# Patient Record
Sex: FEMALE | Race: BLACK OR AFRICAN AMERICAN | NOT HISPANIC OR LATINO | Employment: FULL TIME | URBAN - METROPOLITAN AREA
[De-identification: names, ages, dates, MRNs, and addresses within clinical notes are randomized per-mention and may not be internally consistent; named-entity substitution may affect disease eponyms.]

---

## 2023-09-07 ENCOUNTER — HOSPITAL ENCOUNTER (OUTPATIENT)
Dept: DATA CONVERSION | Facility: HOSPITAL | Age: 33
Discharge: HOME | End: 2023-09-07
Payer: COMMERCIAL

## 2023-09-07 DIAGNOSIS — E11.9 TYPE 2 DIABETES MELLITUS WITHOUT COMPLICATIONS (MULTI): ICD-10-CM

## 2023-09-07 DIAGNOSIS — E78.5 HYPERLIPIDEMIA, UNSPECIFIED: ICD-10-CM

## 2023-09-07 DIAGNOSIS — Z00.00 ENCOUNTER FOR GENERAL ADULT MEDICAL EXAMINATION WITHOUT ABNORMAL FINDINGS: ICD-10-CM

## 2023-09-07 LAB
ALBUMIN SERPL-MCNC: 4.5 GM/DL (ref 3.5–5)
ALBUMIN/GLOB SERPL: 1.6 RATIO (ref 1.5–3)
ALP BLD-CCNC: 44 U/L (ref 35–125)
ALT SERPL-CCNC: 11 U/L (ref 5–40)
ANION GAP SERPL CALCULATED.3IONS-SCNC: 12 MMOL/L (ref 0–19)
APPEARANCE PLAS: CLEAR
AST SERPL-CCNC: 11 U/L (ref 5–40)
BASOPHILS # BLD AUTO: 0.01 K/UL (ref 0–0.22)
BASOPHILS NFR BLD AUTO: 0.2 % (ref 0–1)
BILIRUB SERPL-MCNC: 0.2 MG/DL (ref 0.1–1.2)
BUN SERPL-MCNC: 15 MG/DL (ref 8–25)
BUN/CREAT SERPL: 25 RATIO (ref 8–21)
CALCIUM SERPL-MCNC: 9.5 MG/DL (ref 8.5–10.4)
CHLORIDE SERPL-SCNC: 101 MMOL/L (ref 97–107)
CHOLEST SERPL-MCNC: 204 MG/DL (ref 133–200)
CHOLEST/HDLC SERPL: 4 RATIO
CO2 SERPL-SCNC: 25 MMOL/L (ref 24–31)
COLOR SPUN FLD: YELLOW
CREAT SERPL-MCNC: 0.6 MG/DL (ref 0.4–1.6)
CREAT UR-MCNC: 85.3 MG/DL
DEPRECATED RDW RBC AUTO: 42 FL (ref 37–54)
DIFFERENTIAL METHOD BLD: ABNORMAL
EOSINOPHIL # BLD AUTO: 0.03 K/UL (ref 0–0.45)
EOSINOPHIL NFR BLD: 0.6 % (ref 0–3)
ERYTHROCYTE [DISTWIDTH] IN BLOOD BY AUTOMATED COUNT: 14.3 % (ref 11.7–15)
FASTING STATUS PATIENT QL REPORTED: ABNORMAL
GFR SERPL CREATININE-BSD FRML MDRD: 123 ML/MIN/1.73 M2
GLOBULIN SER-MCNC: 2.9 G/DL (ref 1.9–3.7)
GLUCOSE SERPL-MCNC: 193 MG/DL (ref 65–99)
HBA1C MFR BLD: 6.1 % (ref 4–6)
HCT VFR BLD AUTO: 40.3 % (ref 36–44)
HDLC SERPL-MCNC: 51 MG/DL
HGB BLD-MCNC: 13.4 GM/DL (ref 12–15)
IMM GRANULOCYTES # BLD AUTO: 0.01 K/UL (ref 0–0.1)
LDLC SERPL CALC-MCNC: 138 MG/DL (ref 65–130)
LYMPHOCYTES # BLD AUTO: 1.9 K/UL (ref 1.2–3.2)
LYMPHOCYTES NFR BLD MANUAL: 39.3 % (ref 20–40)
MCH RBC QN AUTO: 26.6 PG (ref 26–34)
MCHC RBC AUTO-ENTMCNC: 33.3 % (ref 31–37)
MCV RBC AUTO: 80.1 FL (ref 80–100)
MICROALBUMIN UR-MCNC: 12 MG/L (ref 0–23)
MICROALBUMIN/CREAT UR: 14.1 MG/G (ref 0–30)
MONOCYTES # BLD AUTO: 0.39 K/UL (ref 0–0.8)
MONOCYTES NFR BLD MANUAL: 8.1 % (ref 0–8)
NEUTROPHILS # BLD AUTO: 2.5 K/UL
NEUTROPHILS # BLD AUTO: 2.5 K/UL (ref 1.8–7.7)
NEUTROPHILS.IMMATURE NFR BLD: 0.2 % (ref 0–1)
NEUTS SEG NFR BLD: 51.6 % (ref 50–70)
NRBC BLD-RTO: 0 /100 WBC
PLATELET # BLD AUTO: 255 K/UL (ref 150–450)
PMV BLD AUTO: 11.4 CU (ref 7–12.6)
POTASSIUM SERPL-SCNC: 4.4 MMOL/L (ref 3.4–5.1)
PROT SERPL-MCNC: 7.4 G/DL (ref 5.9–7.9)
RBC # BLD AUTO: 5.03 M/UL (ref 4–4.9)
SODIUM SERPL-SCNC: 138 MMOL/L (ref 133–145)
TRIGL SERPL-MCNC: 76 MG/DL (ref 40–150)
WBC # BLD AUTO: 4.8 K/UL (ref 4.5–11)

## 2023-09-20 PROBLEM — S39.012A BACK STRAIN: Status: ACTIVE | Noted: 2023-09-20

## 2023-09-20 PROBLEM — M22.2X1 PATELLOFEMORAL PAIN SYNDROME OF BOTH KNEES: Status: ACTIVE | Noted: 2023-09-20

## 2023-09-20 PROBLEM — S69.90XA INJURY OF WRIST: Status: ACTIVE | Noted: 2023-09-20

## 2023-09-20 PROBLEM — R07.9 CHEST PAIN: Status: ACTIVE | Noted: 2023-09-20

## 2023-09-20 PROBLEM — S80.00XA KNEE CONTUSION: Status: ACTIVE | Noted: 2023-09-20

## 2023-09-20 PROBLEM — J45.909 ASTHMA DURING PREGNANCY (HHS-HCC): Status: ACTIVE | Noted: 2023-09-20

## 2023-09-20 PROBLEM — R10.9 ABDOMINAL PAIN IN PREGNANCY (HHS-HCC): Status: ACTIVE | Noted: 2023-09-20

## 2023-09-20 PROBLEM — E55.9 VITAMIN D DEFICIENCY: Status: ACTIVE | Noted: 2023-09-20

## 2023-09-20 PROBLEM — Q74.1 BIPARTITE PATELLA: Status: ACTIVE | Noted: 2023-09-20

## 2023-09-20 PROBLEM — E78.00 ELEVATED CHOLESTEROL: Status: ACTIVE | Noted: 2023-09-20

## 2023-09-20 PROBLEM — R60.0 EDEMA OF EXTREMITIES: Status: ACTIVE | Noted: 2023-09-20

## 2023-09-20 PROBLEM — R23.9 SKIN CHANGE: Status: ACTIVE | Noted: 2023-09-20

## 2023-09-20 PROBLEM — Z83.3 FAMILY HISTORY OF DIABETES MELLITUS IN MOTHER: Status: ACTIVE | Noted: 2023-09-20

## 2023-09-20 PROBLEM — M79.2 NEUROPATHIC PAIN: Status: ACTIVE | Noted: 2023-09-20

## 2023-09-20 PROBLEM — O26.899 ABDOMINAL PAIN IN PREGNANCY (HHS-HCC): Status: ACTIVE | Noted: 2023-09-20

## 2023-09-20 PROBLEM — E11.9 DIABETES MELLITUS (MULTI): Status: ACTIVE | Noted: 2023-09-20

## 2023-09-20 PROBLEM — R21 RASH: Status: ACTIVE | Noted: 2023-09-20

## 2023-09-20 PROBLEM — G57.62 MORTON'S NEUROMA OF LEFT FOOT: Status: ACTIVE | Noted: 2023-09-20

## 2023-09-20 PROBLEM — L50.9 URTICARIA: Status: ACTIVE | Noted: 2023-09-20

## 2023-09-20 PROBLEM — E78.5 DYSLIPIDEMIA: Status: ACTIVE | Noted: 2023-09-20

## 2023-09-20 PROBLEM — R06.00 DYSPNEA: Status: ACTIVE | Noted: 2023-09-20

## 2023-09-20 PROBLEM — M22.2X2 PATELLOFEMORAL PAIN SYNDROME OF BOTH KNEES: Status: ACTIVE | Noted: 2023-09-20

## 2023-09-20 PROBLEM — E11.40 TYPE 2 DIABETES MELLITUS WITH DIABETIC NEUROPATHY, UNSPECIFIED (MULTI): Status: ACTIVE | Noted: 2023-09-20

## 2023-09-20 PROBLEM — O99.519 ASTHMA DURING PREGNANCY (HHS-HCC): Status: ACTIVE | Noted: 2023-09-20
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
## 2023-09-20 PROBLEM — O24.112 PRE-EXISTING TYPE 2 DIABETES MELLITUS DURING PREGNANCY IN SECOND TRIMESTER (HHS-HCC): Status: ACTIVE | Noted: 2023-09-20

## 2023-09-20 PROBLEM — M54.9 BACKACHE: Status: ACTIVE | Noted: 2023-09-20

## 2023-09-20 PROBLEM — E78.2 MIXED HYPERLIPIDEMIA: Status: ACTIVE | Noted: 2023-09-20

## 2023-09-20 PROBLEM — R10.13 EPIGASTRIC PAIN: Status: ACTIVE | Noted: 2023-09-20

## 2023-09-20 PROBLEM — G57.50 TARSAL TUNNEL SYNDROME: Status: ACTIVE | Noted: 2023-09-20

## 2023-09-20 PROBLEM — G51.0 BELL'S PALSY: Status: ACTIVE | Noted: 2023-09-20

## 2023-09-20 PROBLEM — O35.BXX0 ABNORMAL FETAL ECHOCARDIOGRAM AFFECTING ANTEPARTUM CARE OF MOTHER (HHS-HCC): Status: ACTIVE | Noted: 2023-09-20

## 2023-09-20 PROBLEM — J45.909 ASTHMA (HHS-HCC): Status: ACTIVE | Noted: 2023-09-20

## 2023-09-20 PROBLEM — O24.113 TYPE 2 DIABETES MELLITUS AFFECTING PREGNANCY IN THIRD TRIMESTER, ANTEPARTUM (HHS-HCC): Status: ACTIVE | Noted: 2023-09-20

## 2023-09-20 PROBLEM — E11.9 TYPE 2 DIABETES MELLITUS WITHOUT COMPLICATIONS (MULTI): Status: ACTIVE | Noted: 2023-09-20

## 2023-09-20 PROBLEM — E11.65 TYPE 2 DIABETES MELLITUS WITH HYPERGLYCEMIA, WITHOUT LONG-TERM CURRENT USE OF INSULIN (MULTI): Status: ACTIVE | Noted: 2023-09-20

## 2023-09-20 PROBLEM — O99.281: Status: ACTIVE | Noted: 2023-09-20

## 2023-09-20 PROBLEM — H40.9 GLAUCOMA: Status: ACTIVE | Noted: 2023-09-20

## 2023-09-20 PROBLEM — R31.9 HEMATURIA SYNDROME: Status: ACTIVE | Noted: 2023-09-20

## 2023-09-20 PROBLEM — F41.0 ANXIETY ATTACK: Status: ACTIVE | Noted: 2023-09-20

## 2023-09-20 PROBLEM — S96.919A STRAIN OF TENDON OF FOOT AND ANKLE: Status: ACTIVE | Noted: 2023-09-20

## 2023-09-20 RX ORDER — DOCUSATE SODIUM 100 MG/1
100 CAPSULE, LIQUID FILLED ORAL 2 TIMES DAILY
COMMUNITY
End: 2023-12-22 | Stop reason: ALTCHOICE

## 2023-09-20 RX ORDER — FLASH GLUCOSE SENSOR
KIT MISCELLANEOUS
COMMUNITY
Start: 2023-07-08 | End: 2023-12-22 | Stop reason: SDUPTHER

## 2023-09-20 RX ORDER — TRIAMCINOLONE ACETONIDE 1 MG/G
CREAM TOPICAL
COMMUNITY
Start: 2023-02-03 | End: 2024-01-23 | Stop reason: ALTCHOICE

## 2023-09-20 RX ORDER — TIMOLOL MALEATE 5 MG/1
TABLET ORAL
COMMUNITY
End: 2024-01-23 | Stop reason: ALTCHOICE

## 2023-09-20 RX ORDER — TIRZEPATIDE 2.5 MG/.5ML
INJECTION, SOLUTION SUBCUTANEOUS
COMMUNITY
Start: 2023-02-01 | End: 2023-12-22 | Stop reason: ALTCHOICE

## 2023-09-20 RX ORDER — SEMAGLUTIDE 1.34 MG/ML
INJECTION, SOLUTION SUBCUTANEOUS
COMMUNITY
Start: 2022-12-30 | End: 2023-12-22 | Stop reason: ALTCHOICE

## 2023-09-20 RX ORDER — ACETAMINOPHEN 325 MG/1
TABLET ORAL
COMMUNITY
Start: 2023-01-17 | End: 2024-03-28 | Stop reason: ALTCHOICE

## 2023-09-20 RX ORDER — BLOOD-GLUCOSE METER
EACH MISCELLANEOUS
COMMUNITY
Start: 2018-05-27

## 2023-09-20 RX ORDER — ASPIRIN 81 MG/1
1 TABLET ORAL DAILY
COMMUNITY
Start: 2020-07-20 | End: 2024-01-23 | Stop reason: ALTCHOICE

## 2023-09-20 RX ORDER — INSULIN DETEMIR 100 [IU]/ML
INJECTION, SOLUTION SUBCUTANEOUS
COMMUNITY
Start: 2020-07-20 | End: 2023-12-22 | Stop reason: ALTCHOICE

## 2023-09-20 RX ORDER — IBUPROFEN 800 MG/1
800 TABLET ORAL EVERY 8 HOURS PRN
COMMUNITY
End: 2024-03-28 | Stop reason: ALTCHOICE

## 2023-09-20 RX ORDER — LATANOPROST 50 UG/ML
SOLUTION/ DROPS OPHTHALMIC
COMMUNITY
End: 2023-12-22 | Stop reason: ALTCHOICE

## 2023-09-20 RX ORDER — BRIMONIDINE TARTRATE 2 MG/ML
SOLUTION/ DROPS OPHTHALMIC
COMMUNITY
Start: 2023-08-18 | End: 2023-12-22 | Stop reason: ALTCHOICE

## 2023-09-20 RX ORDER — ATORVASTATIN CALCIUM 10 MG/1
1 TABLET, FILM COATED ORAL DAILY
COMMUNITY
End: 2024-01-23 | Stop reason: ALTCHOICE

## 2023-09-20 RX ORDER — INSULIN LISPRO 100 [IU]/ML
INJECTION, SOLUTION INTRAVENOUS; SUBCUTANEOUS
COMMUNITY
Start: 2020-07-20 | End: 2023-12-22 | Stop reason: ALTCHOICE

## 2023-09-20 RX ORDER — BUDESONIDE AND FORMOTEROL FUMARATE DIHYDRATE 160; 4.5 UG/1; UG/1
AEROSOL RESPIRATORY (INHALATION)
COMMUNITY
Start: 2018-01-23 | End: 2023-12-22 | Stop reason: ALTCHOICE

## 2023-09-20 RX ORDER — ERGOCALCIFEROL 1.25 MG/1
1.25 CAPSULE ORAL
COMMUNITY
Start: 2022-04-20

## 2023-09-20 RX ORDER — TIMOLOL MALEATE 5 MG/ML
1 SOLUTION/ DROPS OPHTHALMIC 2 TIMES DAILY
COMMUNITY
Start: 2023-08-21 | End: 2023-12-22 | Stop reason: ALTCHOICE

## 2023-10-11 ENCOUNTER — PHARMACY VISIT (OUTPATIENT)
Dept: PHARMACY | Facility: CLINIC | Age: 33
End: 2023-10-11
Payer: MEDICAID

## 2023-10-11 DIAGNOSIS — E11.9 TYPE 2 DIABETES MELLITUS WITHOUT COMPLICATION, WITHOUT LONG-TERM CURRENT USE OF INSULIN (MULTI): Primary | ICD-10-CM

## 2023-10-11 PROCEDURE — RXMED WILLOW AMBULATORY MEDICATION CHARGE

## 2023-10-11 RX ORDER — TIRZEPATIDE 2.5 MG/.5ML
INJECTION, SOLUTION SUBCUTANEOUS
Qty: 2 ML | Refills: 5 | Status: SHIPPED | OUTPATIENT
Start: 2023-10-11 | End: 2023-12-22 | Stop reason: WASHOUT

## 2023-11-10 ENCOUNTER — HOSPITAL ENCOUNTER (OUTPATIENT)
Dept: RADIOLOGY | Facility: CLINIC | Age: 33
Discharge: HOME | End: 2023-11-10
Payer: COMMERCIAL

## 2023-11-10 ENCOUNTER — OFFICE VISIT (OUTPATIENT)
Dept: ORTHOPEDIC SURGERY | Facility: CLINIC | Age: 33
End: 2023-11-10
Payer: COMMERCIAL

## 2023-11-10 DIAGNOSIS — S63.612A SPRAIN OF RIGHT MIDDLE FINGER, INITIAL ENCOUNTER: ICD-10-CM

## 2023-11-10 DIAGNOSIS — G57.62 MORTON'S NEUROMA OF LEFT FOOT: ICD-10-CM

## 2023-11-10 DIAGNOSIS — M79.644 PAIN OF RIGHT MIDDLE FINGER: ICD-10-CM

## 2023-11-10 DIAGNOSIS — M79.644 PAIN OF RIGHT MIDDLE FINGER: Primary | ICD-10-CM

## 2023-11-10 DIAGNOSIS — M79.672 LEFT FOOT PAIN: ICD-10-CM

## 2023-11-10 PROCEDURE — 73140 X-RAY EXAM OF FINGER(S): CPT | Mod: RT,FY

## 2023-11-10 PROCEDURE — 99213 OFFICE O/P EST LOW 20 MIN: CPT | Performed by: ORTHOPAEDIC SURGERY

## 2023-11-10 PROCEDURE — 1036F TOBACCO NON-USER: CPT | Performed by: ORTHOPAEDIC SURGERY

## 2023-11-10 PROCEDURE — 3078F DIAST BP <80 MM HG: CPT | Performed by: ORTHOPAEDIC SURGERY

## 2023-11-10 PROCEDURE — 3044F HG A1C LEVEL LT 7.0%: CPT | Performed by: ORTHOPAEDIC SURGERY

## 2023-11-10 PROCEDURE — 3074F SYST BP LT 130 MM HG: CPT | Performed by: ORTHOPAEDIC SURGERY

## 2023-11-10 ASSESSMENT — PAIN - FUNCTIONAL ASSESSMENT: PAIN_FUNCTIONAL_ASSESSMENT: 0-10

## 2023-11-10 ASSESSMENT — PATIENT HEALTH QUESTIONNAIRE - PHQ9
SUM OF ALL RESPONSES TO PHQ9 QUESTIONS 1 AND 2: 0
2. FEELING DOWN, DEPRESSED OR HOPELESS: NOT AT ALL
1. LITTLE INTEREST OR PLEASURE IN DOING THINGS: NOT AT ALL

## 2023-11-10 ASSESSMENT — PAIN SCALES - GENERAL
PAINLEVEL: 9
PAINLEVEL_OUTOF10: 7

## 2023-11-10 NOTE — PROGRESS NOTES
Subjective      No chief complaint on file.       No surgery found     [unfilled]   This 33-year-old patient presents today with right  third finger finger pain.  The patient states that they feel persistent pain at the third finger.  The patient denies any previous injury. The patient states that the finger pain is debilitating and impairs their ability to do thier normal activities of daily living.  They have tried ibuprofen and Tylenol with no relief of their finger pain.  In addition, I previously evaluated this patient for Ruelas's neuroma of the left foot and treated her with instruction regarding modification of shoewear and also with a cortisone injection.  While the cortisone injection did give good relief of her left foot pain did cause some discoloration of her skin.  The left foot pain between the second and third meta soles has recurred.  She presents today and request discussion of further treatment options.    CARDIOLOGY:   Negative for chest pain, shortness of breath.   RESPIRATORY:   Negative for chest pain, shortness of breath.   MUSCULOSKELETAL:   See HPI for details.   NEUROLOGY:   Negative for tingling, numbness, weakness.    Objective    There were no vitals taken for this visit.     Physical Exam  itutional: Appears stated age. No apparent distress  Labored Breathing: No  Psychiatric: Normal mood and effect.   Neurological: alert and oriented x3  Skin: intact  MUSCULOSKELETAL: Neck: No tenderness. No pain or limitation with range of motion. Back: No tenderness. Straight leg test negative bilaterally. right hand: There is no pain catching and locking at the third finger.  There is no pain catching or locking any of the other fingers of the right hand.  There is tenderness over the PIP joint. Phalen's is negative.  Finkelstein's is negative.  Neurovascular is intact to light touch.  Left foot: There is tenderness between the left second and third metatarsals.  There is some discoloration of  the skin of the left foot but this is improved from the previous office visit.    AP and lateral x-rays of the right hand done and read in office today show no evidence of acute fracture or bone destruction.     Diagnoses and all orders for this visit:  Pain of right middle finger (Primary)  -     XR fingers right 2+ views; Future  Sprain of right middle finger, initial encounter  Options are discussed with the patient in detail.  The patient is instructed regarding activity modification and risk for further injury with falling or trauma, ice, physician directed at home gentle strengthening and ROM exercises, and the appropriate use of Tylenol as needed for pain with its potential adverse reactions and side effects. The patient understands. Please note that this report has been produced using speech recognition software.  It may contain errors related to grammar, punctuation or spelling.  Electronically signed, but not reviewed.    Porter Sweet MD

## 2023-11-14 ENCOUNTER — PHARMACY VISIT (OUTPATIENT)
Dept: PHARMACY | Facility: CLINIC | Age: 33
End: 2023-11-14
Payer: MEDICAID

## 2023-11-14 PROCEDURE — RXMED WILLOW AMBULATORY MEDICATION CHARGE

## 2023-11-28 ENCOUNTER — TELEPHONE (OUTPATIENT)
Dept: OBSTETRICS AND GYNECOLOGY | Facility: CLINIC | Age: 33
End: 2023-11-28
Payer: COMMERCIAL

## 2023-11-28 NOTE — TELEPHONE ENCOUNTER
Patient's daughter has been sick and she forgot to start letrozole on day 3 of her cycle. Today is day 5 and she wants to know if she should start it or how to proceed

## 2023-12-14 ENCOUNTER — TELEPHONE (OUTPATIENT)
Dept: PRIMARY CARE | Facility: CLINIC | Age: 33
End: 2023-12-14
Payer: COMMERCIAL

## 2023-12-14 DIAGNOSIS — E11.9 TYPE 2 DIABETES MELLITUS WITHOUT COMPLICATION, WITHOUT LONG-TERM CURRENT USE OF INSULIN (MULTI): ICD-10-CM

## 2023-12-14 PROCEDURE — RXMED WILLOW AMBULATORY MEDICATION CHARGE

## 2023-12-14 RX ORDER — LANCETS 33 GAUGE
EACH MISCELLANEOUS
Qty: 200 EACH | Refills: 2 | Status: SHIPPED | OUTPATIENT
Start: 2023-12-14

## 2023-12-14 RX ORDER — DEXTROSE 4 G
TABLET,CHEWABLE ORAL
Qty: 1 EACH | Refills: 0 | Status: SHIPPED | OUTPATIENT
Start: 2023-12-14

## 2023-12-14 RX ORDER — BLOOD-GLUCOSE CONTROL, NORMAL
1 EACH MISCELLANEOUS 2 TIMES DAILY
Qty: 200 EACH | Refills: 2 | Status: SHIPPED | OUTPATIENT
Start: 2023-12-14 | End: 2024-07-14

## 2023-12-14 NOTE — TELEPHONE ENCOUNTER
Pt currently has Ricky, but would like a glucose monitoring kit with strips and lancets sent to her pharmacy. She doesn't feel the Ricky is reliable enough and would like to be able to check her levels. Send to  pharmacy.

## 2023-12-18 ENCOUNTER — PHARMACY VISIT (OUTPATIENT)
Dept: PHARMACY | Facility: CLINIC | Age: 33
End: 2023-12-18
Payer: MEDICAID

## 2023-12-18 PROCEDURE — RXMED WILLOW AMBULATORY MEDICATION CHARGE

## 2023-12-22 ENCOUNTER — OFFICE VISIT (OUTPATIENT)
Dept: PRIMARY CARE | Facility: CLINIC | Age: 33
End: 2023-12-22
Payer: COMMERCIAL

## 2023-12-22 VITALS
HEIGHT: 59 IN | SYSTOLIC BLOOD PRESSURE: 114 MMHG | TEMPERATURE: 98 F | WEIGHT: 128 LBS | HEART RATE: 105 BPM | DIASTOLIC BLOOD PRESSURE: 70 MMHG | OXYGEN SATURATION: 98 % | BODY MASS INDEX: 25.8 KG/M2

## 2023-12-22 DIAGNOSIS — E11.9 DIABETES MELLITUS WITHOUT COMPLICATION (MULTI): Primary | ICD-10-CM

## 2023-12-22 DIAGNOSIS — E11.40 TYPE 2 DIABETES MELLITUS WITH DIABETIC NEUROPATHY, WITHOUT LONG-TERM CURRENT USE OF INSULIN (MULTI): ICD-10-CM

## 2023-12-22 LAB — POC HEMOGLOBIN A1C: 6.8 % (ref 4.2–6.5)

## 2023-12-22 PROCEDURE — 1036F TOBACCO NON-USER: CPT | Performed by: INTERNAL MEDICINE

## 2023-12-22 PROCEDURE — 3074F SYST BP LT 130 MM HG: CPT | Performed by: INTERNAL MEDICINE

## 2023-12-22 PROCEDURE — 3044F HG A1C LEVEL LT 7.0%: CPT | Performed by: INTERNAL MEDICINE

## 2023-12-22 PROCEDURE — 99213 OFFICE O/P EST LOW 20 MIN: CPT | Performed by: INTERNAL MEDICINE

## 2023-12-22 PROCEDURE — 3078F DIAST BP <80 MM HG: CPT | Performed by: INTERNAL MEDICINE

## 2023-12-22 PROCEDURE — RXMED WILLOW AMBULATORY MEDICATION CHARGE

## 2023-12-22 RX ORDER — FLASH GLUCOSE SENSOR
KIT MISCELLANEOUS
Qty: 2 EACH | Refills: 5 | Status: SHIPPED | OUTPATIENT
Start: 2023-12-22 | End: 2024-03-28 | Stop reason: ALTCHOICE

## 2023-12-22 RX ORDER — INSULIN DETEMIR 100 [IU]/ML
15 INJECTION, SOLUTION SUBCUTANEOUS NIGHTLY
Qty: 15 ML | Refills: 5 | Status: SHIPPED | OUTPATIENT
Start: 2023-12-22 | End: 2024-03-04 | Stop reason: SDUPTHER

## 2023-12-22 ASSESSMENT — PATIENT HEALTH QUESTIONNAIRE - PHQ9
SUM OF ALL RESPONSES TO PHQ9 QUESTIONS 1 AND 2: 0
1. LITTLE INTEREST OR PLEASURE IN DOING THINGS: NOT AT ALL
2. FEELING DOWN, DEPRESSED OR HOPELESS: NOT AT ALL

## 2023-12-22 ASSESSMENT — ENCOUNTER SYMPTOMS
BLOOD IN STOOL: 0
BACK PAIN: 0
OCCASIONAL FEELINGS OF UNSTEADINESS: 0
SEIZURES: 0
WHEEZING: 0
POLYPHAGIA: 0
VOMITING: 0
NAUSEA: 0
CHILLS: 0
PALPITATIONS: 0
UNEXPECTED WEIGHT CHANGE: 0
ABDOMINAL PAIN: 0
TREMORS: 0
COUGH: 0
FREQUENCY: 0
TROUBLE SWALLOWING: 0
DYSURIA: 0
NUMBNESS: 0
DEPRESSION: 0
SHORTNESS OF BREATH: 0
POLYDIPSIA: 0
LOSS OF SENSATION IN FEET: 0
MYALGIAS: 0
SINUS PRESSURE: 0
FATIGUE: 0

## 2023-12-22 ASSESSMENT — PAIN SCALES - GENERAL: PAINLEVEL: 0-NO PAIN

## 2023-12-22 ASSESSMENT — COLUMBIA-SUICIDE SEVERITY RATING SCALE - C-SSRS
2. HAVE YOU ACTUALLY HAD ANY THOUGHTS OF KILLING YOURSELF?: NO
1. IN THE PAST MONTH, HAVE YOU WISHED YOU WERE DEAD OR WISHED YOU COULD GO TO SLEEP AND NOT WAKE UP?: NO
6. HAVE YOU EVER DONE ANYTHING, STARTED TO DO ANYTHING, OR PREPARED TO DO ANYTHING TO END YOUR LIFE?: NO

## 2023-12-22 NOTE — PROGRESS NOTES
"Subjective   Patient ID: Sarah Gallagher is a 33 y.o. female who presents for Follow-up (Pt states sugar readings have been high).    HPI     H/O diabetes mellitus- blood sugars usually range from .  Stated since last 2 weeks her blood sugars are in the 250s.  Denied any hypoglycemic episodes. Since Ozempic was on back order, started Mounjaro in 2022. Initially she felt mild nausea for 1st few days, however denied any symptoms now.   She is trying for pregnancy, had one of her fallopian tubes opened up few months ago.  Recommended to start Levemir.  As Mounjaro is contraindicated during pregnancy           Stated she was diagnosed with bilateral inguinal hernia-denied any swelling or pain.      Review of Systems   Constitutional:  Negative for chills, fatigue and unexpected weight change.   HENT:  Negative for postnasal drip, sinus pressure and trouble swallowing.    Respiratory:  Negative for cough, shortness of breath and wheezing.    Cardiovascular:  Negative for chest pain, palpitations and leg swelling.   Gastrointestinal:  Negative for abdominal pain, blood in stool, nausea and vomiting.   Endocrine: Negative for polydipsia, polyphagia and polyuria.   Genitourinary:  Negative for dysuria and frequency.   Musculoskeletal:  Negative for back pain and myalgias.   Skin:  Negative for rash.   Neurological:  Negative for tremors, seizures and numbness.   Psychiatric/Behavioral:  Negative for behavioral problems.        Objective   /70 (BP Location: Left arm, Patient Position: Sitting, BP Cuff Size: Small adult)   Pulse 105   Temp 36.7 °C (98 °F) (Temporal)   Ht 1.499 m (4' 11\")   Wt 58.1 kg (128 lb)   SpO2 98%   BMI 25.85 kg/m²     Physical Exam  Constitutional:       General: She is not in acute distress.     Appearance: Normal appearance.   HENT:      Head: Normocephalic and atraumatic.   Eyes:      Extraocular Movements: Extraocular movements intact.      Pupils: Pupils are equal, round, and " reactive to light.   Cardiovascular:      Rate and Rhythm: Normal rate and regular rhythm.      Heart sounds: Normal heart sounds. No murmur heard.     No friction rub.   Pulmonary:      Effort: Pulmonary effort is normal.      Breath sounds: Normal breath sounds. No wheezing or rales.   Abdominal:      General: Bowel sounds are normal.      Palpations: Abdomen is soft.      Tenderness: There is no abdominal tenderness. There is no guarding.   Musculoskeletal:      Right lower leg: No edema.      Left lower leg: No edema.   Skin:     General: Skin is warm and dry.      Findings: No rash.   Neurological:      General: No focal deficit present.      Mental Status: She is alert and oriented to person, place, and time.      Cranial Nerves: No cranial nerve deficit.   Psychiatric:         Mood and Affect: Mood normal.         Assessment/Plan       Sarah was seen today for follow-up.  Diagnoses and all orders for this visit:  Diabetes mellitus without complication (CMS/[facility]) (Primary)  -     FreeStyle Ricky 14 Day Sensor kit; As directed  -     POCT glycosylated hemoglobin (Hb A1C) manually resulted  -     insulin detemir (Levemir FlexPen) 100 unit/mL (3 mL) pen; Inject 15 Units under the skin once daily at bedtime.  Type 2 diabetes mellitus with diabetic neuropathy, without long-term current use of insulin (CMS/[facility])     Stopped Mounjaro, also advised to stop atorvastatin    Lab Results   Component Value Date    HGBA1C 6.8 (A) 12/22/2023        Current Outpatient Medications   Medication Instructions    acetaminophen (Tylenol) 325 mg tablet     albuterol 108 (90 Base) MCG/ACT inhaler 2 puffs, inhalation, Every 6 hours PRN    aspirin 81 mg EC tablet 1 tablet, oral, Daily    atorvastatin (Lipitor) 10 mg tablet 1 tablet, oral, Daily    blood sugar diagnostic (OneTouch Verio test strips) strip      blood sugar diagnostic strip USE EVERY 12 HOURS AS DIRECTED    blood-glucose meter (Accu-Chek Guide Glucose Meter) misc Use as  "directed to check blood sugar    brimonidine (AlphaGAN) 0.2 % ophthalmic solution USE 1 DROP IN BOTH EYES TWICE DAILY.    docusate sodium (Colace) 100 mg capsule TAKE 1 CAPSULE BY MOUTH TWO TIMES A DAY    ergocalciferol (Vitamin D-2) 1.25 MG (11085 UT) capsule 1 capsule, oral, Weekly    FreeStyle Ricky 14 Day Sensor kit As directed    ibuprofen 800 mg, oral, Every 8 hours PRN    lancets (OneTouch Delica Plus Lancet) 30 gauge misc 1 strip, miscellaneous, 2 times daily    lancets 33 gauge misc Use as directed    letrozole (Femara) 2.5 mg tablet TAKE 2 TABLETS BY MOUTH ONCE A DAY FROM DAY 3 OF THE PERIOD FOR 5 DAYS TO DAY 7 OF THE PERIOD. REPEAT FOR 3 MONTHS IF NOT PREGNANT    Levemir FlexPen 15 Units, subcutaneous, Nightly    pen needle, diabetic 32 gauge x 5/32\" needle Use every day with Levemir    timolol (Blocadren) 5 mg tablet      timolol (Timoptic) 0.5 % ophthalmic solution USE 1 DROP IN BOTH EYES TWICE DAILY.    triamcinolone (Kenalog) 0.1 % cream          "

## 2023-12-23 ENCOUNTER — PHARMACY VISIT (OUTPATIENT)
Dept: PHARMACY | Facility: CLINIC | Age: 33
End: 2023-12-23
Payer: MEDICAID

## 2024-01-15 ENCOUNTER — TELEMEDICINE (OUTPATIENT)
Dept: PRIMARY CARE | Facility: CLINIC | Age: 34
End: 2024-01-15
Payer: COMMERCIAL

## 2024-01-15 DIAGNOSIS — E11.9 DIABETES MELLITUS WITHOUT COMPLICATION (MULTI): ICD-10-CM

## 2024-01-15 PROCEDURE — 99442 PR PHYS/QHP TELEPHONE EVALUATION 11-20 MIN: CPT | Performed by: INTERNAL MEDICINE

## 2024-01-15 RX ORDER — TIRZEPATIDE 5 MG/.5ML
5 INJECTION, SOLUTION SUBCUTANEOUS
Qty: 2 ML | Refills: 2 | Status: SHIPPED | OUTPATIENT
Start: 2024-01-15 | End: 2024-02-05 | Stop reason: SDUPTHER

## 2024-01-15 ASSESSMENT — ENCOUNTER SYMPTOMS
OCCASIONAL FEELINGS OF UNSTEADINESS: 0
DEPRESSION: 0
LOSS OF SENSATION IN FEET: 0

## 2024-01-15 ASSESSMENT — PATIENT HEALTH QUESTIONNAIRE - PHQ9
1. LITTLE INTEREST OR PLEASURE IN DOING THINGS: NOT AT ALL
SUM OF ALL RESPONSES TO PHQ9 QUESTIONS 1 AND 2: 0
2. FEELING DOWN, DEPRESSED OR HOPELESS: NOT AT ALL

## 2024-01-15 NOTE — PROGRESS NOTES
Subjective   Patient ID: Sarah Gallagher is a 33 y.o. female who presents for Discuss elavated sugar levels.    HPI     H/O diabetes mellitus-complaining of increased blood sugar readings since last week.  Stated blood sugars are ranging from 200-3 50.   Started her on Levemir few weeks ago, as she is trying to get pregnant.    Due to increased blood sugars, she took Mounjaro today morning.    Stated she has been trying to get pregnant for past several years, wants to continue with Mounjaro at this time.             Stated she was diagnosed with bilateral inguinal hernia-denied any swelling or pain.    Review of Systems    See HPI    Objective   There were no vitals taken for this visit.    Physical Exam    Virtual visit    Assessment/Plan        Sarha was seen today for discuss elavated sugar levels.  Diagnoses and all orders for this visit:  Diabetes mellitus without complication (CMS/HCC)  -     tirzepatide (Mounjaro) 5 mg/0.5 mL pen injector; Inject 5 mg under the skin 1 (one) time per week.  -     Discontinue: insulin lispro (HumaLOG) 100 unit/mL injection; Inject 10 Units under the skin 3 times a day with meals. Take 2U for 150-199 blood sugars, 4U for 200-249, 6U for 250-299, 8U for 300-349, and 10 U for > 350 blood sugar level  -     insulin lispro (HumaLOG) 100 unit/mL injection; Inject 10 Units under the skin 3 times a day with meals. Take 2U for 150-199 blood sugars, 4U for 200-249, 6U for 250-299, 8U for 300-349, and 10 U for > 350 blood sugar level     Increased Mounjaro to 5 mg after 4 weeks of 2.5 mg  Stop Levemir, advised to start Levemir when she gets pregnant    Current Outpatient Medications   Medication Instructions    acetaminophen (Tylenol) 325 mg tablet     albuterol 108 (90 Base) MCG/ACT inhaler 2 puffs, inhalation, Every 6 hours PRN    aspirin 81 mg EC tablet 1 tablet, oral, Daily    atorvastatin (Lipitor) 10 mg tablet 1 tablet, oral, Daily    blood sugar diagnostic (OneTouch Verio test  "strips) strip      blood sugar diagnostic strip USE EVERY 12 HOURS AS DIRECTED    blood-glucose meter (Accu-Chek Guide Glucose Meter) misc Use as directed to check blood sugar    brimonidine (AlphaGAN) 0.2 % ophthalmic solution USE 1 DROP IN BOTH EYES TWICE DAILY.    docusate sodium (Colace) 100 mg capsule TAKE 1 CAPSULE BY MOUTH TWO TIMES A DAY    ergocalciferol (Vitamin D-2) 1.25 MG (99339 UT) capsule 1 capsule, oral, Weekly    FreeStyle Ricky 14 Day Sensor kit As directed    ibuprofen 800 mg, oral, Every 8 hours PRN    insulin lispro (HUMALOG) 10 Units, subcutaneous, 3 times daily with meals, Take 2U for 150-199 blood sugars, 4U for 200-249, 6U for 250-299, 8U for 300-349, and 10 U for > 350 blood sugar level    lancets (OneTouch Delica Plus Lancet) 30 gauge misc 1 strip, miscellaneous, 2 times daily    lancets 33 gauge misc Use as directed    letrozole (Femara) 2.5 mg tablet TAKE 2 TABLETS BY MOUTH ONCE A DAY FROM DAY 3 OF THE PERIOD FOR 5 DAYS TO DAY 7 OF THE PERIOD. REPEAT FOR 3 MONTHS IF NOT PREGNANT    Levemir FlexPen 15 Units, subcutaneous, Nightly    Mounjaro 5 mg, subcutaneous, Weekly    pen needle, diabetic 32 gauge x 5/32\" needle Use every day with Levemir    timolol (Blocadren) 5 mg tablet      timolol (Timoptic) 0.5 % ophthalmic solution USE 1 DROP IN BOTH EYES TWICE DAILY.    triamcinolone (Kenalog) 0.1 % cream       "

## 2024-01-16 ENCOUNTER — TELEPHONE (OUTPATIENT)
Dept: PRIMARY CARE | Facility: CLINIC | Age: 34
End: 2024-01-16
Payer: COMMERCIAL

## 2024-01-16 PROCEDURE — RXMED WILLOW AMBULATORY MEDICATION CHARGE

## 2024-01-16 RX ORDER — INSULIN LISPRO 100 [IU]/ML
10 INJECTION, SOLUTION INTRAVENOUS; SUBCUTANEOUS
Qty: 15 ML | Refills: 2 | Status: SHIPPED | OUTPATIENT
Start: 2024-01-16 | End: 2024-01-16 | Stop reason: SDUPTHER

## 2024-01-16 RX ORDER — INSULIN LISPRO 100 [IU]/ML
10 INJECTION, SOLUTION INTRAVENOUS; SUBCUTANEOUS
Qty: 15 ML | Refills: 2 | Status: SHIPPED | OUTPATIENT
Start: 2024-01-16 | End: 2024-03-28 | Stop reason: ALTCHOICE

## 2024-01-16 NOTE — TELEPHONE ENCOUNTER
Pt called states during virtual call yesterday, short acting insulin was supposed to be sent over to pharmacy due to her waking up with elevated sugar levels. Pt states her bloods sugar was high again this morning at 371, patient rechecked it 4hrs later and it 291. Please send it to [facility]

## 2024-01-22 ENCOUNTER — PHARMACY VISIT (OUTPATIENT)
Dept: PHARMACY | Facility: CLINIC | Age: 34
End: 2024-01-22
Payer: MEDICAID

## 2024-01-22 ENCOUNTER — TELEPHONE (OUTPATIENT)
Dept: PRIMARY CARE | Facility: CLINIC | Age: 34
End: 2024-01-22

## 2024-01-23 ENCOUNTER — OFFICE VISIT (OUTPATIENT)
Dept: PRIMARY CARE | Facility: CLINIC | Age: 34
End: 2024-01-23
Payer: COMMERCIAL

## 2024-01-23 VITALS
DIASTOLIC BLOOD PRESSURE: 72 MMHG | HEART RATE: 87 BPM | BODY MASS INDEX: 26.26 KG/M2 | OXYGEN SATURATION: 99 % | SYSTOLIC BLOOD PRESSURE: 104 MMHG | WEIGHT: 130 LBS | TEMPERATURE: 98.8 F

## 2024-01-23 DIAGNOSIS — E11.9 TYPE 2 DIABETES MELLITUS WITHOUT COMPLICATION, WITHOUT LONG-TERM CURRENT USE OF INSULIN (MULTI): Primary | ICD-10-CM

## 2024-01-23 DIAGNOSIS — E11.65 TYPE 2 DIABETES MELLITUS WITH HYPERGLYCEMIA, WITHOUT LONG-TERM CURRENT USE OF INSULIN (MULTI): ICD-10-CM

## 2024-01-23 PROCEDURE — 99213 OFFICE O/P EST LOW 20 MIN: CPT | Performed by: INTERNAL MEDICINE

## 2024-01-23 PROCEDURE — 3074F SYST BP LT 130 MM HG: CPT | Performed by: INTERNAL MEDICINE

## 2024-01-23 PROCEDURE — 1036F TOBACCO NON-USER: CPT | Performed by: INTERNAL MEDICINE

## 2024-01-23 PROCEDURE — 3078F DIAST BP <80 MM HG: CPT | Performed by: INTERNAL MEDICINE

## 2024-01-23 ASSESSMENT — PAIN SCALES - GENERAL: PAINLEVEL: 0-NO PAIN

## 2024-01-23 ASSESSMENT — ENCOUNTER SYMPTOMS
LOSS OF SENSATION IN FEET: 0
DEPRESSION: 0
OCCASIONAL FEELINGS OF UNSTEADINESS: 0

## 2024-01-23 ASSESSMENT — PATIENT HEALTH QUESTIONNAIRE - PHQ9
1. LITTLE INTEREST OR PLEASURE IN DOING THINGS: NOT AT ALL
2. FEELING DOWN, DEPRESSED OR HOPELESS: NOT AT ALL
SUM OF ALL RESPONSES TO PHQ9 QUESTIONS 1 AND 2: 0

## 2024-01-23 ASSESSMENT — LIFESTYLE VARIABLES: HOW MANY STANDARD DRINKS CONTAINING ALCOHOL DO YOU HAVE ON A TYPICAL DAY: PATIENT DOES NOT DRINK

## 2024-01-23 NOTE — PROGRESS NOTES
Subjective   Patient ID: Sarah Gallagher is a 33 y.o. female who presents for Diabetes (Dm follow up).    HPI   History of diabetes mellitus-she is here today as she is concerned about increased blood sugars in the last few days.  She started Mounjaro 2.5 mg, was unable to increase it to 5 mg as she is waiting for prior authorization from a pharmacy.  She prefers to take Ozempic, stated her blood sugars were better controlled on Ozempic.  Stopped Levemir, since last few days her blood sugars are running in 200's to 300's.  She does not want to take Humalog 3 times a day.  Denied any blurry vision, increased urinary frequency    Review of Systems   Constitutional:  Negative for chills, fatigue and unexpected weight change.   HENT:  Negative for postnasal drip, sinus pressure and trouble swallowing.    Respiratory:  Negative for cough, shortness of breath and wheezing.    Cardiovascular:  Negative for chest pain, palpitations and leg swelling.   Gastrointestinal:  Negative for abdominal pain, blood in stool, nausea and vomiting.   Endocrine: Negative for polydipsia, polyphagia and polyuria.   Genitourinary:  Negative for dysuria and frequency.   Musculoskeletal:  Negative for back pain and myalgias.   Skin:  Negative for rash.   Neurological:  Negative for tremors, seizures and numbness.   Psychiatric/Behavioral:  Negative for behavioral problems.        Objective   /72 (BP Location: Right arm, Patient Position: Sitting, BP Cuff Size: Adult)   Pulse 87   Temp 37.1 °C (98.8 °F) (Temporal)   Wt 59 kg (130 lb)   SpO2 99%   BMI 26.26 kg/m²     Physical Exam  Constitutional:       General: She is not in acute distress.  HENT:      Head: Normocephalic and atraumatic.   Cardiovascular:      Rate and Rhythm: Normal rate and regular rhythm.   Pulmonary:      Effort: Pulmonary effort is normal. No respiratory distress.      Breath sounds: Normal breath sounds.   Musculoskeletal:      Right lower leg: No edema.       Left lower leg: No edema.   Neurological:      Mental Status: She is alert.         Assessment/Plan       Sarah was seen today for diabetes.  Diagnoses and all orders for this visit:  Type 2 diabetes mellitus without complication, without long-term current use of insulin (CMS/[facility]) (Primary)  -     semaglutide 0.25 mg or 0.5 mg (2 mg/3 mL) pen injector; Inject 0.5 mg under the skin 1 (one) time per week.  Type 2 diabetes mellitus with hyperglycemia, without long-term current use of insulin (CMS/[facility])     Sent Ozempic to pharmacy, advised to stop Mounjaro if Ozempic is is covered  Start Levemir 12 units at night, take Humalog as needed  Advised to increase Levemir 2 units every 3 to 4 days to achieve fasting blood sugar range of

## 2024-01-25 ENCOUNTER — TELEPHONE (OUTPATIENT)
Dept: OBSTETRICS AND GYNECOLOGY | Facility: CLINIC | Age: 34
End: 2024-01-25
Payer: COMMERCIAL

## 2024-01-25 PROBLEM — O24.113 TYPE 2 DIABETES MELLITUS AFFECTING PREGNANCY IN THIRD TRIMESTER, ANTEPARTUM (HHS-HCC): Status: RESOLVED | Noted: 2023-09-20 | Resolved: 2024-01-25

## 2024-01-25 ASSESSMENT — ENCOUNTER SYMPTOMS
SINUS PRESSURE: 0
DYSURIA: 0
VOMITING: 0
PALPITATIONS: 0
NAUSEA: 0
SEIZURES: 0
TREMORS: 0
UNEXPECTED WEIGHT CHANGE: 0
FATIGUE: 0
BACK PAIN: 0
WHEEZING: 0
NUMBNESS: 0
TROUBLE SWALLOWING: 0
SHORTNESS OF BREATH: 0
COUGH: 0
POLYDIPSIA: 0
MYALGIAS: 0
BLOOD IN STOOL: 0
ABDOMINAL PAIN: 0
POLYPHAGIA: 0
CHILLS: 0
FREQUENCY: 0

## 2024-01-25 NOTE — TELEPHONE ENCOUNTER
Pt. Called stating she needs higher dose of Letrozole due to she thinks her period is about to start again.

## 2024-02-02 ENCOUNTER — OFFICE VISIT (OUTPATIENT)
Dept: OBSTETRICS AND GYNECOLOGY | Facility: CLINIC | Age: 34
End: 2024-02-02
Payer: COMMERCIAL

## 2024-02-02 VITALS — WEIGHT: 148.3 LBS | BODY MASS INDEX: 29.95 KG/M2

## 2024-02-02 DIAGNOSIS — N97.9 INFERTILITY, FEMALE: Primary | ICD-10-CM

## 2024-02-02 PROCEDURE — 99213 OFFICE O/P EST LOW 20 MIN: CPT | Performed by: OBSTETRICS & GYNECOLOGY

## 2024-02-02 PROCEDURE — 1036F TOBACCO NON-USER: CPT | Performed by: OBSTETRICS & GYNECOLOGY

## 2024-02-02 ASSESSMENT — ENCOUNTER SYMPTOMS
OCCASIONAL FEELINGS OF UNSTEADINESS: 0
LOSS OF SENSATION IN FEET: 0
DEPRESSION: 0

## 2024-02-02 ASSESSMENT — PAIN SCALES - GENERAL: PAINLEVEL: 0-NO PAIN

## 2024-02-02 ASSESSMENT — PATIENT HEALTH QUESTIONNAIRE - PHQ9
2. FEELING DOWN, DEPRESSED OR HOPELESS: NOT AT ALL
SUM OF ALL RESPONSES TO PHQ9 QUESTIONS 1 AND 2: 0
1. LITTLE INTEREST OR PLEASURE IN DOING THINGS: NOT AT ALL

## 2024-02-02 NOTE — PROGRESS NOTES
Sarah Gallagher is a 33 y.o. female, No obstetric history on file. who presented with  pelvic pain, sec infertility       History of endometriosis surgically diagnosed   Was on Letrozole for 3 cycles with no effect   Pain controlled   No  Dysparunia   Regular periods       Obstetrical History:  OB History    No obstetric history on file.           Vaginal discharge    No  Menopausal symptoms No        Last Pap:  History of abnormal pap exams? Yes      Past Medical History:   Diagnosis Date    Personal history of other diseases of the nervous system and sense organs 09/03/2020    History of glaucoma    Personal history of other endocrine, nutritional and metabolic disease     History of diabetes mellitus    Personal history of other mental and behavioral disorders     History of depression    Personal history of other specified conditions     History of shortness of breath    Personal history of other specified conditions     History of urinary frequency    Type 2 diabetes mellitus affecting pregnancy in third trimester, antepartum 09/20/2023     Past Surgical History:   Procedure Laterality Date    KNEE Left     knee scope     Family History   Problem Relation Name Age of Onset    Lung cancer Other grand mother      Social History     Tobacco Use    Smoking status: Never    Smokeless tobacco: Never   Vaping Use    Vaping Use: Never used   Substance Use Topics    Alcohol use: Never    Drug use: Not Currently     Social History     Tobacco Use   Smoking Status Never   Smokeless Tobacco Never       Current Outpatient Medications on File Prior to Visit   Medication Sig Dispense Refill    acetaminophen (Tylenol) 325 mg tablet       albuterol 108 (90 Base) MCG/ACT inhaler Inhale 2 puffs every 6 hours if needed.      blood sugar diagnostic (OneTouch Verio test strips) strip        blood sugar diagnostic strip USE EVERY 12 HOURS AS DIRECTED 200 each 2    blood-glucose meter (Accu-Chek Guide Glucose Meter) misc Use as  "directed to check blood sugar 1 each 0    brimonidine (AlphaGAN) 0.2 % ophthalmic solution USE 1 DROP IN BOTH EYES TWICE DAILY. 10 mL 5    docusate sodium (Colace) 100 mg capsule TAKE 1 CAPSULE BY MOUTH TWO TIMES A DAY 30 capsule 1    ergocalciferol (Vitamin D-2) 1.25 MG (07382 UT) capsule Take 1 capsule (1,250 mcg) by mouth 1 (one) time per week.      FreeStyle Ricky 14 Day Sensor kit As directed 2 each 5    ibuprofen 800 mg tablet Take 1 tablet (800 mg) by mouth every 8 hours if needed.      insulin detemir (Levemir FlexPen) 100 unit/mL (3 mL) pen Inject 15 Units under the skin once daily at bedtime. 15 mL 5    insulin lispro (HumaLOG) 100 unit/mL injection Inject 10 Units under the skin 3 times a day with meals. Take 2U for 150-199 blood sugars, 4U for 200-249, 6U for 250-299, 8U for 300-349, and 10 U for > 350 blood sugar level 15 mL 2    lancets (OneTouch Delica Plus Lancet) 30 gauge misc 1 strip 2 times a day. 200 each 2    lancets 33 gauge misc Use as directed 200 each 2    pen needle, diabetic 32 gauge x 5/32\" needle Use every day with Levemir 100 each 3    semaglutide 0.25 mg or 0.5 mg (2 mg/3 mL) pen injector Inject 0.5 mg under the skin 1 (one) time per week. 3 mL 1    timolol (Timoptic) 0.5 % ophthalmic solution USE 1 DROP IN BOTH EYES TWICE DAILY. 10 mL 5    tirzepatide (Mounjaro) 5 mg/0.5 mL pen injector Inject 5 mg under the skin 1 (one) time per week. (Patient taking differently: Inject 2.5 mg under the skin 1 (one) time per week.) 2 mL 2     No current facility-administered medications on file prior to visit.     Allergies   Allergen Reactions    Morphine Nausea And Vomiting, GI Upset, Nausea Only and Nausea/vomiting         REVIEW OF SYSTEMS:    General: No weight loss, malaise or fevers or other constitutional symptoms.  Neuro: No history of TIA's, stroke,.  No neurological symptoms or problems. No visual changes  Respiratory: No history of respiratory/pulmonary symptoms or " problems.  Cardiovascular: No history of cardiovascular symptoms or problems.  GI: No history of GI symptoms or problems.   : No history of UTI in past 6 weeks.  No history of  symptoms or problems.  BREASTS: No skin dimpling, nipple discharge or masses.  Endocrine: No history of diabetes. No Thyroid symptoms  Hematology: No history of bleeding or clotting disorder.  Skin: Negative for lesions, rash, and itching.      PHYSICAL EXAMINATION:    Wt 67.3 kg (148 lb 4.8 oz)   LMP  (LMP Unknown)   BMI 29.95 kg/m²   GENERAL: pleasant, female in no apparent distress  HEENT: Normocephalic, atraumatic, mucus membranes moist and no lesions  NEURO: alert and oriented x3,exam grossly non-focal  NECK: Supple, full range of motion, no adenopathy and thyroid normal  DERMATOLOGY: Normal, without lesions, non-icteric and non-hirsute  BREAST: soft, non-tender, symmetric, no dominant mass, normal nipple-areolar complex, no lymphadenopathy and no nipple discharge  CHEST: Normal inspiratory effort    ABDOMEN: soft, non-tender and no masses  PELVIC: uterus normal size, shape and consistency, no adnexal masses and non-tender        ASSESSMENT and Plan:    Sarah Gallagher is a 33 y.o. female, No obstetric history on file. who  presented with sec infertility and endometriosis       Plan is  - I  ordered HSG to check tubes  - Patner has other kids and wont do Semen analysis   - Will increase letrozole dose if HSG is normal      - FU after US             Gracia Vann MD

## 2024-02-05 DIAGNOSIS — E11.9 DIABETES MELLITUS WITHOUT COMPLICATION (MULTI): ICD-10-CM

## 2024-02-05 DIAGNOSIS — E11.9 TYPE 2 DIABETES MELLITUS WITHOUT COMPLICATION, WITHOUT LONG-TERM CURRENT USE OF INSULIN (MULTI): ICD-10-CM

## 2024-02-05 RX ORDER — TIRZEPATIDE 2.5 MG/.5ML
INJECTION, SOLUTION SUBCUTANEOUS
Qty: 2 ML | Refills: 5 | Status: CANCELLED | OUTPATIENT
Start: 2024-02-05 | End: 2025-02-04

## 2024-02-05 RX ORDER — TIRZEPATIDE 5 MG/.5ML
5 INJECTION, SOLUTION SUBCUTANEOUS
Qty: 2 ML | Refills: 2 | Status: SHIPPED | OUTPATIENT
Start: 2024-02-05 | End: 2024-02-06 | Stop reason: ALTCHOICE

## 2024-02-06 ENCOUNTER — TELEPHONE (OUTPATIENT)
Dept: PRIMARY CARE | Facility: CLINIC | Age: 34
End: 2024-02-06
Payer: COMMERCIAL

## 2024-02-06 DIAGNOSIS — E11.9 TYPE 2 DIABETES MELLITUS WITHOUT COMPLICATION, WITHOUT LONG-TERM CURRENT USE OF INSULIN (MULTI): Primary | ICD-10-CM

## 2024-02-06 RX ORDER — DULAGLUTIDE 0.75 MG/.5ML
0.75 INJECTION, SOLUTION SUBCUTANEOUS
Qty: 2 ML | Refills: 0 | Status: SHIPPED | OUTPATIENT
Start: 2024-02-06 | End: 2024-04-01

## 2024-02-06 NOTE — TELEPHONE ENCOUNTER
"Pt hasn't medication ( mounjaro ) in 2 days, was told by her insurance company that we did the prior auth wrong. I passed on the info to the personnel what was said in order to get it hopefully approved. Pt then called back saying that in order for her to get mounjaro we would need to do a peer to peer, if not pt is willing to take Trulicity in the highest strength even though she felt it didn't help her before but she needs to have some type of medication \"she is shaking\". Please advise  "

## 2024-02-11 ENCOUNTER — APPOINTMENT (OUTPATIENT)
Dept: RADIOLOGY | Facility: HOSPITAL | Age: 34
End: 2024-02-11
Payer: COMMERCIAL

## 2024-02-11 ENCOUNTER — HOSPITAL ENCOUNTER (EMERGENCY)
Facility: HOSPITAL | Age: 34
Discharge: HOME | End: 2024-02-11
Payer: COMMERCIAL

## 2024-02-11 VITALS
SYSTOLIC BLOOD PRESSURE: 109 MMHG | HEART RATE: 80 BPM | BODY MASS INDEX: 26.87 KG/M2 | DIASTOLIC BLOOD PRESSURE: 67 MMHG | OXYGEN SATURATION: 100 % | RESPIRATION RATE: 16 BRPM | TEMPERATURE: 97.9 F | WEIGHT: 128 LBS | HEIGHT: 58 IN

## 2024-02-11 DIAGNOSIS — S63.501A SPRAIN OF RIGHT WRIST, INITIAL ENCOUNTER: Primary | ICD-10-CM

## 2024-02-11 PROCEDURE — 73110 X-RAY EXAM OF WRIST: CPT | Mod: RT

## 2024-02-11 PROCEDURE — 99283 EMERGENCY DEPT VISIT LOW MDM: CPT

## 2024-02-11 ASSESSMENT — PAIN DESCRIPTION - FREQUENCY: FREQUENCY: CONSTANT/CONTINUOUS

## 2024-02-11 ASSESSMENT — PAIN DESCRIPTION - PAIN TYPE: TYPE: ACUTE PAIN

## 2024-02-11 ASSESSMENT — PAIN DESCRIPTION - ORIENTATION
ORIENTATION: RIGHT
ORIENTATION: RIGHT

## 2024-02-11 ASSESSMENT — PAIN - FUNCTIONAL ASSESSMENT
PAIN_FUNCTIONAL_ASSESSMENT: 0-10
PAIN_FUNCTIONAL_ASSESSMENT: 0-10

## 2024-02-11 ASSESSMENT — PAIN DESCRIPTION - LOCATION
LOCATION: WRIST
LOCATION: WRIST

## 2024-02-11 ASSESSMENT — LIFESTYLE VARIABLES
HAVE PEOPLE ANNOYED YOU BY CRITICIZING YOUR DRINKING: NO
EVER HAD A DRINK FIRST THING IN THE MORNING TO STEADY YOUR NERVES TO GET RID OF A HANGOVER: NO
EVER FELT BAD OR GUILTY ABOUT YOUR DRINKING: NO
HAVE YOU EVER FELT YOU SHOULD CUT DOWN ON YOUR DRINKING: NO

## 2024-02-11 ASSESSMENT — COLUMBIA-SUICIDE SEVERITY RATING SCALE - C-SSRS
6. HAVE YOU EVER DONE ANYTHING, STARTED TO DO ANYTHING, OR PREPARED TO DO ANYTHING TO END YOUR LIFE?: NO
1. IN THE PAST MONTH, HAVE YOU WISHED YOU WERE DEAD OR WISHED YOU COULD GO TO SLEEP AND NOT WAKE UP?: NO
2. HAVE YOU ACTUALLY HAD ANY THOUGHTS OF KILLING YOURSELF?: NO

## 2024-02-11 ASSESSMENT — PAIN DESCRIPTION - PROGRESSION: CLINICAL_PROGRESSION: NOT CHANGED

## 2024-02-11 ASSESSMENT — PAIN DESCRIPTION - ONSET: ONSET: ONGOING

## 2024-02-11 ASSESSMENT — PAIN SCALES - GENERAL
PAINLEVEL_OUTOF10: 9
PAINLEVEL_OUTOF10: 9

## 2024-02-11 ASSESSMENT — PAIN DESCRIPTION - DESCRIPTORS: DESCRIPTORS: ACHING

## 2024-02-11 NOTE — ED PROVIDER NOTES
HPI   Chief Complaint   Patient presents with    Wrist Injury     Patient states yesterday she was picking up a cast iron griddle and her right wrist bent backwards and hurting today.       HPI                    [place] Coma Scale Score: 15                     Patient History   Past Medical History:   Diagnosis Date    Personal history of other diseases of the nervous system and sense organs 09/03/2020    History of glaucoma    Personal history of other endocrine, nutritional and metabolic disease     History of diabetes mellitus    Personal history of other mental and behavioral disorders     History of depression    Personal history of other specified conditions     History of shortness of breath    Personal history of other specified conditions     History of urinary frequency    Type 2 diabetes mellitus affecting pregnancy in third trimester, antepartum 09/20/2023     Past Surgical History:   Procedure Laterality Date    KNEE Left     knee scope     Family History   Problem Relation Name Age of Onset    Lung cancer Other grand mother      Social History     Tobacco Use    Smoking status: Never    Smokeless tobacco: Never   Vaping Use    Vaping Use: Never used   Substance Use Topics    Alcohol use: Never    Drug use: Never       Physical Exam   ED Triage Vitals [02/11/24 1336]   Temperature Heart Rate Respirations BP   36.6 °C (97.9 °F) 82 16 133/84      Pulse Ox Temp Source Heart Rate Source Patient Position   100 % Oral Monitor Sitting      BP Location FiO2 (%)     Left arm --       Physical Exam  CONSTITUTIONAL: Vital signs reviewed as charted, well-developed and in no distress  Eyes: Extraocular muscles are intact. Pupils equal round and reactive to light. Conjunctiva are pink.    ENT: Mucous membranes are moist. Tongue in the midline. Pharynx was without erythema or exudates, uvula midline  LUNGS: Breath sounds equal and clear to auscultation. Good air exchange, no wheezes rales or retractions, pulse  oximetry is charted.  HEART: Regular rate and rhythm without murmur thrill or rub, strong tones, auscultation is normal.  ABDOMEN: Soft and nontender without guarding rebound rigidity or mass. Bowel sounds are present and normal in all quadrants. There is no palpable masses or aneurysms identified. No hepatosplenomegaly, normal abdominal exam.  Neuro: The patient is awake, alert and oriented ×3. Moving all 4 extremities and answering questions appropriately.   MUSCULOSKELETAL examination of the right upper extremity shows no tenderness at the proximal radius or ulna.  Does have some mild tenderness at the distal radius and ulna.  Worsening pain with extension.  No ecchymosis edema or obvious deformity motor sensation pulses are intact distally.  PSYCH: Awake alert oriented, normal mood and affect.  Skin:  Dry, normal color, warm to the touch, no rash present.      ED Course & MDM   Diagnoses as of 02/11/24 1423   Sprain of right wrist, initial encounter       Medical Decision Making  History obtained from: patient    Vital signs, nursing notes, current medications, past medical history, Surgical history, allergies, social history, family History were reviewed.         HPI:  Patient 33-year-old female presenting emergency room today complaining of a right wrist injury.  States yesterday she was lifting something up when she felt a pop.  She states today it is painful every time she tries to lift something or grab something.  She denies dizziness, chest pain, shortness of breath, abdominal pain extremity edema.  She is nontoxic and well-appearing vitals within normal limits.      10 point ROS was reviewed and negative except Noted above in HPI.  DDX: as listed above          MDM Summary/considerations:  EMERGENCY DEPARTMENT COURSE and DIFFERENTIAL DIAGNOSIS/MDM:        The patient presented with a chief complaint of right wrist injury. The differential diagnosis associated with this patient's presentation includes  "fracture, dislocation, subluxation, sprain.       Vitals:    Vitals:    02/11/24 1336   BP: 133/84   BP Location: Left arm   Patient Position: Sitting   Pulse: 82   Resp: 16   Temp: 36.6 °C (97.9 °F)   TempSrc: Oral   SpO2: 100%   Weight: 58.1 kg (128 lb)   Height: 1.473 m (4' 10\")       Diagnoses as of 02/11/24 1425   Sprain of right wrist, initial encounter       History Limited by:    None    Independent history obtained from:    None      External records reviewed:    None      Diagnostics interpreted by me:    Xray(s) of the right wrist      Discussions with other clinicians:    None      Chronic conditions impacting care:    None      Social determinants of health affecting care:    None    Diagnostic tests considered but not performed: none    ED Medications managed:    Medications - No data to display      Prescription drugs considered:    None    Labs Reviewed - No data to display  XR wrist right 3+ views   Final Result   Normal right wrist radiographs.        Signed by: Dillon Medina 2/11/2024 2:12 PM   Dictation workstation:   [id number]        Medications - No data to display  New Prescriptions    No medications on file       I estimate there is LOW risk for COMPARTMENT SYNDROME, DEEP VENOUS THROMBOSIS, SEPTIC ARTHRITIS, TENDON OR NEUROVASCULAR INJURY, thus I consider the discharge disposition reasonable. We have discussed the diagnosis and risks, and we agree with discharging home to follow-up with their primary doctor or the referral orthopedist. We also discussed returning to the Emergency Department immediately if new or worsening symptoms occur. We have discussed the symptoms which aremost concerning (e.g., changing or worsening pain, numbness, weakness) that necessitates immediate return.    X-rays were negative for acute fracture.  Clinically consistent with sprain.  Symptomatic treatment include acetaminophen and ibuprofen ice were discussed with the patient.  Was placed into a Velcro wrist " splint.  Will follow with her PCP 1 to 2 days for reevaluation.  Was discharged home in stable condition all of the patient's questions were answered to the best of my ability.  Patient states understanding that they have been screened for an emergency today and we have not found any etiology of symptoms that requires emergent treatment or admission to the hospital at this point. They understand that they have not had definitive care day and require follow-up for treatment of their condition. They also state understanding that they may have an emergent condition that may potentially have not of detected at this visit and they must return to the emergency department if they develop any worsening of symptoms or new complaints.          Critical Care: Not warranted at this time        This chart was completed using voice recognition transcription software. Please excuse any errors of transcription including grammatical, punctuation, syntax and spelling errors.  Please contact me with any questions regarding this chart.    Procedure  Procedures     YOUSIF Domingo-WILMA  02/11/24 3020

## 2024-02-12 DIAGNOSIS — E11.65 TYPE 2 DIABETES MELLITUS WITH HYPERGLYCEMIA, WITHOUT LONG-TERM CURRENT USE OF INSULIN (MULTI): Primary | ICD-10-CM

## 2024-02-12 PROCEDURE — RXMED WILLOW AMBULATORY MEDICATION CHARGE

## 2024-02-15 PROCEDURE — RXMED WILLOW AMBULATORY MEDICATION CHARGE

## 2024-02-16 PROCEDURE — RXMED WILLOW AMBULATORY MEDICATION CHARGE

## 2024-02-17 ENCOUNTER — PHARMACY VISIT (OUTPATIENT)
Dept: PHARMACY | Facility: CLINIC | Age: 34
End: 2024-02-17
Payer: MEDICAID

## 2024-03-04 ENCOUNTER — OFFICE VISIT (OUTPATIENT)
Dept: PRIMARY CARE | Facility: CLINIC | Age: 34
End: 2024-03-04
Payer: COMMERCIAL

## 2024-03-04 ENCOUNTER — HOSPITAL ENCOUNTER (EMERGENCY)
Facility: HOSPITAL | Age: 34
Discharge: HOME | End: 2024-03-04
Attending: STUDENT IN AN ORGANIZED HEALTH CARE EDUCATION/TRAINING PROGRAM
Payer: COMMERCIAL

## 2024-03-04 VITALS
HEART RATE: 74 BPM | TEMPERATURE: 97.9 F | RESPIRATION RATE: 16 BRPM | OXYGEN SATURATION: 98 % | DIASTOLIC BLOOD PRESSURE: 94 MMHG | HEIGHT: 58 IN | WEIGHT: 134 LBS | BODY MASS INDEX: 28.13 KG/M2 | SYSTOLIC BLOOD PRESSURE: 141 MMHG

## 2024-03-04 VITALS
TEMPERATURE: 98 F | DIASTOLIC BLOOD PRESSURE: 68 MMHG | OXYGEN SATURATION: 99 % | BODY MASS INDEX: 28.13 KG/M2 | WEIGHT: 134 LBS | HEART RATE: 100 BPM | HEIGHT: 58 IN | SYSTOLIC BLOOD PRESSURE: 122 MMHG

## 2024-03-04 DIAGNOSIS — R20.0 RIGHT FACIAL NUMBNESS: Primary | ICD-10-CM

## 2024-03-04 DIAGNOSIS — E11.9 DIABETES MELLITUS WITHOUT COMPLICATION (MULTI): ICD-10-CM

## 2024-03-04 DIAGNOSIS — R20.2 FACIAL PARESTHESIA: Primary | ICD-10-CM

## 2024-03-04 DIAGNOSIS — R20.0 NUMBNESS OF TONGUE: ICD-10-CM

## 2024-03-04 LAB
ALBUMIN SERPL-MCNC: 4.6 G/DL (ref 3.5–5)
ALP BLD-CCNC: 41 U/L (ref 35–125)
ALT SERPL-CCNC: 9 U/L (ref 5–40)
ANION GAP SERPL CALC-SCNC: 14 MMOL/L
AST SERPL-CCNC: 12 U/L (ref 5–40)
BILIRUB SERPL-MCNC: 0.2 MG/DL (ref 0.1–1.2)
BUN SERPL-MCNC: 14 MG/DL (ref 8–25)
CALCIUM SERPL-MCNC: 9.5 MG/DL (ref 8.5–10.4)
CHLORIDE SERPL-SCNC: 100 MMOL/L (ref 97–107)
CO2 SERPL-SCNC: 23 MMOL/L (ref 24–31)
CREAT SERPL-MCNC: 0.7 MG/DL (ref 0.4–1.6)
EGFRCR SERPLBLD CKD-EPI 2021: >90 ML/MIN/1.73M*2
ERYTHROCYTE [DISTWIDTH] IN BLOOD BY AUTOMATED COUNT: 14.7 % (ref 11.5–14.5)
GLUCOSE SERPL-MCNC: 123 MG/DL (ref 65–99)
HCG SERPL-ACNC: <1 MIU/ML
HCT VFR BLD AUTO: 38.8 % (ref 36–46)
HGB BLD-MCNC: 12.8 G/DL (ref 12–16)
MAGNESIUM SERPL-MCNC: 1.8 MG/DL (ref 1.6–3.1)
MCH RBC QN AUTO: 26.3 PG (ref 26–34)
MCHC RBC AUTO-ENTMCNC: 33 G/DL (ref 32–36)
MCV RBC AUTO: 80 FL (ref 80–100)
NRBC BLD-RTO: 0 /100 WBCS (ref 0–0)
PLATELET # BLD AUTO: 304 X10*3/UL (ref 150–450)
POTASSIUM SERPL-SCNC: 3.8 MMOL/L (ref 3.4–5.1)
PROT SERPL-MCNC: 7.5 G/DL (ref 5.9–7.9)
RBC # BLD AUTO: 4.86 X10*6/UL (ref 4–5.2)
SODIUM SERPL-SCNC: 137 MMOL/L (ref 133–145)
TSH SERPL DL<=0.05 MIU/L-ACNC: 1.5 MIU/L (ref 0.27–4.2)
WBC # BLD AUTO: 6.9 X10*3/UL (ref 4.4–11.3)

## 2024-03-04 PROCEDURE — 84702 CHORIONIC GONADOTROPIN TEST: CPT | Performed by: STUDENT IN AN ORGANIZED HEALTH CARE EDUCATION/TRAINING PROGRAM

## 2024-03-04 PROCEDURE — 80053 COMPREHEN METABOLIC PANEL: CPT | Performed by: STUDENT IN AN ORGANIZED HEALTH CARE EDUCATION/TRAINING PROGRAM

## 2024-03-04 PROCEDURE — 85027 COMPLETE CBC AUTOMATED: CPT | Performed by: STUDENT IN AN ORGANIZED HEALTH CARE EDUCATION/TRAINING PROGRAM

## 2024-03-04 PROCEDURE — 36415 COLL VENOUS BLD VENIPUNCTURE: CPT | Performed by: STUDENT IN AN ORGANIZED HEALTH CARE EDUCATION/TRAINING PROGRAM

## 2024-03-04 PROCEDURE — 83735 ASSAY OF MAGNESIUM: CPT | Performed by: STUDENT IN AN ORGANIZED HEALTH CARE EDUCATION/TRAINING PROGRAM

## 2024-03-04 PROCEDURE — 99214 OFFICE O/P EST MOD 30 MIN: CPT | Performed by: INTERNAL MEDICINE

## 2024-03-04 PROCEDURE — 99283 EMERGENCY DEPT VISIT LOW MDM: CPT

## 2024-03-04 PROCEDURE — 1036F TOBACCO NON-USER: CPT | Performed by: INTERNAL MEDICINE

## 2024-03-04 PROCEDURE — 84443 ASSAY THYROID STIM HORMONE: CPT | Performed by: STUDENT IN AN ORGANIZED HEALTH CARE EDUCATION/TRAINING PROGRAM

## 2024-03-04 PROCEDURE — 3078F DIAST BP <80 MM HG: CPT | Performed by: INTERNAL MEDICINE

## 2024-03-04 PROCEDURE — 3074F SYST BP LT 130 MM HG: CPT | Performed by: INTERNAL MEDICINE

## 2024-03-04 ASSESSMENT — ENCOUNTER SYMPTOMS
LOSS OF SENSATION IN FEET: 0
DEPRESSION: 0
OCCASIONAL FEELINGS OF UNSTEADINESS: 0

## 2024-03-04 ASSESSMENT — COLUMBIA-SUICIDE SEVERITY RATING SCALE - C-SSRS
2. HAVE YOU ACTUALLY HAD ANY THOUGHTS OF KILLING YOURSELF?: NO
2. HAVE YOU ACTUALLY HAD ANY THOUGHTS OF KILLING YOURSELF?: NO
1. IN THE PAST MONTH, HAVE YOU WISHED YOU WERE DEAD OR WISHED YOU COULD GO TO SLEEP AND NOT WAKE UP?: NO
1. IN THE PAST MONTH, HAVE YOU WISHED YOU WERE DEAD OR WISHED YOU COULD GO TO SLEEP AND NOT WAKE UP?: NO
6. HAVE YOU EVER DONE ANYTHING, STARTED TO DO ANYTHING, OR PREPARED TO DO ANYTHING TO END YOUR LIFE?: NO
6. HAVE YOU EVER DONE ANYTHING, STARTED TO DO ANYTHING, OR PREPARED TO DO ANYTHING TO END YOUR LIFE?: NO

## 2024-03-04 ASSESSMENT — PAIN DESCRIPTION - ORIENTATION: ORIENTATION: RIGHT

## 2024-03-04 ASSESSMENT — PAIN SCALES - GENERAL
PAINLEVEL_OUTOF10: 8
PAINLEVEL: 8

## 2024-03-04 ASSESSMENT — PAIN DESCRIPTION - LOCATION: LOCATION: FACE

## 2024-03-04 ASSESSMENT — PAIN DESCRIPTION - PAIN TYPE: TYPE: ACUTE PAIN

## 2024-03-04 ASSESSMENT — PAIN - FUNCTIONAL ASSESSMENT: PAIN_FUNCTIONAL_ASSESSMENT: 0-10

## 2024-03-04 NOTE — ED TRIAGE NOTES
Sent from doctor's office for right facial pain (pressure/tightness)- right jaw, chin, ear pain. Also having tongue tingling and shaking. This has been going on for about 4 days. Hx of glaucoma and T2DM on insulin

## 2024-03-04 NOTE — PROGRESS NOTES
I saw this patient very briefly as the Physician in Triage for rapid assessment, to establish acuity and develop basic plan of care. A more thorough history and physical exam will be documented by treating physician and/or JB in the emergency department.     History: 33-year-old female presenting for 4 days of right-sided facial tingling and tingling over the right tip of the tongue.  Denies any aphasia, dysarthria.  Denies any focal weakness, numbness or tingling.  She has no history of TMJ previously diagnosed by ENT but has not followed up with them.  She does endorse mild jaw pain.  She denies any headache, neck pain, neck stiffness or fevers chills or sweats.  Denies any sore throat, runny nose or ear pressure or change in hearing.  No falls or head injury.  Does have history of glaucoma reportedly and type 2 diabetes insulin-dependent.  Previously seen by her family practitioner earlier today.     Exam: ENT exam is benign.  Patient reports normal sensation over all 3 distributions of the trigeminal nerve.  Cranial nerves II through XII intact.  Strength 5 out of 5 in all 4 extremities.  Sensation intact to light touch in all 4 extremities.  No dysdiadochokinesia, no dysmetria.  Gait is narrow and stable.  NIH 0.  GCS 15.  Cardiopulmonary exam benign with clear lungs and heart regular rate and rhythm.  Patient mildly anxious appearing.     Plan: Basic laboratory studies to be obtained.  Given patient symptomatology been ongoing for the last 4 days I do not believe that brain attack activation is appropriate.  There is no sign of neurologic decompensation or deficit.  Does not appear to have any herpetic lesions or sign of Bell's palsy.  Will defer need for intracranial imaging to the main ED physician.           Roger Sutton DO  03/04/24 8977

## 2024-03-04 NOTE — ED PROVIDER NOTES
I saw this patient very briefly as the Physician in Triage for rapid assessment, to establish acuity and develop basic plan of care. A more thorough history and physical exam will be documented by treating physician and/or JB in the emergency department.    History: 33-year-old female presenting for 4 days of right-sided facial tingling and tingling over the right tip of the tongue.  Denies any aphasia, dysarthria.  Denies any focal weakness, numbness or tingling.  She has no history of TMJ previously diagnosed by ENT but has not followed up with them.  She does endorse mild jaw pain.  She denies any headache, neck pain, neck stiffness or fevers chills or sweats.  Denies any sore throat, runny nose or ear pressure or change in hearing.  No falls or head injury.  Does have history of glaucoma reportedly and type 2 diabetes insulin-dependent.  Previously seen by her family practitioner earlier today.    Exam: ENT exam is benign.  Patient reports normal sensation over all 3 distributions of the trigeminal nerve.  Cranial nerves II through XII intact.  Strength 5 out of 5 in all 4 extremities.  Sensation intact to light touch in all 4 extremities.  No dysdiadochokinesia, no dysmetria.  Gait is narrow and stable.  NIH 0.  GCS 15.  Cardiopulmonary exam benign with clear lungs and heart regular rate and rhythm.  Patient mildly anxious appearing.    Plan: Basic laboratory studies to be obtained.  Given patient symptomatology been ongoing for the last 4 days I do not believe that brain attack activation is appropriate.  There is no sign of neurologic decompensation or deficit.  Does not appear to have any herpetic lesions or sign of Bell's palsy.  Will defer need for intracranial imaging to the main ED physician.         Roger Sutton,   03/04/24 1631       Roger Sutton, DO  03/04/24 1737

## 2024-03-04 NOTE — DISCHARGE INSTRUCTIONS
Seek immediate medical attention if you develop: severe headache, nausea, vomiting, confusion, weakness, loss of motion in your arms or legs, loss of control of your urine or stool, difficulty waking from sleep, neck pain, fever, or any new or worsening symptoms.

## 2024-03-04 NOTE — PROGRESS NOTES
"Subjective   Patient ID: Sarah Gallagher is a 33 y.o. female who presents for Hypertension (Shakes and tongue has been tingling for 4 days /Pt has not taken trulicity).    HPI     Patient is here concerned about her blood pressure readings stated she checked her blood pressure few hours ago and it was 135/70.  Complaining of right-sided jaw pain, was diagnosed with TMJ few months ago.  Has an appointment with ENT next few days  Complaining of tingling of the tip of the tongue since last 4 days.  Hands are feeling shaky however blood sugars are within normal range.  She is worried that she is going to have a stroke, discussed with patient that her symptoms do not suggest stroke   Stated she is stressed since last 4 days  Denied any weakness    Review of Systems   Constitutional:  Negative for chills, fatigue and unexpected weight change.   HENT:  Negative for postnasal drip, sinus pressure and trouble swallowing.         Right jaw pain   Respiratory:  Negative for cough, shortness of breath and wheezing.    Cardiovascular:  Negative for chest pain, palpitations and leg swelling.   Gastrointestinal:  Negative for abdominal pain, blood in stool, nausea and vomiting.   Endocrine: Negative for polydipsia, polyphagia and polyuria.   Genitourinary:  Negative for dysuria and frequency.   Musculoskeletal:  Positive for arthralgias. Negative for back pain and myalgias.   Skin:  Negative for rash.   Neurological:  Negative for tremors, seizures and numbness.   Psychiatric/Behavioral:  Negative for behavioral problems. The patient is nervous/anxious.        Objective   /68 (BP Location: Left arm, Patient Position: Sitting, BP Cuff Size: Small adult)   Pulse 100   Temp 36.7 °C (98 °F) (Temporal)   Ht 1.473 m (4' 10\")   Wt 60.8 kg (134 lb)   SpO2 99%   BMI 28.01 kg/m²     Physical Exam  Constitutional:       General: She is not in acute distress.  HENT:      Head: Normocephalic and atraumatic.   Eyes:      " Extraocular Movements: Extraocular movements intact.      Conjunctiva/sclera: Conjunctivae normal.      Pupils: Pupils are equal, round, and reactive to light.   Cardiovascular:      Rate and Rhythm: Regular rhythm. Tachycardia present.      Pulses: Normal pulses.      Heart sounds: No murmur heard.  Pulmonary:      Effort: Pulmonary effort is normal.      Breath sounds: Normal breath sounds. No wheezing or rales.   Abdominal:      General: Bowel sounds are normal.      Palpations: Abdomen is soft. There is no mass.      Tenderness: There is no abdominal tenderness. There is no guarding.   Musculoskeletal:      Right lower leg: No edema.      Left lower leg: No edema.   Skin:     General: Skin is warm and dry.      Findings: No lesion.   Neurological:      General: No focal deficit present.      Mental Status: She is alert and oriented to person, place, and time.      Cranial Nerves: No cranial nerve deficit.      Gait: Gait normal.   Psychiatric:         Mood and Affect: Mood is anxious.         Assessment/Plan        Sarah was seen today for hypertension.  Diagnoses and all orders for this visit:  Right facial numbness (Primary)  -     MR brain wo IV contrast; Future  Diabetes mellitus without complication (CMS/HCC)  -     insulin detemir (Levemir FlexPen) 100 unit/mL (3 mL) pen; Inject 15 Units under the skin once daily at bedtime.  Numbness of tongue     Patient decided to go to the ER as she did not want to wait to get MRI in the next few days    Current Outpatient Medications   Medication Instructions    acetaminophen (Tylenol) 325 mg tablet     albuterol 108 (90 Base) MCG/ACT inhaler 2 puffs, inhalation, Every 6 hours PRN    blood sugar diagnostic (OneTouch Verio test strips) strip      blood sugar diagnostic strip USE EVERY 12 HOURS AS DIRECTED    blood-glucose meter (Accu-Chek Guide Glucose Meter) misc Use as directed to check blood sugar    brimonidine (AlphaGAN) 0.2 % ophthalmic solution USE 1 DROP IN  "BOTH EYES TWICE DAILY.    ergocalciferol (Vitamin D-2) 1.25 MG (97729 UT) capsule 1 capsule, oral, Weekly    FreeStyle Ricky 14 Day Sensor kit As directed    ibuprofen 800 mg, oral, Every 8 hours PRN    insulin lispro (HUMALOG) 10 Units, subcutaneous, 3 times daily with meals, Take 2U for 150-199 blood sugars, 4U for 200-249, 6U for 250-299, 8U for 300-349, and 10 U for > 350 blood sugar level    Januvia 100 mg, oral, Daily    lancets (OneTouch Delica Plus Lancet) 30 gauge misc 1 strip, miscellaneous, 2 times daily    lancets 33 gauge misc Use as directed    Levemir FlexPen 15 Units, subcutaneous, Nightly    pen needle, diabetic 32 gauge x 5/32\" needle Use every day with Levemir    timolol (Timoptic) 0.5 % ophthalmic solution USE 1 DROP IN BOTH EYES TWICE DAILY.    Trulicity 0.75 mg, subcutaneous, Weekly      "

## 2024-03-04 NOTE — ED PROVIDER NOTES
HPI   Chief Complaint   Patient presents with    Facial Pain       History: 33-year-old female presenting for 4 days of right-sided facial tingling and tingling over the right tip of the tongue.  Denies any aphasia, dysarthria.  Denies any focal weakness, numbness or tingling.  She has no history of TMJ previously diagnosed by ENT but has not followed up with them.  She does endorse mild jaw pain.  She denies any headache, neck pain, neck stiffness or fevers chills or sweats.  Denies any sore throat, runny nose or ear pressure or change in hearing.  No falls or head injury.  Does have history of glaucoma reportedly and type 2 diabetes insulin-dependent.  Previously seen by her family practitioner earlier today who recommended outpatient evaluation by the patient due to significant anxiety wanted to come to the emergency room for second opinion.                          [place] Coma Scale Score: 15                     Patient History   Past Medical History:   Diagnosis Date    Personal history of other diseases of the nervous system and sense organs 09/03/2020    History of glaucoma    Personal history of other endocrine, nutritional and metabolic disease     History of diabetes mellitus    Personal history of other mental and behavioral disorders     History of depression    Personal history of other specified conditions     History of shortness of breath    Personal history of other specified conditions     History of urinary frequency    Type 2 diabetes mellitus affecting pregnancy in third trimester, antepartum 09/20/2023     Past Surgical History:   Procedure Laterality Date    KNEE Left     knee scope     Family History   Problem Relation Name Age of Onset    Lung cancer Other grand mother      Social History     Tobacco Use    Smoking status: Never    Smokeless tobacco: Never   Vaping Use    Vaping Use: Never used   Substance Use Topics    Alcohol use: Never    Drug use: Never       Physical Exam   ED Triage  Vitals [03/04/24 1616]   Temperature Heart Rate Respirations BP   36.6 °C (97.9 °F) 78 16 (!) 144/101      Pulse Ox Temp Source Heart Rate Source Patient Position   100 % Temporal -- Sitting      BP Location FiO2 (%)     Left arm --       Physical Exam  Vitals and nursing note reviewed.   Constitutional:       General: She is not in acute distress.     Appearance: She is well-developed. She is not ill-appearing.   HENT:      Head: Normocephalic and atraumatic.      Nose: No congestion or rhinorrhea.      Mouth/Throat:      Mouth: Mucous membranes are moist.      Pharynx: No oropharyngeal exudate or posterior oropharyngeal erythema.   Eyes:      Conjunctiva/sclera: Conjunctivae normal.   Cardiovascular:      Rate and Rhythm: Normal rate and regular rhythm.      Pulses: Normal pulses.      Heart sounds: No murmur heard.     No gallop.   Pulmonary:      Effort: Pulmonary effort is normal. No respiratory distress.      Breath sounds: Normal breath sounds. No stridor. No wheezing, rhonchi or rales.   Abdominal:      General: Bowel sounds are normal. There is no distension.      Palpations: Abdomen is soft.      Tenderness: There is no abdominal tenderness. There is no guarding or rebound.   Musculoskeletal:         General: No swelling.      Cervical back: Neck supple.   Skin:     General: Skin is warm and dry.      Capillary Refill: Capillary refill takes less than 2 seconds.      Findings: No rash.   Neurological:      General: No focal deficit present.      Mental Status: She is alert and oriented to person, place, and time.      Cranial Nerves: No cranial nerve deficit.      Sensory: No sensory deficit.      Gait: Gait normal.      Comments: Cranial nerves II through XII intact.  Strength 5 out of 5 in all 4 extremities.  Sensation intact to light touch in all 4 extremities.  No dysdiadochokinesia, no dysmetria.  Gait is narrow and stable.  No sign of facial weakness.  Tongue is midline.  Uvula midline.  No change in  phonation.  Normal tolerance of oral secretions.  NIH 0.  GCS 15.   Psychiatric:         Mood and Affect: Mood normal.         Behavior: Behavior normal.         Thought Content: Thought content normal.      Comments: Mildly anxious appearing         ED Course & MDM   ED Course as of 03/04/24 1922   Mon Mar 04, 2024   1847 Patient reevaluated this time.  Remains neurologically intact.  Mentating normally with no sign of cranial nerve deficit.  Will discharge home with outpatient follow-up. [TL]      ED Course User Index  [TL] Roger Sutton DO         Diagnoses as of 03/04/24 1922   Facial paresthesia       Medical Decision Making  Patient presenting for second opinion for facial pain times last 4 days.  No sign of abscess or facial infection.  No sign of meningismus.  Neurologically intact manage not suspect acute stroke.  No Le syndrome.  No ptosis myosis or other sign of ocular abnormality.  Patient advised on the broad differential for this including potential trigeminal neuralgia, TMJ syndrome.  She does appear to be mildly anxious.  Will obtain basic laboratory studies to evaluate for any underlying metabolic derangement or electrolyte abnormality that could be causing this such as hypocalcemia.  I discussed patient's case with her primary physician Dr. Crawford who is in agreement this can be done as an outpatient will order the patient MRI.  Patient made aware of this.  I informed her that I did not feel that CT imaging of the head was indicated at this time as there is no obvious signs of neurologic decompensation or abnormality.  Objectively she has normal sensation on examination and only has mild paresthesia intermittently.  No sign of cranial nerve deficit on examination.  No cranial nerve neuropathy.  No other sign of physical examination or neurologic examination of upper or lower motor neuron lesions.    Patient laboratory studies are gross unremarkable no sign of metabolic derangement or electrolyte  abnormality.  TSH within normal limits.  Patient remains neurologically intact on repeat evaluation.  Case discussed with her primary care physician who is comfortable with patient being followed up as an outpatient which was her recommendation.  Advised patient of outpatient MRI as I do not believe that emergent indication for CT imaging was present at this time.  She has no significant headache and is neuro intact.  I advised her on the potential differential including TMJ pain, trigeminal neuralgia.  She has no signs or symptoms suggest increased ICP or mass effect of the cerebral tissue.  No red flags regarding the patient's presentation neurologically.  Discharged in stable condition    Procedure  Procedures     Roger Sutton DO  03/04/24 192

## 2024-03-05 ENCOUNTER — APPOINTMENT (OUTPATIENT)
Dept: PRIMARY CARE | Facility: CLINIC | Age: 34
End: 2024-03-05
Payer: COMMERCIAL

## 2024-03-05 PROCEDURE — RXMED WILLOW AMBULATORY MEDICATION CHARGE

## 2024-03-05 RX ORDER — INSULIN DETEMIR 100 [IU]/ML
15 INJECTION, SOLUTION SUBCUTANEOUS NIGHTLY
Qty: 15 ML | Refills: 2 | Status: SHIPPED | OUTPATIENT
Start: 2024-03-05 | End: 2024-03-28 | Stop reason: ALTCHOICE

## 2024-03-05 ASSESSMENT — ENCOUNTER SYMPTOMS
NAUSEA: 0
BACK PAIN: 0
FREQUENCY: 0
UNEXPECTED WEIGHT CHANGE: 0
WHEEZING: 0
NUMBNESS: 0
SEIZURES: 0
SINUS PRESSURE: 0
COUGH: 0
DYSURIA: 0
ARTHRALGIAS: 1
TREMORS: 0
POLYDIPSIA: 0
NERVOUS/ANXIOUS: 1
ABDOMINAL PAIN: 0
BLOOD IN STOOL: 0
FATIGUE: 0
VOMITING: 0
TROUBLE SWALLOWING: 0
SHORTNESS OF BREATH: 0
PALPITATIONS: 0
CHILLS: 0
POLYPHAGIA: 0
MYALGIAS: 0

## 2024-03-07 ENCOUNTER — TELEMEDICINE (OUTPATIENT)
Dept: PRIMARY CARE | Facility: CLINIC | Age: 34
End: 2024-03-07
Payer: COMMERCIAL

## 2024-03-07 VITALS — WEIGHT: 134 LBS | BODY MASS INDEX: 28.13 KG/M2 | HEIGHT: 58 IN

## 2024-03-07 ASSESSMENT — PATIENT HEALTH QUESTIONNAIRE - PHQ9
2. FEELING DOWN, DEPRESSED OR HOPELESS: NOT AT ALL
1. LITTLE INTEREST OR PLEASURE IN DOING THINGS: NOT AT ALL
SUM OF ALL RESPONSES TO PHQ9 QUESTIONS 1 AND 2: 0

## 2024-03-07 ASSESSMENT — ENCOUNTER SYMPTOMS
DEPRESSION: 0
OCCASIONAL FEELINGS OF UNSTEADINESS: 0
LOSS OF SENSATION IN FEET: 0

## 2024-03-07 ASSESSMENT — PAIN SCALES - GENERAL: PAINLEVEL: 0-NO PAIN

## 2024-03-08 ENCOUNTER — TELEMEDICINE (OUTPATIENT)
Dept: PRIMARY CARE | Facility: CLINIC | Age: 34
End: 2024-03-08
Payer: COMMERCIAL

## 2024-03-08 ENCOUNTER — PHARMACY VISIT (OUTPATIENT)
Dept: PHARMACY | Facility: CLINIC | Age: 34
End: 2024-03-08
Payer: MEDICAID

## 2024-03-08 VITALS — HEIGHT: 58 IN | BODY MASS INDEX: 28.13 KG/M2 | WEIGHT: 134 LBS

## 2024-03-08 DIAGNOSIS — E78.5 DYSLIPIDEMIA: ICD-10-CM

## 2024-03-08 DIAGNOSIS — F41.9 ANXIETY: Primary | ICD-10-CM

## 2024-03-08 DIAGNOSIS — E11.65 TYPE 2 DIABETES MELLITUS WITH HYPERGLYCEMIA, WITHOUT LONG-TERM CURRENT USE OF INSULIN (MULTI): ICD-10-CM

## 2024-03-08 PROCEDURE — RXMED WILLOW AMBULATORY MEDICATION CHARGE

## 2024-03-08 PROCEDURE — 99442 PR PHYS/QHP TELEPHONE EVALUATION 11-20 MIN: CPT | Performed by: INTERNAL MEDICINE

## 2024-03-08 PROCEDURE — 1036F TOBACCO NON-USER: CPT | Performed by: INTERNAL MEDICINE

## 2024-03-08 RX ORDER — SERTRALINE HYDROCHLORIDE 25 MG/1
25 TABLET, FILM COATED ORAL DAILY
Qty: 90 TABLET | Refills: 0 | Status: SHIPPED | OUTPATIENT
Start: 2024-03-08 | End: 2024-03-28 | Stop reason: ALTCHOICE

## 2024-03-08 ASSESSMENT — ENCOUNTER SYMPTOMS
DEPRESSION: 0
OCCASIONAL FEELINGS OF UNSTEADINESS: 0
LOSS OF SENSATION IN FEET: 0

## 2024-03-08 NOTE — PROGRESS NOTES
"Subjective   Patient ID: Sarah Gallagher is a 33 y.o. female who presents for discuss medications.    HPI     This visit is for follow-up after ER visit  Stated she is excessively stressed since last few weeks.  She has been checking her blood sugars multiple times a day, want to stop taking insulin.  Advised to increase Trulicity to 1.5 mg, however she wants to talk to her insurance and see if Ozempic will be covered.  As her blood sugars were better controlled on Ozempic  Advised patient if Ozempic is not covered recommend increasing Trulicity to 1.5.  Due to anxiety she also decided to start antianxiety medications    Review of Systems   Constitutional:  Negative for chills, fatigue and unexpected weight change.   HENT:  Negative for postnasal drip, sinus pressure and trouble swallowing.         Right jaw pain   Respiratory:  Negative for cough, shortness of breath and wheezing.    Cardiovascular:  Negative for chest pain, palpitations and leg swelling.   Gastrointestinal:  Negative for abdominal pain, blood in stool, nausea and vomiting.   Endocrine: Negative for polydipsia, polyphagia and polyuria.   Genitourinary:  Negative for dysuria and frequency.   Musculoskeletal:  Positive for arthralgias. Negative for back pain and myalgias.   Skin:  Negative for rash.   Neurological:  Negative for tremors, seizures and numbness.   Psychiatric/Behavioral:  Negative for behavioral problems. The patient is nervous/anxious.        Objective   Ht 1.473 m (4' 10\")   Wt 60.8 kg (134 lb)   BMI 28.01 kg/m²     Physical Exam   Not done due to virtual visit    Assessment/Plan         Sarah was seen today for discuss medications.  Diagnoses and all orders for this visit:  Anxiety (Primary)  -     sertraline (Zoloft) 25 mg tablet; Take 1 tablet (25 mg) by mouth once daily.  Type 2 diabetes mellitus with hyperglycemia, without long-term current use of insulin (CMS/[facility])  Dyslipidemia  -     atorvastatin (Lipitor) 10 mg tablet; " Take 1 tablet (10 mg) by mouth once daily.     Started on Zoloft     Virtual or Telephone Consent    A telephone visit (audio only) between the patient (at the originating site) and the provider (at the distant site) was utilized to provide this telehealth service.   Verbal consent was requested and obtained from Sarah Gallagher on this date, 03/11/24 for a telehealth visit.

## 2024-03-11 RX ORDER — ATORVASTATIN CALCIUM 10 MG/1
10 TABLET, FILM COATED ORAL DAILY
Qty: 100 TABLET | Refills: 3 | Status: SHIPPED | OUTPATIENT
Start: 2024-03-11 | End: 2024-03-28 | Stop reason: ALTCHOICE

## 2024-03-11 ASSESSMENT — ENCOUNTER SYMPTOMS
NUMBNESS: 0
BLOOD IN STOOL: 0
NAUSEA: 0
VOMITING: 0
SINUS PRESSURE: 0
TREMORS: 0
UNEXPECTED WEIGHT CHANGE: 0
SEIZURES: 0
FREQUENCY: 0
DYSURIA: 0
FATIGUE: 0
POLYDIPSIA: 0
ARTHRALGIAS: 1
TROUBLE SWALLOWING: 0
NERVOUS/ANXIOUS: 1
COUGH: 0
CHILLS: 0
BACK PAIN: 0
MYALGIAS: 0
SHORTNESS OF BREATH: 0
WHEEZING: 0
ABDOMINAL PAIN: 0
POLYPHAGIA: 0
PALPITATIONS: 0

## 2024-03-12 ENCOUNTER — APPOINTMENT (OUTPATIENT)
Dept: PRIMARY CARE | Facility: CLINIC | Age: 34
End: 2024-03-12
Payer: COMMERCIAL

## 2024-03-26 ENCOUNTER — APPOINTMENT (OUTPATIENT)
Dept: ENDOCRINOLOGY | Facility: CLINIC | Age: 34
End: 2024-03-26
Payer: COMMERCIAL

## 2024-03-28 ENCOUNTER — TELEMEDICINE (OUTPATIENT)
Dept: ENDOCRINOLOGY | Facility: CLINIC | Age: 34
End: 2024-03-28
Payer: COMMERCIAL

## 2024-03-28 VITALS — HEIGHT: 58 IN | BODY MASS INDEX: 27.29 KG/M2 | WEIGHT: 130 LBS

## 2024-03-28 DIAGNOSIS — Z11.3 SCREENING FOR STDS (SEXUALLY TRANSMITTED DISEASES): ICD-10-CM

## 2024-03-28 DIAGNOSIS — Z31.41 FERTILITY TESTING: Primary | ICD-10-CM

## 2024-03-28 PROCEDURE — 99202 OFFICE O/P NEW SF 15 MIN: CPT

## 2024-03-28 PROCEDURE — 1036F TOBACCO NON-USER: CPT

## 2024-03-28 RX ORDER — DOXYCYCLINE 100 MG/1
100 CAPSULE ORAL 2 TIMES DAILY
Qty: 10 CAPSULE | Refills: 0 | Status: SHIPPED | OUTPATIENT
Start: 2024-03-28 | End: 2024-04-02

## 2024-03-28 ASSESSMENT — PAIN SCALES - GENERAL: PAINLEVEL: 0-NO PAIN

## 2024-03-28 ASSESSMENT — COLUMBIA-SUICIDE SEVERITY RATING SCALE - C-SSRS
2. HAVE YOU ACTUALLY HAD ANY THOUGHTS OF KILLING YOURSELF?: NO
6. HAVE YOU EVER DONE ANYTHING, STARTED TO DO ANYTHING, OR PREPARED TO DO ANYTHING TO END YOUR LIFE?: NO
1. IN THE PAST MONTH, HAVE YOU WISHED YOU WERE DEAD OR WISHED YOU COULD GO TO SLEEP AND NOT WAKE UP?: NO

## 2024-03-28 NOTE — PROGRESS NOTES
"Virtual Visit     NEW FERTILITY PATIENT Problem Focused VISIT (Pre Hysterosalpingogram)      Referred by: Dr. Vann OBGYN. This is the physician that placed the order and results will go to this physician.     Accompanied today by:  self     Patient is a 33 y.o.  male who presents with h/o secondary infertility, endometriosis, + GC/CT/possible PID, and left tube blocked on laparoscopy , right tube cannulized.    Allergies to contrast media: no    Allergies to Betadine: no    Previous HSG: done with surgery - both tubes blocked, right tube cannulized, left tube not able to open       OB Hx:    10- 33 wks  female \"vernell\" 4.7: PPROM & PTL      GYN HISTORY:    History of STD or PID: h/o PID possibly  @ [facility], GC/CT also in - will need doxycycline for HSG  Last pap smear: 2020: negative    MENSTRUAL HISTORY:  LMP: 3-  Cycles: 28 days, last 6-7 days    PMH: Type 2 DM & Glaucoma    PSH: Colonoscopy, knee surgery, laparoscopy     Current Meds: Trulicity, vit d, eye drops    Clotting or Bleeding D/O: no    Allergies: Morphine     BMI: 27.17    VITALS:  none taken    ASSESSMENT   Patient is a  y.o. female  with h/o secondary infertility, endometriosis, & h/o GC/CT /possible PID, who presents to review history and risks of HSG procedure that OBGYN ordered. H/O Laparoscopy : right tube cannulized, left tube not able to open     COUNSELING  Hysterosalpingogram discussed with pt. Diagnostic procedure done in radiology to determine tubal patency and/or uterine anomalies. Schedule with our front office ([phone]) between cycle day 6-11. Start Doxycycline 100 mg BID the day before HSG x 5 days. Take 800 mg Ibuprofen 60 minutes prior to scheduled procedure to help with uterine cramping. Complications from HSG are rare but can include allergic reaction to contrast media, pelvic infection, and/or vaginal bleeding.    PLAN    GC/CT ordered- pt aware she " must have this resulted prior to HSG  Start Doxycycline 100 mg BID the day before HSG x 5 days.    Pt will call day 1 of next menses to schedule HSG, is at increased risks identified. Pt denies allergies to betadine, contrast media, or other drug allergies. Pt has hx of GC/CT 2014 & possible PID- Doxycycline prescribed to start day before HSG. Pt aware to arrive 15 minutes early and present to [facility] office first for urine pregnancy test. Advised pt can take tylenol or ibuprofen OTC 1 hours prior to procedure.   Results will be sent or ordering physician. Pt aware to follow up with ordering physician.   Office number [phone], [address] [address]      Abi Crockett CNP 03/28/24 11:04 AM

## 2024-04-01 ENCOUNTER — OFFICE VISIT (OUTPATIENT)
Dept: OBSTETRICS AND GYNECOLOGY | Facility: CLINIC | Age: 34
End: 2024-04-01
Payer: COMMERCIAL

## 2024-04-01 VITALS
DIASTOLIC BLOOD PRESSURE: 80 MMHG | BODY MASS INDEX: 27.46 KG/M2 | HEIGHT: 58 IN | WEIGHT: 130.8 LBS | SYSTOLIC BLOOD PRESSURE: 120 MMHG

## 2024-04-01 DIAGNOSIS — E11.9 TYPE 2 DIABETES MELLITUS WITHOUT COMPLICATION, WITHOUT LONG-TERM CURRENT USE OF INSULIN (MULTI): ICD-10-CM

## 2024-04-01 DIAGNOSIS — N76.0 ACUTE VAGINITIS: Primary | ICD-10-CM

## 2024-04-01 PROCEDURE — 3079F DIAST BP 80-89 MM HG: CPT | Performed by: OBSTETRICS & GYNECOLOGY

## 2024-04-01 PROCEDURE — 99213 OFFICE O/P EST LOW 20 MIN: CPT | Performed by: OBSTETRICS & GYNECOLOGY

## 2024-04-01 PROCEDURE — 3074F SYST BP LT 130 MM HG: CPT | Performed by: OBSTETRICS & GYNECOLOGY

## 2024-04-01 PROCEDURE — 87205 SMEAR GRAM STAIN: CPT | Performed by: OBSTETRICS & GYNECOLOGY

## 2024-04-01 RX ORDER — DULAGLUTIDE 0.75 MG/.5ML
0.75 INJECTION, SOLUTION SUBCUTANEOUS
Qty: 4 EACH | Refills: 0 | Status: SHIPPED | OUTPATIENT
Start: 2024-04-01 | End: 2024-04-12 | Stop reason: ALTCHOICE

## 2024-04-01 ASSESSMENT — PAIN SCALES - GENERAL: PAINLEVEL: 0-NO PAIN

## 2024-04-01 ASSESSMENT — ENCOUNTER SYMPTOMS
OCCASIONAL FEELINGS OF UNSTEADINESS: 0
LOSS OF SENSATION IN FEET: 0
DEPRESSION: 0

## 2024-04-01 NOTE — PROGRESS NOTES
GYN OFFICE VISIT    Patient Name:  Sarah Gallagher  :  1990  MR #:  [id number]  Acct #:  [id number]      ASSESSMENT/PLAN:     There are no diagnoses linked to this encounter.     Sarah was seen today for vaginal itching.  Diagnoses and all orders for this visit:  Acute vaginitis (Primary)  -     Vaginitis Gram Stain For Bacterial Vaginosis + Yeast      Acute vaginitis  Await cultures.  Vaginal hygiene reviewed    Recommend pt continue to track cycles.    .All questions answered.  Diagnosis explained in detail, including differential.  Discussed healthy lifestyle modifications.    Follow up for well woman exam.      Subjective    Chief Complaint   Patient presents with    Vaginal Itching       Sarah Gallagher is a 33 y.o.  Patient's last menstrual period was 2024 (approximate).   female who presents for inflammantion in vagina, with burning, and notes last cycle with abnormal prolonged spotting.  Attempting pregnancy, type 2 DM, hx of endometriosis.   Aic 6.8.  cycles regular prior to this.      Past Medical History:   Diagnosis Date    Personal history of other diseases of the nervous system and sense organs 2020    History of glaucoma    Personal history of other endocrine, nutritional and metabolic disease     History of diabetes mellitus    Personal history of other mental and behavioral disorders     History of depression    Personal history of other specified conditions     History of shortness of breath    Personal history of other specified conditions     History of urinary frequency    Type 2 diabetes mellitus affecting pregnancy in third trimester, antepartum 2023       Past Surgical History:   Procedure Laterality Date    KNEE Left     knee scope       Social History     Socioeconomic History    Marital status: Single     Spouse name: Not on file    Number of children: Not on file    Years of education: Not on file    Highest education level: Not on file   Occupational  "History    Not on file   Tobacco Use    Smoking status: Never     Passive exposure: Never    Smokeless tobacco: Never   Vaping Use    Vaping Use: Never used   Substance and Sexual Activity    Alcohol use: Never    Drug use: Never    Sexual activity: Yes   Other Topics Concern    Not on file   Social History Narrative    Not on file     Social Determinants of Health     Financial Resource Strain: Not on file   Food Insecurity: Not on file   Transportation Needs: Not on file   Physical Activity: Not on file   Stress: Not on file   Social Connections: Not on file   Intimate Partner Violence: Not on file   Housing Stability: Not on file       Family History   Problem Relation Name Age of Onset    No Known Problems Mother      No Known Problems Father      Lung cancer Other grand mother        Prior to Admission medications    Medication Sig Start Date End Date Taking? Authorizing Provider   blood sugar diagnostic (OneTouch Verio test strips) strip   5/27/18   Historical Provider, MD   blood sugar diagnostic strip USE EVERY 12 HOURS AS DIRECTED 9/6/23 9/5/24  Ivett Crawford MD   blood-glucose meter (Accu-Chek Guide Glucose Meter) misc Use as directed to check blood sugar 12/14/23   Ivett Crawford MD   doxycycline (Vibramycin) 100 mg capsule Take 1 capsule (100 mg) by mouth 2 times a day for 5 days. Take with at least 8 ounces (large glass) of water, do not lie down for 30 minutes after. Start day before HSG. 3/28/24 4/2/24  YOUSIF Miguel-CNP   dulaglutide (Trulicity) 0.75 mg/0.5 mL pen injector Inject 0.75 mg under the skin 1 (one) time per week. 2/6/24   Ivett Crawford MD   ergocalciferol (Vitamin D-2) 1.25 MG (84713 UT) capsule Take 1 capsule (1,250 mcg) by mouth 1 (one) time per week. 4/20/22   Historical MD Ambreen   lancets 33 gauge misc Use as directed 12/14/23   Ivett Crawford MD   pen needle, diabetic 32 gauge x 5/32\" needle Use every day with Levemir 12/22/23   Ivett Crawford MD   timolol (Timoptic) 0.5 % " "ophthalmic solution USE 1 DROP IN BOTH EYES TWICE DAILY. 8/21/23 8/20/24     acetaminophen (Tylenol) 325 mg tablet  1/17/23 3/28/24  Historical Provider, MD   albuterol 108 (90 Base) MCG/ACT inhaler Inhale 2 puffs every 6 hours if needed. 10/10/18 3/28/24  Historical Provider, MD   atorvastatin (Lipitor) 10 mg tablet Take 1 tablet (10 mg) by mouth once daily.  Patient not taking: Reported on 3/28/2024 3/11/24 3/28/24  Ivett Crawford MD   brimonidine (AlphaGAN) 0.2 % ophthalmic solution USE 1 DROP IN BOTH EYES TWICE DAILY.  Patient not taking: Reported on 3/28/2024 6/27/23 3/28/24  Hilda Leroy MD   FreeStyle Ricky 14 Day Sensor kit As directed  Patient not taking: Reported on 3/28/2024 12/22/23 3/28/24  Ivett Crawford MD   ibuprofen 800 mg tablet Take 1 tablet (800 mg) by mouth every 8 hours if needed.  3/28/24  Historical Provider, MD   insulin detemir (Levemir FlexPen) 100 unit/mL (3 mL) pen Inject 15 Units under the skin once daily at bedtime.  Patient not taking: Reported on 3/28/2024 3/5/24 3/28/24  Ivett Crawford MD   insulin lispro (HumaLOG) 100 unit/mL injection Inject 10 Units under the skin 3 times a day with meals. Take 2U for 150-199 blood sugars, 4U for 200-249, 6U for 250-299, 8U for 300-349, and 10 U for > 350 blood sugar level  Patient not taking: Reported on 3/28/2024 1/16/24 3/28/24  Ivett Crawford MD   sertraline (Zoloft) 25 mg tablet Take 1 tablet (25 mg) by mouth once daily.  Patient not taking: Reported on 3/28/2024 3/8/24 3/28/24  Ivett Crawford MD   SITagliptin phosphate (Januvia) 100 mg tablet Take 1 tablet (100 mg) by mouth once daily.  Patient not taking: Reported on 3/28/2024 2/12/24 3/28/24  Ivett Crawford MD       Allergies   Allergen Reactions    Morphine GI Upset, Nausea And Vomiting, Nausea Only and Nausea/vomiting              OBJECTIVE:   /80   Ht 1.473 m (4' 10\")   Wt 59.3 kg (130 lb 12.8 oz)   LMP 03/23/2024 (Approximate)   BMI 27.34 kg/m²   Body mass index is 27.34 kg/m². "     Physical Exam  Vitals and nursing note reviewed.   Constitutional:       General: She is not in acute distress.  Cardiovascular:      Rate and Rhythm: Normal rate and regular rhythm.      Heart sounds: No murmur heard.     No friction rub. No gallop.   Pulmonary:      Effort: Pulmonary effort is normal.      Breath sounds: Normal breath sounds. No wheezing or rales.   Abdominal:      General: Bowel sounds are normal. There is no distension.      Palpations: Abdomen is soft. There is no mass.      Tenderness: There is no abdominal tenderness. There is no guarding or rebound.      Hernia: No hernia is present.   Genitourinary:     General: Normal vulva.      Labia:         Right: No rash or lesion.         Left: No rash or lesion.       Urethra: No urethral pain or urethral swelling.      Vagina: No erythema, tenderness, bleeding or lesions.      Cervix: No cervical motion tenderness, discharge, friability, lesion, erythema, cervical bleeding or eversion.      Uterus: Normal. Not enlarged and not tender.       Adnexa:         Right: No mass, tenderness or fullness.          Left: No mass, tenderness or fullness.     Neurological:      Mental Status: She is alert.   Psychiatric:         Mood and Affect: Mood normal.         Behavior: Behavior normal.         Thought Content: Thought content normal.         Judgment: Judgment normal.             Note: This dictation was generated using Dragon voice recognition software. Please excuse any grammatical or spelling errors that may have occurred using the system.

## 2024-04-02 LAB
CLUE CELLS VAG LPF-#/AREA: NORMAL /[LPF]
NUGENT SCORE: 1
YEAST VAG WET PREP-#/AREA: NORMAL

## 2024-04-04 ENCOUNTER — TELEPHONE (OUTPATIENT)
Dept: OBSTETRICS AND GYNECOLOGY | Facility: CLINIC | Age: 34
End: 2024-04-04
Payer: COMMERCIAL

## 2024-04-04 NOTE — TELEPHONE ENCOUNTER
Pt called stating you wanted her to complete an HSG test, the patient states her insurance will not cover this and she would like to know what she can do as far as next steps.

## 2024-04-08 ENCOUNTER — TELEPHONE (OUTPATIENT)
Dept: OBSTETRICS AND GYNECOLOGY | Facility: HOSPITAL | Age: 34
End: 2024-04-08

## 2024-04-08 ENCOUNTER — PHARMACY VISIT (OUTPATIENT)
Dept: PHARMACY | Facility: CLINIC | Age: 34
End: 2024-04-08
Payer: MEDICAID

## 2024-04-08 ENCOUNTER — OFFICE VISIT (OUTPATIENT)
Dept: OBSTETRICS AND GYNECOLOGY | Facility: CLINIC | Age: 34
End: 2024-04-08
Payer: COMMERCIAL

## 2024-04-08 VITALS
DIASTOLIC BLOOD PRESSURE: 84 MMHG | BODY MASS INDEX: 27.37 KG/M2 | HEIGHT: 58 IN | WEIGHT: 130.4 LBS | SYSTOLIC BLOOD PRESSURE: 120 MMHG

## 2024-04-08 DIAGNOSIS — N80.9 ENDOMETRIOSIS DETERMINED BY LAPAROSCOPY: ICD-10-CM

## 2024-04-08 DIAGNOSIS — Z01.411 ENCOUNTER FOR GYNECOLOGICAL EXAMINATION WITH ABNORMAL FINDING: ICD-10-CM

## 2024-04-08 DIAGNOSIS — N76.0 ACUTE VAGINITIS: Primary | ICD-10-CM

## 2024-04-08 DIAGNOSIS — R10.2 PELVIC PAIN: ICD-10-CM

## 2024-04-08 PROCEDURE — 87800 DETECT AGNT MULT DNA DIREC: CPT | Performed by: OBSTETRICS & GYNECOLOGY

## 2024-04-08 PROCEDURE — 1036F TOBACCO NON-USER: CPT | Performed by: OBSTETRICS & GYNECOLOGY

## 2024-04-08 PROCEDURE — RXMED WILLOW AMBULATORY MEDICATION CHARGE

## 2024-04-08 PROCEDURE — 88175 CYTOPATH C/V AUTO FLUID REDO: CPT | Mod: TC,GCY | Performed by: OBSTETRICS & GYNECOLOGY

## 2024-04-08 PROCEDURE — 99395 PREV VISIT EST AGE 18-39: CPT | Performed by: OBSTETRICS & GYNECOLOGY

## 2024-04-08 PROCEDURE — 3074F SYST BP LT 130 MM HG: CPT | Performed by: OBSTETRICS & GYNECOLOGY

## 2024-04-08 PROCEDURE — 87661 TRICHOMONAS VAGINALIS AMPLIF: CPT | Performed by: OBSTETRICS & GYNECOLOGY

## 2024-04-08 PROCEDURE — 3079F DIAST BP 80-89 MM HG: CPT | Performed by: OBSTETRICS & GYNECOLOGY

## 2024-04-08 PROCEDURE — 87624 HPV HI-RISK TYP POOLED RSLT: CPT | Performed by: OBSTETRICS & GYNECOLOGY

## 2024-04-08 PROCEDURE — 87205 SMEAR GRAM STAIN: CPT | Performed by: OBSTETRICS & GYNECOLOGY

## 2024-04-08 RX ORDER — CLOTRIMAZOLE AND BETAMETHASONE DIPROPIONATE 10; .64 MG/G; MG/G
1 CREAM TOPICAL 2 TIMES DAILY
Qty: 60 G | Refills: 0 | Status: SHIPPED | OUTPATIENT
Start: 2024-04-08 | End: 2024-05-06

## 2024-04-08 ASSESSMENT — PAIN SCALES - GENERAL: PAINLEVEL: 0-NO PAIN

## 2024-04-08 ASSESSMENT — ENCOUNTER SYMPTOMS
OCCASIONAL FEELINGS OF UNSTEADINESS: 0
LOSS OF SENSATION IN FEET: 0
DEPRESSION: 0

## 2024-04-08 NOTE — PROGRESS NOTES
Sarah Gallagher is a 33 y.o.  who presents for her annual gynecologic exam     Complains:   Endometriosis   Secondery infertility  Blocked tube on laparoscopy , one tube was canaluized   Dysparunia  Pelvic pain   Discharge       Menses:   Regular        History of abnormal pap: yes        OB History          1    Para   1    Term   0       1    AB   0    Living   1         SAB   0    IAB   0    Ectopic   0    Multiple   0    Live Births   1               Past Medical History:   Diagnosis Date    Personal history of other diseases of the nervous system and sense organs 2020    History of glaucoma    Personal history of other endocrine, nutritional and metabolic disease     History of diabetes mellitus    Personal history of other mental and behavioral disorders     History of depression    Personal history of other specified conditions     History of shortness of breath    Personal history of other specified conditions     History of urinary frequency    Type 2 diabetes mellitus affecting pregnancy in third trimester, antepartum 2023     Past Surgical History:   Procedure Laterality Date    KNEE Left     knee scope     Family History   Problem Relation Name Age of Onset    No Known Problems Mother      No Known Problems Father      Lung cancer Other grand mother      Social History     Tobacco Use    Smoking status: Never     Passive exposure: Never    Smokeless tobacco: Never   Vaping Use    Vaping Use: Never used   Substance Use Topics    Alcohol use: Never    Drug use: Never           REVIEW OF SYSTEMS  Abdomen: No abdominal pain, nausea, vomiting, diarrhea, or constipation.   No bloating, early satiety, indigestion, or increased flatulence.  Bladder: No dysuria, gross hematuria, urinary frequency, urinary urgency, or incontinence.  Breast: No breast lumps, nipple d/c, overlying skin changes, redness or skin retraction.  Allergies and current medication updated:Yes       "  EXAM:  /84   Ht 1.473 m (4' 10\")   Wt 59.1 kg (130 lb 6.4 oz)   LMP 03/23/2024 (Approximate)   BMI 27.25 kg/m²   GENERAL: pleasant, female in no apparent distress  HEENT: Normocephalic, atraumatic, mucus membranes moist and no lesions  NECK: Supple, full range of motion, no adenopathy and thyroid normal  DERMATOLOGY: Normal, without lesions, non-icteric and non-hirsute  BREAST: soft, non-tender, symmetric, no dominant mass, normal nipple-areolar complex, no lymphadenopathy and no nipple discharge  CHEST: Normal inspiratory effort  ABDOMEN: soft, non-tender and no masses  PELVIC: external genitalia red , normal Bartholin's glands, urethra, [place]'s glands, no vulvar lesions, no cervical lesions, good vaginal support, physiologic discharge present, normal appearing perineal body and perianal region  BIMANUAL: uterus normal size, shape and consistency, no adnexal masses and non-tender  RECTOVAGINAL: rectovaginal exam negative for any masses or nodularity.  NEURO: alert and oriented x3,exam grossly non-focal  EXTREMITIES: normal        ASSESSMENT:  Healthy female  Acute vaginitis: swabs done and clotrimazole sent   Infertility and pelvic pain : was awaiting for HSG but it was denied by her insurance , will try again before starting OI   Pelvic pain and dysparunis: mostly endometriosis , offered OCP, [place] ,  laparoscopy but declined at this point        PLAN:  Education reviewed and Recommended: Safe Sex/STD Prevention, Smoking Cessation, Self Breast Exams, Calcium Supplements, Weight Bearing Exercise, Low Fat, and Low  Cholesterol Diet, Skin Cancer Screening, Depression Evaluation,  Contraception: OCP (estrogen/progesterone).     Repeat Pap every 3-5 years if negative, yearly if history of abnormal Pap or cervical cancer.  HPV typing discussed with patient. No history of TERESE exposure. New Pap smear recommendations discussed.  STD counseling and prevention reviewed. Condom use encouraged.  HPV vaccine " completed.  Counseled the patient on the appropriate screening modalities for life threatening condition such as breast disease-mammogram starting at 40 years of age, lipid profile, Pap smear.     Routine Health Maintenance through patient PCP  Follow up one year and as needed.  .     Gracia Vann MD           This note was produced with voice recognition software and may contain errors in grammar, spelling and content.  If any questions, please feel free to contact me.     I was accompanied by Female Chaperone, LPN  during the exam

## 2024-04-09 ENCOUNTER — APPOINTMENT (OUTPATIENT)
Dept: PRIMARY CARE | Facility: CLINIC | Age: 34
End: 2024-04-09
Payer: COMMERCIAL

## 2024-04-09 LAB
CLUE CELLS VAG LPF-#/AREA: NORMAL /[LPF]
NUGENT SCORE: 1
YEAST VAG WET PREP-#/AREA: NORMAL

## 2024-04-10 LAB
C TRACH RRNA SPEC QL NAA+PROBE: NEGATIVE
N GONORRHOEA DNA SPEC QL PROBE+SIG AMP: NEGATIVE
T VAGINALIS RRNA SPEC QL NAA+PROBE: NEGATIVE

## 2024-04-12 ENCOUNTER — OFFICE VISIT (OUTPATIENT)
Dept: PRIMARY CARE | Facility: CLINIC | Age: 34
End: 2024-04-12
Payer: COMMERCIAL

## 2024-04-12 ENCOUNTER — APPOINTMENT (OUTPATIENT)
Dept: PRIMARY CARE | Facility: CLINIC | Age: 34
End: 2024-04-12
Payer: COMMERCIAL

## 2024-04-12 VITALS
WEIGHT: 131 LBS | HEART RATE: 90 BPM | SYSTOLIC BLOOD PRESSURE: 112 MMHG | OXYGEN SATURATION: 99 % | DIASTOLIC BLOOD PRESSURE: 78 MMHG | BODY MASS INDEX: 27.38 KG/M2 | TEMPERATURE: 99 F

## 2024-04-12 DIAGNOSIS — E11.9 TYPE 2 DIABETES MELLITUS WITHOUT COMPLICATION, WITHOUT LONG-TERM CURRENT USE OF INSULIN (MULTI): ICD-10-CM

## 2024-04-12 DIAGNOSIS — E11.65 TYPE 2 DIABETES MELLITUS WITH HYPERGLYCEMIA, WITHOUT LONG-TERM CURRENT USE OF INSULIN (MULTI): Primary | ICD-10-CM

## 2024-04-12 DIAGNOSIS — E78.5 DYSLIPIDEMIA: ICD-10-CM

## 2024-04-12 DIAGNOSIS — F41.9 ANXIETY: ICD-10-CM

## 2024-04-12 LAB — POC HEMOGLOBIN A1C: 7.7 % (ref 4.2–6.5)

## 2024-04-12 PROCEDURE — 3074F SYST BP LT 130 MM HG: CPT | Performed by: INTERNAL MEDICINE

## 2024-04-12 PROCEDURE — 1036F TOBACCO NON-USER: CPT | Performed by: INTERNAL MEDICINE

## 2024-04-12 PROCEDURE — 83036 HEMOGLOBIN GLYCOSYLATED A1C: CPT | Mod: QW,91 | Performed by: INTERNAL MEDICINE

## 2024-04-12 PROCEDURE — 99213 OFFICE O/P EST LOW 20 MIN: CPT | Performed by: INTERNAL MEDICINE

## 2024-04-12 PROCEDURE — 3078F DIAST BP <80 MM HG: CPT | Performed by: INTERNAL MEDICINE

## 2024-04-12 RX ORDER — SERTRALINE HYDROCHLORIDE 25 MG/1
25 TABLET, FILM COATED ORAL DAILY
COMMUNITY

## 2024-04-12 RX ORDER — ATORVASTATIN CALCIUM 10 MG/1
10 TABLET, FILM COATED ORAL DAILY
Qty: 100 TABLET | Refills: 3 | Status: SHIPPED | OUTPATIENT
Start: 2024-04-12 | End: 2025-05-17

## 2024-04-12 RX ORDER — DULAGLUTIDE 1.5 MG/.5ML
1.5 INJECTION, SOLUTION SUBCUTANEOUS
Qty: 2 ML | Refills: 5 | Status: SHIPPED | OUTPATIENT
Start: 2024-04-14

## 2024-04-12 ASSESSMENT — ENCOUNTER SYMPTOMS
CHILLS: 0
FREQUENCY: 0
DYSURIA: 0
POLYDIPSIA: 0
SEIZURES: 0
SHORTNESS OF BREATH: 0
POLYPHAGIA: 0
BACK PAIN: 0
TREMORS: 0
WHEEZING: 0
OCCASIONAL FEELINGS OF UNSTEADINESS: 0
NUMBNESS: 0
FATIGUE: 0
LOSS OF SENSATION IN FEET: 0
NAUSEA: 0
TROUBLE SWALLOWING: 0
MYALGIAS: 0
UNEXPECTED WEIGHT CHANGE: 0
ABDOMINAL PAIN: 0
DEPRESSION: 0
BLOOD IN STOOL: 0
VOMITING: 0
COUGH: 0
SINUS PRESSURE: 0
PALPITATIONS: 0

## 2024-04-12 ASSESSMENT — PAIN SCALES - GENERAL: PAINLEVEL: 0-NO PAIN

## 2024-04-12 NOTE — PROGRESS NOTES
Subjective   Patient ID: Sarah Gallagher is a 33 y.o. female who presents for Follow-up (3mon follow up).    HPI     Patient is here for follow-up of diabetes mellitus-she stopped checking her blood sugars as she was feeling too stressed.  Denied any hypoglycemic episodes  Stated anxiety is better since last visit  Taking Zoloft daily  Denied any other complaints today    Review of Systems   Constitutional:  Negative for chills, fatigue and unexpected weight change.   HENT:  Negative for postnasal drip, sinus pressure and trouble swallowing.    Respiratory:  Negative for cough, shortness of breath and wheezing.    Cardiovascular:  Negative for chest pain, palpitations and leg swelling.   Gastrointestinal:  Negative for abdominal pain, blood in stool, nausea and vomiting.   Endocrine: Negative for polydipsia, polyphagia and polyuria.   Genitourinary:  Negative for dysuria and frequency.   Musculoskeletal:  Negative for back pain and myalgias.   Skin:  Negative for rash.   Neurological:  Negative for tremors, seizures and numbness.   Psychiatric/Behavioral:  Positive for behavioral problems.        Objective   /78 (BP Location: Left arm, Patient Position: Sitting, BP Cuff Size: Adult)   Pulse 90   Temp 37.2 °C (99 °F) (Temporal)   Wt 59.4 kg (131 lb)   LMP 03/23/2024 (Approximate)   SpO2 99%   BMI 27.38 kg/m²     Physical Exam  Constitutional:       General: She is not in acute distress.     Appearance: Normal appearance.   HENT:      Head: Normocephalic and atraumatic.   Eyes:      Extraocular Movements: Extraocular movements intact.      Conjunctiva/sclera: Conjunctivae normal.   Cardiovascular:      Rate and Rhythm: Normal rate and regular rhythm.      Heart sounds: Normal heart sounds. No murmur heard.     No friction rub.   Pulmonary:      Effort: Pulmonary effort is normal.      Breath sounds: Normal breath sounds. No wheezing or rales.   Abdominal:      General: Bowel sounds are normal.       "Palpations: Abdomen is soft.      Tenderness: There is no abdominal tenderness. There is no guarding.   Musculoskeletal:      Right lower leg: No edema.      Left lower leg: No edema.   Neurological:      General: No focal deficit present.      Mental Status: She is alert and oriented to person, place, and time.      Cranial Nerves: No cranial nerve deficit.   Psychiatric:         Mood and Affect: Mood normal.         Assessment/Plan         Sarah was seen today for follow-up.  Diagnoses and all orders for this visit:  Type 2 diabetes mellitus with hyperglycemia, without long-term current use of insulin (Multi) (Primary)  -     POCT glycosylated hemoglobin (Hb A1C) manually resulted  -     dulaglutide (Trulicity) 1.5 mg/0.5 mL pen injector injection; Inject 1.5 mg under the skin 1 (one) time per week.  Type 2 diabetes mellitus without complication, without long-term current use of insulin (Multi)  Dyslipidemia  -     atorvastatin (Lipitor) 10 mg tablet; Take 1 tablet (10 mg) by mouth once daily.  Anxiety     Increased Trulicity to 1.5 mg from 0.75 mg  Advised low-carb diet     Lab Results   Component Value Date    HGBA1C 7.7 (A) 04/12/2024        Current Outpatient Medications   Medication Instructions    atorvastatin (LIPITOR) 10 mg, oral, Daily    blood sugar diagnostic (OneTouch Verio test strips) strip      blood sugar diagnostic strip USE EVERY 12 HOURS AS DIRECTED    blood-glucose meter (Accu-Chek Guide Glucose Meter) misc Use as directed to check blood sugar    clotrimazole-betamethasone (Lotrisone) cream 1 Application, Topical, 2 times daily    ergocalciferol (VITAMIN D-2) 1.25 mg, oral, Once Weekly    lancets 33 gauge misc Use as directed    pen needle, diabetic 32 gauge x 5/32\" needle Use every day with Levemir    sertraline (ZOLOFT) 25 mg, oral, Daily    timolol (Timoptic) 0.5 % ophthalmic solution USE 1 DROP IN BOTH EYES TWICE DAILY.    [START ON 4/14/2024] Trulicity 1.5 mg, subcutaneous, Once Weekly "

## 2024-04-15 ENCOUNTER — PHARMACY VISIT (OUTPATIENT)
Dept: PHARMACY | Facility: CLINIC | Age: 34
End: 2024-04-15
Payer: MEDICAID

## 2024-04-15 PROCEDURE — RXMED WILLOW AMBULATORY MEDICATION CHARGE

## 2024-04-16 ENCOUNTER — APPOINTMENT (OUTPATIENT)
Dept: OBSTETRICS AND GYNECOLOGY | Facility: CLINIC | Age: 34
End: 2024-04-16
Payer: COMMERCIAL

## 2024-04-16 LAB
CYTOLOGY CMNT CVX/VAG CYTO-IMP: NORMAL
HPV HR 12 DNA GENITAL QL NAA+PROBE: NEGATIVE
HPV HR GENOTYPES PNL CVX NAA+PROBE: NEGATIVE
HPV16 DNA SPEC QL NAA+PROBE: NEGATIVE
HPV18 DNA SPEC QL NAA+PROBE: NEGATIVE
LAB AP HPV GENOTYPE QUESTION: YES
LAB AP HPV HR: NORMAL
LAB AP PAP ADDITIONAL TESTS: NORMAL
LABORATORY COMMENT REPORT: NORMAL
PATH REPORT.TOTAL CANCER: NORMAL

## 2024-05-06 ENCOUNTER — PHARMACY VISIT (OUTPATIENT)
Dept: PHARMACY | Facility: CLINIC | Age: 34
End: 2024-05-06
Payer: MEDICAID

## 2024-05-06 PROCEDURE — RXMED WILLOW AMBULATORY MEDICATION CHARGE

## 2024-05-09 DIAGNOSIS — N93.9 ABNORMAL UTERINE BLEEDING (AUB): Primary | ICD-10-CM

## 2024-05-10 PROCEDURE — RXMED WILLOW AMBULATORY MEDICATION CHARGE

## 2024-05-13 ENCOUNTER — PHARMACY VISIT (OUTPATIENT)
Dept: PHARMACY | Facility: CLINIC | Age: 34
End: 2024-05-13
Payer: MEDICAID

## 2024-05-22 ENCOUNTER — TELEPHONE (OUTPATIENT)
Dept: ENDOCRINOLOGY | Facility: CLINIC | Age: 34
End: 2024-05-22
Payer: COMMERCIAL

## 2024-05-22 DIAGNOSIS — Z86.19 HISTORY OF CHLAMYDIA: Primary | ICD-10-CM

## 2024-05-22 RX ORDER — DOXYCYCLINE 100 MG/1
100 CAPSULE ORAL 2 TIMES DAILY
Qty: 10 CAPSULE | Refills: 0 | Status: SHIPPED | OUTPATIENT
Start: 2024-05-22 | End: 2024-05-27

## 2024-05-22 NOTE — TELEPHONE ENCOUNTER
Called patient back. Doxycycline order timed out, re ordered it to patient's pharmacy to be signed off by NP.   NINA ELIZABETH on 5/22/24 at 3:37 PM.

## 2024-05-30 DIAGNOSIS — Z01.812 ENCOUNTER FOR PREPROCEDURAL LABORATORY EXAMINATION: Primary | ICD-10-CM

## 2024-05-31 ENCOUNTER — APPOINTMENT (OUTPATIENT)
Dept: RADIOLOGY | Facility: HOSPITAL | Age: 34
End: 2024-05-31
Payer: COMMERCIAL

## 2024-06-03 DIAGNOSIS — H04.123 DRY EYES: Primary | ICD-10-CM

## 2024-06-03 RX ORDER — BRIMONIDINE TARTRATE 2 MG/ML
SOLUTION/ DROPS OPHTHALMIC
Qty: 10 ML | Refills: 5 | Status: SHIPPED | OUTPATIENT
Start: 2024-06-03 | End: 2025-06-03

## 2024-06-03 RX ORDER — LATANOPROST 50 UG/ML
SOLUTION/ DROPS OPHTHALMIC
Qty: 7.5 ML | Refills: 5 | Status: SHIPPED | OUTPATIENT
Start: 2024-06-03 | End: 2025-06-03

## 2024-06-07 ENCOUNTER — PHARMACY VISIT (OUTPATIENT)
Dept: PHARMACY | Facility: CLINIC | Age: 34
End: 2024-06-07
Payer: MEDICAID

## 2024-06-07 PROCEDURE — RXMED WILLOW AMBULATORY MEDICATION CHARGE

## 2024-06-07 PROCEDURE — RXOTC WILLOW AMBULATORY OTC CHARGE

## 2024-06-10 PROCEDURE — RXMED WILLOW AMBULATORY MEDICATION CHARGE

## 2024-06-14 ENCOUNTER — PHARMACY VISIT (OUTPATIENT)
Dept: PHARMACY | Facility: CLINIC | Age: 34
End: 2024-06-14
Payer: MEDICAID

## 2024-06-27 ENCOUNTER — TELEPHONE (OUTPATIENT)
Dept: ENDOCRINOLOGY | Facility: CLINIC | Age: 34
End: 2024-06-27
Payer: COMMERCIAL

## 2024-06-27 DIAGNOSIS — Z01.812 ENCOUNTER FOR PREPROCEDURAL LABORATORY EXAMINATION: ICD-10-CM

## 2024-06-27 RX ORDER — DOXYCYCLINE 100 MG/1
100 CAPSULE ORAL 2 TIMES DAILY
Qty: 10 CAPSULE | Refills: 0 | Status: SHIPPED | OUTPATIENT
Start: 2024-06-27

## 2024-06-27 NOTE — TELEPHONE ENCOUNTER
Placed call to patient. Patient needs to get her doxycycline for her HSG on Monday and previous order was . Re-pended orders for doxycycline to [facility] on [address] per patient's request. Pended and sent orders for provider sign off. Patient verbalized understanding, no further questions at this time.   KIRAN ANN RN 2024 14:23

## 2024-06-27 NOTE — TELEPHONE ENCOUNTER
Reason for call: Patient is scheduled for an HSG on Monday calling to get an Rx doxycycline to be sent to her pharmacy.  Notes:

## 2024-07-01 ENCOUNTER — ANCILLARY PROCEDURE (OUTPATIENT)
Dept: ENDOCRINOLOGY | Facility: CLINIC | Age: 34
End: 2024-07-01
Payer: COMMERCIAL

## 2024-07-01 ENCOUNTER — HOSPITAL ENCOUNTER (OUTPATIENT)
Dept: RADIOLOGY | Facility: HOSPITAL | Age: 34
Discharge: HOME | End: 2024-07-01
Payer: COMMERCIAL

## 2024-07-01 DIAGNOSIS — N93.9 ABNORMAL UTERINE BLEEDING (AUB): ICD-10-CM

## 2024-07-01 DIAGNOSIS — Z01.812 ENCOUNTER FOR PREPROCEDURAL LABORATORY EXAMINATION: ICD-10-CM

## 2024-07-01 DIAGNOSIS — N70.11 HYDROSALPINX: Primary | ICD-10-CM

## 2024-07-01 LAB — PREGNANCY TEST URINE, POC: NEGATIVE

## 2024-07-01 PROCEDURE — 2550000001 HC RX 255 CONTRASTS: Performed by: OBSTETRICS & GYNECOLOGY

## 2024-07-01 PROCEDURE — 81025 URINE PREGNANCY TEST: CPT | Performed by: NURSE PRACTITIONER

## 2024-07-01 PROCEDURE — 58340 CATHETER FOR HYSTEROGRAPHY: CPT | Performed by: NURSE PRACTITIONER

## 2024-07-01 PROCEDURE — 74740 X-RAY FEMALE GENITAL TRACT: CPT

## 2024-07-01 RX ORDER — DOXYCYCLINE 100 MG/1
100 CAPSULE ORAL 2 TIMES DAILY
Qty: 4 CAPSULE | Refills: 0 | Status: SHIPPED | OUTPATIENT
Start: 2024-07-01 | End: 2024-07-03

## 2024-07-01 RX ORDER — DOXYCYCLINE 100 MG/1
100 CAPSULE ORAL 2 TIMES DAILY
Qty: 4 CAPSULE | Refills: 0 | Status: SHIPPED | OUTPATIENT
Start: 2024-07-01 | End: 2024-07-01 | Stop reason: SDUPTHER

## 2024-07-01 NOTE — PROGRESS NOTES
Patient ID: Sarah Gallagher is a 33 y.o. female.    HSG    Date/Time: 7/1/2024 4:33 PM    Performed by: MIKAYLA Sigala  Authorized by: MIKAYLA Sigala    Consent:     Consent obtained:  Verbal and written    Consent given by:  Patient    Risks, benefits, and alternatives were discussed: yes      Risks discussed:  Bleeding, infection and pain  Universal protocol:     Procedure explained and questions answered to patient or proxy's satisfaction: yes      Relevant documents present and verified: yes      Test results available: yes      Imaging studies available: yes      Required blood products, implants, devices, and special equipment available: yes      Immediately prior to procedure, a time out was called: yes      Patient identity confirmed:  Verbally with patient, arm band and hospital-assigned identification number  Indications:     Indications:  Fertility testing  Pre-procedure details:     Skin preparation:  Povidone-iodine  Sedation:     Sedation type:  None  Anesthesia:     Anesthesia method:  None  Procedure specific details:      Assistant: done by this JB      Hysterosalpingogram (HSG) risks, benefits, alternatives, and personnel discussed with patient who agreed to proceed.    Procedural time out        Done in room where procedure done: Yes        Done just before starting procedure: Yes                                                 All members of procedural team involved in time-out: Yes                  Active communication used: Yes                                                         All team members agreed on procedure: Yes                                        Patient correctly identified by two identifiers: Yes                                  Correct side and site identified: Yes                                                     All needed special equipment/instruments available: Yes               Prior to the start of the procedure a time out was taken and the  following were verified: The identity of the patient using two patient identifiers.   Urine pregnancy test was performed and was negative.   Risks, benefits, and alternatives of the procedure were explained to the patient.  Informed consent was obtained.     The patient was placed in the dorsal lithotomy position and a sterile speculum was placed in the vagina. The cervix was sterilized with Betadine x 3. The anterior lip of the cervix was grasped with a single-tooth tenaculum. The (balloon/acorn) cannula was then placed in the cervix and secured to the tenaculum. The patient was positioned and images were taken with fluoroscopy as dye was inserted through the cannula. All instruments were then removed. The patient tolerated the procedure well and was discharged home the same day without complications.    PRELIMINARY NURSE PRACTITIONER ASSESSMENT - FOR A FINAL REPORT, PLEASE REFER TO THE FINALIZED DOCUMENTATION IN THE IMAGING TAB   Uterus: abnormal contour and filling defects noted.    Tubes:  right tube with loculation and spill, left tube with hydropsalpinx noted and no spill  Based on these findings, my recommendation is: Follow up required, chart forwarded to primary MD.    The patient was counseled regarding the above preliminary findings and understands these will be reviewed by the reading physician.     Sent in doxycycline 100mg BID for a total of 7 days.   Mackenzie CHANEL Ruiz 07/01/24 4:33 PM        Post-procedure details:     Procedure completion:  Tolerated well, no immediate complications

## 2024-07-03 ENCOUNTER — TELEPHONE (OUTPATIENT)
Dept: OBSTETRICS AND GYNECOLOGY | Facility: CLINIC | Age: 34
End: 2024-07-03
Payer: COMMERCIAL

## 2024-07-03 ENCOUNTER — TELEPHONE (OUTPATIENT)
Dept: ENDOCRINOLOGY | Facility: CLINIC | Age: 34
End: 2024-07-03
Payer: COMMERCIAL

## 2024-07-03 NOTE — TELEPHONE ENCOUNTER
Returned patient call regarding next steps following HSG on 7/1/24. Patient instructed she will need to follow up with ordering provider for next steps as she is not an established patient with us. Patient agreeable and denies further questions/concerns.   ALEXANDER CARSON on 7/3/24 at 8:52 AM.

## 2024-07-09 ENCOUNTER — OFFICE VISIT (OUTPATIENT)
Dept: OBSTETRICS AND GYNECOLOGY | Facility: CLINIC | Age: 34
End: 2024-07-09
Payer: COMMERCIAL

## 2024-07-09 VITALS
BODY MASS INDEX: 27.56 KG/M2 | HEIGHT: 58 IN | WEIGHT: 131.3 LBS | DIASTOLIC BLOOD PRESSURE: 64 MMHG | SYSTOLIC BLOOD PRESSURE: 110 MMHG

## 2024-07-09 DIAGNOSIS — N97.9 INFERTILITY, FEMALE: Primary | ICD-10-CM

## 2024-07-09 PROCEDURE — 1036F TOBACCO NON-USER: CPT | Performed by: OBSTETRICS & GYNECOLOGY

## 2024-07-09 PROCEDURE — 99213 OFFICE O/P EST LOW 20 MIN: CPT | Performed by: OBSTETRICS & GYNECOLOGY

## 2024-07-09 PROCEDURE — RXMED WILLOW AMBULATORY MEDICATION CHARGE

## 2024-07-09 PROCEDURE — 3078F DIAST BP <80 MM HG: CPT | Performed by: OBSTETRICS & GYNECOLOGY

## 2024-07-09 PROCEDURE — 3074F SYST BP LT 130 MM HG: CPT | Performed by: OBSTETRICS & GYNECOLOGY

## 2024-07-09 RX ORDER — BETA CAROTENE, CHOLECALCIFEROL, DL-ALPHA TOCOPHERYL ACETATE, ASCORBIC ACID, FOLIC ACID, THIAMIN MONONITRATE, RIBOFLAVIN, NIACINAMIDE, PYRIDOXINE HYDROCHLORIDE, CYANOCOBALAMIN, CALCIUM CARBONATE, POTAS 120; 4000; 200; 400; 8; 29; 1; 20; 150; 3; 3; 3; 15 MG/1; [IU]/1; MG/1; [IU]/1; UG/1; MG/1; MG/1; MG/1; UG/1; MG/1; MG/1; MG/1; MG/1
1 TABLET, FILM COATED ORAL DAILY
Qty: 30 EACH | Refills: 0 | Status: SHIPPED | OUTPATIENT
Start: 2024-07-09 | End: 2025-07-09

## 2024-07-09 ASSESSMENT — ENCOUNTER SYMPTOMS
OCCASIONAL FEELINGS OF UNSTEADINESS: 0
DEPRESSION: 0
LOSS OF SENSATION IN FEET: 0

## 2024-07-09 ASSESSMENT — PAIN SCALES - GENERAL: PAINLEVEL: 0-NO PAIN

## 2024-07-09 NOTE — PROGRESS NOTES
Sarah Gallagher is a 33 y.o. female,  who presented with  sec infertility     Labs discussed     HSG discussed   IMPRESSION:  1. Correlate with real time fluoroscopic findings at the time of  procedure.  2. Bilateral tubal disease with obstructed hydrosalpinx on left and  dilated tube with small amount of spill on the right, loculations  present. Filling defects suspected within the uterus.           Obstetrical History:  OB History          1    Para   1    Term   0       1    AB   0    Living   1         SAB   0    IAB   0    Ectopic   0    Multiple   0    Live Births   1                    Gynecological history:  Menarche: 15   years old   Periods Regular menses                 Past Medical History:   Diagnosis Date    Personal history of other diseases of the nervous system and sense organs 2020    History of glaucoma    Personal history of other endocrine, nutritional and metabolic disease     History of diabetes mellitus    Personal history of other mental and behavioral disorders     History of depression    Personal history of other specified conditions     History of shortness of breath    Personal history of other specified conditions     History of urinary frequency    Type 2 diabetes mellitus affecting pregnancy in third trimester, antepartum ([facility]) 2023     Past Surgical History:   Procedure Laterality Date    KNEE Left     knee scope     Family History   Problem Relation Name Age of Onset    No Known Problems Mother      No Known Problems Father      Lung cancer Other grand mother      Social History     Tobacco Use    Smoking status: Never     Passive exposure: Never    Smokeless tobacco: Never   Vaping Use    Vaping status: Never Used   Substance Use Topics    Alcohol use: Never    Drug use: Never     Social History     Tobacco Use   Smoking Status Never    Passive exposure: Never   Smokeless Tobacco Never       Current Outpatient Medications on File Prior to  "Visit   Medication Sig Dispense Refill    atorvastatin (Lipitor) 10 mg tablet Take 1 tablet (10 mg) by mouth once daily. 100 tablet 3    blood sugar diagnostic (OneTouch Verio test strips) strip        blood sugar diagnostic strip USE EVERY 12 HOURS AS DIRECTED 200 each 2    blood-glucose meter (Accu-Chek Guide Glucose Meter) misc Use as directed to check blood sugar 1 each 0    brimonidine (AlphaGAN) 0.2 % ophthalmic solution INSTILL 1 DROP IN BOTH EYES TWICE DAILY. 10 mL 5    doxycycline (Monodox) 100 mg capsule Take 1 capsule (100 mg) by mouth 2 times a day. Take with at least 8 ounces (large glass) of water, do not lie down for 30 minutes after. Start the day before HSG and then continue twice a day for 5 days total. 10 capsule 0    [] doxycycline (Vibramycin) 100 mg capsule Take 1 capsule (100 mg) by mouth 2 times a day for 2 days. Take with at least 8 ounces (large glass) of water, do not lie down for 30 minutes after 4 capsule 0    dulaglutide (Trulicity) 1.5 mg/0.5 mL pen injector injection Inject 1.5 mg under the skin 1 (one) time per week. 2 mL 5    ergocalciferol (Vitamin D-2) 1.25 MG (85653 UT) capsule Take 1 capsule (1,250 mcg) by mouth 1 (one) time per week.      lancets (OneTouch Delica Plus Lancet) 30 gauge misc Use 1 strip 2 times a day. 200 each 2    lancets 33 gauge misc Use as directed 200 each 2    latanoprost (Xalatan) 0.005 % ophthalmic solution INSTILL ONE DROP INTO BOTH EYES ONE TIME DAILY AT BEDTIME 7.5 mL 5    pen needle, diabetic 32 gauge x \" needle Use every day with Levemir 100 each 3    sertraline (Zoloft) 25 mg tablet Take 1 tablet (25 mg) by mouth once daily.      timolol (Timoptic) 0.5 % ophthalmic solution USE 1 DROP IN BOTH EYES TWICE DAILY. 10 mL 5     No current facility-administered medications on file prior to visit.     Allergies   Allergen Reactions    Morphine GI Upset, Nausea And Vomiting, Nausea Only and Nausea/vomiting         REVIEW OF SYSTEMS:    General: No " "weight loss, malaise or fevers or other constitutional symptoms.  Neuro: No history of TIA's, stroke,.  No neurological symptoms or problems. No visual changes  Respiratory: No history of respiratory/pulmonary symptoms or problems.  Cardiovascular: No history of cardiovascular symptoms or problems.  GI: No history of GI symptoms or problems.   : No history of UTI in past 6 weeks.  No history of  symptoms or problems.  BREASTS: No skin dimpling, nipple discharge or masses.  Endocrine: No history of diabetes. No Thyroid symptoms  Hematology: No history of bleeding or clotting disorder.  Skin: Negative for lesions, rash, and itching.      PHYSICAL EXAMINATION:    /64   Ht 1.473 m (4' 10\")   Wt 59.6 kg (131 lb 4.8 oz)   BMI 27.44 kg/m²    r        ASSESSMENT and Plan:    Sarah Gallagher is a 33 y.o. female,  who  presented with sec infertility   Tubal disease     Discussed results  Referral to RENE Vann MD    "

## 2024-07-10 PROCEDURE — RXMED WILLOW AMBULATORY MEDICATION CHARGE

## 2024-07-15 ENCOUNTER — PHARMACY VISIT (OUTPATIENT)
Dept: PHARMACY | Facility: CLINIC | Age: 34
End: 2024-07-15
Payer: MEDICAID

## 2024-07-23 ENCOUNTER — APPOINTMENT (OUTPATIENT)
Dept: PRIMARY CARE | Facility: CLINIC | Age: 34
End: 2024-07-23
Payer: COMMERCIAL

## 2024-08-01 ENCOUNTER — APPOINTMENT (OUTPATIENT)
Dept: PRIMARY CARE | Facility: CLINIC | Age: 34
End: 2024-08-01
Payer: COMMERCIAL

## 2024-08-16 DIAGNOSIS — E11.65 TYPE 2 DIABETES MELLITUS WITH HYPERGLYCEMIA, WITHOUT LONG-TERM CURRENT USE OF INSULIN (MULTI): Primary | ICD-10-CM

## 2024-08-16 PROCEDURE — RXMED WILLOW AMBULATORY MEDICATION CHARGE

## 2024-08-21 ENCOUNTER — PHARMACY VISIT (OUTPATIENT)
Dept: PHARMACY | Facility: CLINIC | Age: 34
End: 2024-08-21
Payer: MEDICAID

## 2024-09-03 DIAGNOSIS — E11.9 TYPE 2 DIABETES MELLITUS WITHOUT COMPLICATION, WITHOUT LONG-TERM CURRENT USE OF INSULIN (MULTI): Primary | ICD-10-CM

## 2024-09-03 PROCEDURE — RXMED WILLOW AMBULATORY MEDICATION CHARGE

## 2024-09-03 RX ORDER — METFORMIN HYDROCHLORIDE 1000 MG/1
1000 TABLET ORAL
Qty: 180 TABLET | Refills: 1 | Status: SHIPPED | OUTPATIENT
Start: 2024-09-03 | End: 2025-03-02

## 2024-09-04 ENCOUNTER — PHARMACY VISIT (OUTPATIENT)
Dept: PHARMACY | Facility: CLINIC | Age: 34
End: 2024-09-04
Payer: MEDICAID

## 2024-09-11 ENCOUNTER — APPOINTMENT (OUTPATIENT)
Dept: PRIMARY CARE | Facility: CLINIC | Age: 34
End: 2024-09-11
Payer: COMMERCIAL

## 2024-09-19 PROCEDURE — RXMED WILLOW AMBULATORY MEDICATION CHARGE

## 2024-09-20 ENCOUNTER — OFFICE VISIT (OUTPATIENT)
Dept: PRIMARY CARE | Facility: CLINIC | Age: 34
End: 2024-09-20
Payer: COMMERCIAL

## 2024-09-20 VITALS
HEART RATE: 93 BPM | TEMPERATURE: 98.4 F | WEIGHT: 128 LBS | SYSTOLIC BLOOD PRESSURE: 108 MMHG | BODY MASS INDEX: 26.75 KG/M2 | OXYGEN SATURATION: 98 % | DIASTOLIC BLOOD PRESSURE: 70 MMHG

## 2024-09-20 DIAGNOSIS — G56.02 LEFT CARPAL TUNNEL SYNDROME: Primary | ICD-10-CM

## 2024-09-20 DIAGNOSIS — E11.65 TYPE 2 DIABETES MELLITUS WITH HYPERGLYCEMIA, WITHOUT LONG-TERM CURRENT USE OF INSULIN: ICD-10-CM

## 2024-09-20 PROBLEM — J45.909 ASTHMA DURING PREGNANCY (HHS-HCC): Status: RESOLVED | Noted: 2023-09-20 | Resolved: 2024-09-20

## 2024-09-20 PROBLEM — E11.40 TYPE 2 DIABETES MELLITUS WITH DIABETIC NEUROPATHY, UNSPECIFIED (MULTI): Status: RESOLVED | Noted: 2023-09-20 | Resolved: 2024-09-20

## 2024-09-20 PROBLEM — O99.519 ASTHMA DURING PREGNANCY (HHS-HCC): Status: RESOLVED | Noted: 2023-09-20 | Resolved: 2024-09-20

## 2024-09-20 PROBLEM — J45.909 ASTHMA: Status: RESOLVED | Noted: 2023-09-20 | Resolved: 2024-09-20

## 2024-09-20 LAB — POC HEMOGLOBIN A1C: 7.5 % (ref 4.2–6.5)

## 2024-09-20 PROCEDURE — 83036 HEMOGLOBIN GLYCOSYLATED A1C: CPT | Mod: QW | Performed by: INTERNAL MEDICINE

## 2024-09-20 PROCEDURE — 3074F SYST BP LT 130 MM HG: CPT | Performed by: INTERNAL MEDICINE

## 2024-09-20 PROCEDURE — 1036F TOBACCO NON-USER: CPT | Performed by: INTERNAL MEDICINE

## 2024-09-20 PROCEDURE — 3078F DIAST BP <80 MM HG: CPT | Performed by: INTERNAL MEDICINE

## 2024-09-20 PROCEDURE — 99213 OFFICE O/P EST LOW 20 MIN: CPT | Performed by: INTERNAL MEDICINE

## 2024-09-20 ASSESSMENT — ENCOUNTER SYMPTOMS
POLYPHAGIA: 0
UNEXPECTED WEIGHT CHANGE: 0
POLYDIPSIA: 0
VOMITING: 0
DEPRESSION: 0
CHILLS: 0
WHEEZING: 0
BLOOD IN STOOL: 0
LOSS OF SENSATION IN FEET: 0
TREMORS: 0
FREQUENCY: 0
ABDOMINAL PAIN: 0
FATIGUE: 0
TROUBLE SWALLOWING: 0
SHORTNESS OF BREATH: 0
OCCASIONAL FEELINGS OF UNSTEADINESS: 0
PALPITATIONS: 0
SEIZURES: 0
MYALGIAS: 0
DYSURIA: 0
NAUSEA: 0
BACK PAIN: 0
SINUS PRESSURE: 0
NUMBNESS: 1
COUGH: 0

## 2024-09-20 ASSESSMENT — PAIN SCALES - GENERAL: PAINLEVEL: 0-NO PAIN

## 2024-09-20 NOTE — PROGRESS NOTES
Subjective   Patient ID: Sarah Gallagher is a 34 y.o. female who presents for Hand Pain (Fingers had a tingly feeling that lasted 5 days).    HPI     Tingling in left hand fingers for 3-4 days, which has resolved now. Has intermittent neck pain and some wrist pain  Her daughter sleeps with her, sometimes on her arm  Stated symptoms resolved now  Has no weakness in her left hand    Review of Systems   Constitutional:  Negative for chills, fatigue and unexpected weight change.   HENT:  Negative for postnasal drip, sinus pressure and trouble swallowing.    Respiratory:  Negative for cough, shortness of breath and wheezing.    Cardiovascular:  Negative for chest pain, palpitations and leg swelling.   Gastrointestinal:  Negative for abdominal pain, blood in stool, nausea and vomiting.   Endocrine: Negative for polydipsia, polyphagia and polyuria.   Genitourinary:  Negative for dysuria and frequency.   Musculoskeletal:  Negative for back pain and myalgias.   Skin:  Negative for rash.   Neurological:  Positive for numbness. Negative for tremors and seizures.   Psychiatric/Behavioral:  Negative for behavioral problems.        Objective   /70 (BP Location: Left arm, Patient Position: Sitting, BP Cuff Size: Adult)   Pulse 93   Temp 36.9 °C (98.4 °F) (Temporal)   Wt 58.1 kg (128 lb)   SpO2 98%   BMI 26.75 kg/m²     Physical Exam  Constitutional:       General: She is not in acute distress.     Appearance: Normal appearance.   HENT:      Head: Normocephalic and atraumatic.   Eyes:      Extraocular Movements: Extraocular movements intact.      Conjunctiva/sclera: Conjunctivae normal.      Pupils: Pupils are equal, round, and reactive to light.   Cardiovascular:      Rate and Rhythm: Normal rate and regular rhythm.      Heart sounds: Normal heart sounds. No murmur heard.     No friction rub.   Pulmonary:      Effort: Pulmonary effort is normal.      Breath sounds: Normal breath sounds. No wheezing or rales.    Abdominal:      General: Bowel sounds are normal.      Palpations: Abdomen is soft.      Tenderness: There is no abdominal tenderness. There is no guarding.   Musculoskeletal:      Right lower leg: No edema.      Left lower leg: No edema.   Neurological:      General: No focal deficit present.      Mental Status: She is alert and oriented to person, place, and time.      Cranial Nerves: No cranial nerve deficit.      Sensory: No sensory deficit.      Gait: Gait normal.   Psychiatric:         Mood and Affect: Mood normal.         Assessment/Plan        Sarah was seen today for hand pain.  Diagnoses and all orders for this visit:  Left carpal tunnel syndrome (Primary)  Type 2 diabetes mellitus with hyperglycemia, without long-term current use of insulin (Multi)  -     POCT glycosylated hemoglobin (Hb A1C) manually resulted    Occasional tingling likely due to carpal tunnel syndrome advised to use a wrist brace  Has follow-up with endocrinology in December  Stated blood sugars are better controlled in last few weeks    Lab Results   Component Value Date    HGBA1C 7.5 (A) 09/20/2024       Current Outpatient Medications   Medication Instructions    atorvastatin (LIPITOR) 10 mg, oral, Daily    blood sugar diagnostic (OneTouch Verio test strips) strip      blood-glucose meter (Accu-Chek Guide Glucose Meter) misc Use as directed to check blood sugar    brimonidine (AlphaGAN) 0.2 % ophthalmic solution INSTILL 1 DROP IN BOTH EYES TWICE DAILY.    doxycycline (MONODOX) 100 mg, oral, 2 times daily, Take with at least 8 ounces (large glass) of water, do not lie down for 30 minutes after. Start the day before HSG and then continue twice a day for 5 days total.    ergocalciferol (VITAMIN D-2) 1.25 mg, oral, Once Weekly    latanoprost (Xalatan) 0.005 % ophthalmic solution INSTILL ONE DROP INTO BOTH EYES ONE TIME DAILY AT BEDTIME    metFORMIN (GLUCOPHAGE) 1,000 mg, oral, 2 times daily (morning and late afternoon)    pen needle,  "diabetic 32 gauge x 5/32\" needle Use every day with Levemir    prenatal vit,calc78-iron-folic (Prenatabs FA) 29-1 mg tablet 1 tablet, oral, Daily    sertraline (ZOLOFT) 25 mg, oral, Daily    timolol (Timoptic) 0.5 % ophthalmic solution USE 1 DROP IN BOTH EYES TWICE DAILY.    Trulicity 3 mg, subcutaneous, Once Weekly       "

## 2024-09-25 ENCOUNTER — PHARMACY VISIT (OUTPATIENT)
Dept: PHARMACY | Facility: CLINIC | Age: 34
End: 2024-09-25
Payer: MEDICAID

## 2024-10-16 ENCOUNTER — PREP FOR PROCEDURE (OUTPATIENT)
Dept: ENDOCRINOLOGY | Facility: CLINIC | Age: 34
End: 2024-10-16

## 2024-10-16 ENCOUNTER — LAB (OUTPATIENT)
Dept: LAB | Facility: LAB | Age: 34
End: 2024-10-16
Payer: COMMERCIAL

## 2024-10-16 ENCOUNTER — CONSULT (OUTPATIENT)
Dept: ENDOCRINOLOGY | Facility: CLINIC | Age: 34
End: 2024-10-16
Payer: COMMERCIAL

## 2024-10-16 ENCOUNTER — PATIENT MESSAGE (OUTPATIENT)
Dept: ENDOCRINOLOGY | Facility: CLINIC | Age: 34
End: 2024-10-16
Payer: COMMERCIAL

## 2024-10-16 VITALS
DIASTOLIC BLOOD PRESSURE: 81 MMHG | HEART RATE: 93 BPM | HEIGHT: 58 IN | SYSTOLIC BLOOD PRESSURE: 118 MMHG | WEIGHT: 131.5 LBS | BODY MASS INDEX: 27.6 KG/M2

## 2024-10-16 DIAGNOSIS — R10.2 CHRONIC PELVIC PAIN IN FEMALE: ICD-10-CM

## 2024-10-16 DIAGNOSIS — N80.9 ENDOMETRIOSIS: ICD-10-CM

## 2024-10-16 DIAGNOSIS — N80.9 ENDOMETRIOSIS: Primary | ICD-10-CM

## 2024-10-16 DIAGNOSIS — Z01.818 PRE-PROCEDURAL EXAMINATION: ICD-10-CM

## 2024-10-16 DIAGNOSIS — E11.65 TYPE 2 DIABETES MELLITUS WITH HYPERGLYCEMIA, WITHOUT LONG-TERM CURRENT USE OF INSULIN: ICD-10-CM

## 2024-10-16 DIAGNOSIS — G89.29 CHRONIC PELVIC PAIN IN FEMALE: ICD-10-CM

## 2024-10-16 DIAGNOSIS — N70.11 HYDROSALPINX: ICD-10-CM

## 2024-10-16 DIAGNOSIS — Z31.41 FERTILITY TESTING: Primary | ICD-10-CM

## 2024-10-16 DIAGNOSIS — N97.9 INFERTILITY, FEMALE: ICD-10-CM

## 2024-10-16 DIAGNOSIS — N93.9 ABNORMAL UTERINE BLEEDING (AUB): ICD-10-CM

## 2024-10-16 DIAGNOSIS — N80.129 ENDOMETRIOMA: ICD-10-CM

## 2024-10-16 PROCEDURE — 83516 IMMUNOASSAY NONANTIBODY: CPT

## 2024-10-16 PROCEDURE — 36415 COLL VENOUS BLD VENIPUNCTURE: CPT

## 2024-10-16 PROCEDURE — 99215 OFFICE O/P EST HI 40 MIN: CPT | Performed by: OBSTETRICS & GYNECOLOGY

## 2024-10-16 PROCEDURE — RXMED WILLOW AMBULATORY MEDICATION CHARGE

## 2024-10-16 RX ORDER — DULAGLUTIDE 3 MG/.5ML
3 INJECTION, SOLUTION SUBCUTANEOUS
Qty: 2 ML | Refills: 5 | Status: SHIPPED | OUTPATIENT
Start: 2024-10-20

## 2024-10-16 RX ORDER — ACETAMINOPHEN 325 MG/1
975 TABLET ORAL ONCE
OUTPATIENT
Start: 2024-10-16 | End: 2024-10-16

## 2024-10-16 RX ORDER — CELECOXIB 400 MG/1
400 CAPSULE ORAL ONCE
OUTPATIENT
Start: 2024-10-16 | End: 2024-10-16

## 2024-10-16 RX ORDER — GABAPENTIN 600 MG/1
600 TABLET ORAL ONCE
OUTPATIENT
Start: 2024-10-16 | End: 2024-10-16

## 2024-10-16 ASSESSMENT — LIFESTYLE VARIABLES
HISTORY_ALCOHOL_USE: NO
TOBACCO_USE: NO
TOBACCO_USE: NO
HISTORY_ALCOHOL_USE: NO

## 2024-10-16 NOTE — PROGRESS NOTES
Visit Type: In Person    GYN NOTE    10/16/2024    Consult from:  Dr. Natarajan    History of present illness: Sarah Gallagher is a 34 y.o. female   who presents with concerns regarding hydrosalpinges and a hx of endometriosis based on dysmenorrhea- surgery performed by Dr. Natarajan in 2023 and showed left hydrosalpinx and spill of dye through right tube, also had a right ovarian cystectomy of endometrioma and CO2 laser treatment. This resulted in short term relief and now she has pain recurrence.   She has a 3 year old daughter who was delivered prematurely at 33 weeks. , NICU x 23 days.  Conceived after ozempic use (A1c was 5).   She has DM2 diagnosed in . She takes trulicity (dulaglutide) weekly. A1c is at 7.7. She has recently increased her dose. She has glaucoma since age 19. This is managed by Dr. Ivett Crawford. She is no longer taking metformin due to high dose.     2023  RIGHT OVARIAN CYST WALL, EXCISION:        FRAGMENTS OF OVARIAN TISSUE WITH FEATURES CONSISTENT WITH             ENDOMETRIOTIC CYST.        CORPUS LUTEUM.        NO EVIDENCE OF MALIGNANCY.   ENID DAMON M.D.   2023 16:36   [facility] - 2023     Interpreted By:  Elsie Hathaway,   STUDY:  [place] HYSTEROSALPINGOGRAM;  2024 7:34 am      INDICATION:  Signs/Symptoms:AUB.      COMPARISON:  None.      ACCESSION NUMBER(S):  [id number]      ORDERING CLINICIAN:  JEZ NATARAJAN      TECHNIQUE:  An HSG was performed by the radiology technician in conjunction with  the provider examination. 30 ML of water soluble contrast was infused  under fluoroscopic guidance and spot views of the uterus and  bilateral fallopian tubes were obtained. The patient tolerated the  procedure well. Fluoroscopic time was recorded by the technician  separately.      FINDINGS:  Irregularly shaped cavity with several filling defects, some of which  appear to be air bubbles but cannot rule out an intracavitary  lesion.. The fallopian tubes fill with  contrast and although some  free peritoneal spill is noted on the right, the left appears  obstructed and dilated. On both sides, there appear to be loculations  present.      IMPRESSION:  1. Correlate with real time fluoroscopic findings at the time of  procedure.  2. Bilateral tubal disease with obstructed hydrosalpinx on left and  dilated tube with small amount of spill on the right, loculations  present. Filling defects suspected within the uterus.    OBSTETRIC HISTORY     OB History          1    Para   1    Term   0       1    AB   0    Living   1         SAB   0    IAB   0    Ectopic   0    Multiple   0    Live Births   1                 MENSTRUAL HISTORY  LMP: Patient's last menstrual period was 09/10/2024.  Cycle length- q 28 days, Flow- 7 days; Heavy  Dysmenorrhea: Yes  Medications during menses: None    GYN HISTORY:  Dyspareunia/Dyschezia/Dysuria: Yes    Pelvic pain: Yes  STDs:  possible PID history?  HX of abnormal Pap: No  HX of abnormal Mammo:  No    MEDICAL HISTORY  Past Medical History:   Diagnosis Date    Personal history of other diseases of the nervous system and sense organs 2020    History of glaucoma    Personal history of other endocrine, nutritional and metabolic disease     History of diabetes mellitus    Personal history of other mental and behavioral disorders     History of depression    Personal history of other specified conditions     History of shortness of breath    Personal history of other specified conditions     History of urinary frequency    Type 2 diabetes mellitus affecting pregnancy in third trimester, antepartum ([facility] 2023       SURGICAL HISTORY   Past Surgical History:   Procedure Laterality Date    KNEE Left     knee scope   Laparoscpoy with Dr Vann in     SOCIAL HISTORY  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 100 tablet 3    blood sugar diagnostic (OneTouch Verio test strips) strip        blood-glucose meter (Accu-Chek Guide Glucose Meter) misc Use as directed to check blood sugar 1 each 0    brimonidine (AlphaGAN) 0.2 % ophthalmic solution INSTILL 1 DROP IN BOTH EYES TWICE DAILY. 10 mL 5    doxycycline (Monodox) 100 mg capsule Take 1 capsule (100 mg) by mouth 2 times a day. Take with at least 8 ounces (large glass) of water, do not lie down for 30 minutes after. Start the day before HSG and then continue twice a day for 5 days total. 10 capsule 0    ergocalciferol (Vitamin D-2) 1.25 MG (02654 UT) capsule Take 1 capsule (1,250 mcg) by mouth 1 (one) time per week.      latanoprost (Xalatan) 0.005 % ophthalmic solution INSTILL ONE DROP INTO BOTH EYES ONE TIME DAILY AT BEDTIME 7.5 mL 5    metFORMIN (Glucophage) 1,000 mg tablet Take 1 tablet (1,000 mg) by mouth 2 times daily (morning and late afternoon). 180 tablet 1    pen needle, diabetic 32 gauge x 5/32\" needle Use every day with Levemir 100 each 3    prenatal vit,calc78-iron-folic (Prenatabs FA) 29-1 mg tablet Take 1 tablet by mouth once daily. 30 each 0    sertraline (Zoloft) 25 mg tablet Take 1 tablet (25 mg) by mouth once daily.      timolol (Timoptic) 0.5 % ophthalmic solution USE 1 DROP IN BOTH EYES TWICE DAILY. 10 mL 5    [DISCONTINUED] dulaglutide 3 mg/0.5 mL pen injector Inject 3 mg under the skin 1 (one) time per week. 2 mL 1     No current facility-administered medications on file prior to visit.       ALLERGIES  Morphine    Vitals: /81   Pulse 93   Ht 1.473 m (4' 10\")   Wt 59.6 kg (131 lb 8 oz)   LMP 09/10/2024   BMI 27.48 kg/m²   Physical Exam  Deferred     Prior labs: "   CBC  Date: 3/4/2024  Plt: 304 (Ref range: 150 - 450 x10*3/uL)  Hct: 38.8 (Ref range: 36.0 - 46.0 %)    CMP  Date: 3/4/2024   BUN: 14 (Ref range: 8 - 25 mg/dL)  Cre: 0.70 (Ref range: 0.40 - 1.60 mg/dL)  AST: 12 (Ref range: 5 - 40 U/L)  ALT: 9 (Ref range: 5 - 40 U/L)    Coagulation:  Date: No results found for requested labs within last 365 days.  PROTIME: No results found for requested labs within last 365 days.  INR: No results found for requested labs within last 365 days.  APTT: No results found for requested labs within last 365 days.    Last Pregnancy Test:  Date: 2024  Negative    Prior imaging:     Assessment  Sarah Gallagher is a 34 y.o. female,   with endometriosis relapse and left sided hydrosalpinx , AUB, inguinal hernia    Plan  - Planned procedure: Diagnostic hysteroscopy, laparoscopic lysis of adhesion/removal of fallopian tube(s)- REVIEWED HER RIGHT TUBE WILL BE MAINTAINED, may remove left tube  Excision of endometriosis, chromopertubation   - Reviewed planned procedure and RBA  - Will need to coordinate with hernia surgeon for inguinal hernia - will also try to reconstruct the umbilicus which is scarred. Dr Bui will do laparoscopically. His office with reach out.   - Needs surgery order- reviewed timing    Elsie Hathaway  10/16/2024  1:55 PM

## 2024-10-17 ENCOUNTER — TELEPHONE (OUTPATIENT)
Dept: ENDOCRINOLOGY | Facility: CLINIC | Age: 34
End: 2024-10-17

## 2024-10-17 PROBLEM — N80.9 ENDOMETRIOSIS: Status: ACTIVE | Noted: 2024-10-16

## 2024-10-17 PROBLEM — N80.129 ENDOMETRIOMA: Status: ACTIVE | Noted: 2024-10-16

## 2024-10-17 PROBLEM — R10.2 CHRONIC PELVIC PAIN IN FEMALE: Status: ACTIVE | Noted: 2024-10-16

## 2024-10-17 PROBLEM — G89.29 CHRONIC PELVIC PAIN IN FEMALE: Status: ACTIVE | Noted: 2024-10-16

## 2024-10-17 PROBLEM — N93.9 ABNORMAL UTERINE BLEEDING (AUB): Status: ACTIVE | Noted: 2024-10-16

## 2024-10-17 PROBLEM — N70.11 HYDROSALPINX: Status: ACTIVE | Noted: 2024-10-16

## 2024-10-17 NOTE — TELEPHONE ENCOUNTER
Reason for call: Patient is calling to talk to Charis or Kate she is scheduled for a surgery with Dr Hathaway and also has an Hernia she is calling to check if surgery for both can be done on same day 11/22/24  Notes:

## 2024-10-17 NOTE — TELEPHONE ENCOUNTER
Called the patient she verified her name and date of birth Is general surgery available to operate with Dr esparza Patient has appointment  Nov 12 with Yazan ghosh I advised I will message Dr esparza and when I hear back I will let her know     RADHA SHAIKH 10/17/24 2:10 PM

## 2024-10-19 LAB — MIS SERPL-MCNC: 3.37 NG/ML (ref 0.18–11.71)

## 2024-10-21 NOTE — TELEPHONE ENCOUNTER
Called the patient she verified her name and date of birth I advised amh is normal Patient verbalized an understanding    RADHA SHAIKH 10/21/24 11:51 AM

## 2024-10-21 NOTE — TELEPHONE ENCOUNTER
Reason for call: wants to go over test results  Notes: advised that pt needs to schedule a FUV, but pt stated that Dr. Hathaway stated that she will call pt once results were back. Needs next steps

## 2024-10-23 ENCOUNTER — PHARMACY VISIT (OUTPATIENT)
Dept: PHARMACY | Facility: CLINIC | Age: 34
End: 2024-10-23
Payer: COMMERCIAL

## 2024-10-23 PROCEDURE — RXOTC WILLOW AMBULATORY OTC CHARGE

## 2024-10-24 ENCOUNTER — APPOINTMENT (OUTPATIENT)
Dept: ENDOCRINOLOGY | Facility: CLINIC | Age: 34
End: 2024-10-24
Payer: COMMERCIAL

## 2024-10-29 PROCEDURE — RXMED WILLOW AMBULATORY MEDICATION CHARGE

## 2024-10-30 ENCOUNTER — PHARMACY VISIT (OUTPATIENT)
Dept: PHARMACY | Facility: CLINIC | Age: 34
End: 2024-10-30
Payer: MEDICAID

## 2024-10-30 DIAGNOSIS — E11.9 TYPE 2 DIABETES MELLITUS WITHOUT COMPLICATION, WITHOUT LONG-TERM CURRENT USE OF INSULIN (MULTI): ICD-10-CM

## 2024-10-30 PROCEDURE — RXMED WILLOW AMBULATORY MEDICATION CHARGE

## 2024-10-30 RX ORDER — FLASH GLUCOSE SENSOR
KIT MISCELLANEOUS
Qty: 2 EACH | Refills: 11 | Status: CANCELLED | OUTPATIENT
Start: 2024-10-30 | End: 2025-10-30

## 2024-10-30 RX ORDER — DEXTROSE 4 G
TABLET,CHEWABLE ORAL
Qty: 1 EACH | Refills: 0 | OUTPATIENT
Start: 2024-10-30

## 2024-10-30 RX ORDER — BLOOD-GLUCOSE METER
EACH MISCELLANEOUS
Qty: 200 EACH | Refills: 2 | Status: CANCELLED | OUTPATIENT
Start: 2024-10-30 | End: 2025-10-30

## 2024-11-01 ENCOUNTER — OFFICE VISIT (OUTPATIENT)
Dept: SURGERY | Facility: CLINIC | Age: 34
End: 2024-11-01
Payer: COMMERCIAL

## 2024-11-01 VITALS
HEART RATE: 79 BPM | TEMPERATURE: 97.9 F | DIASTOLIC BLOOD PRESSURE: 82 MMHG | HEIGHT: 58 IN | BODY MASS INDEX: 27.5 KG/M2 | SYSTOLIC BLOOD PRESSURE: 124 MMHG | WEIGHT: 131 LBS

## 2024-11-01 DIAGNOSIS — K41.90 FEMORAL HERNIA OF RIGHT SIDE: Primary | ICD-10-CM

## 2024-11-01 PROCEDURE — 3008F BODY MASS INDEX DOCD: CPT | Performed by: SURGERY

## 2024-11-01 PROCEDURE — 3079F DIAST BP 80-89 MM HG: CPT | Performed by: SURGERY

## 2024-11-01 PROCEDURE — 99204 OFFICE O/P NEW MOD 45 MIN: CPT | Performed by: SURGERY

## 2024-11-01 PROCEDURE — 3074F SYST BP LT 130 MM HG: CPT | Performed by: SURGERY

## 2024-11-01 PROCEDURE — 99214 OFFICE O/P EST MOD 30 MIN: CPT | Performed by: SURGERY

## 2024-11-01 PROCEDURE — 1036F TOBACCO NON-USER: CPT | Performed by: SURGERY

## 2024-11-01 RX ORDER — CEFAZOLIN SODIUM 2 G/100ML
2 INJECTION, SOLUTION INTRAVENOUS ONCE
OUTPATIENT
Start: 2024-11-01 | End: 2024-11-01

## 2024-11-01 ASSESSMENT — ENCOUNTER SYMPTOMS: DEPRESSION: 0

## 2024-11-01 ASSESSMENT — PAIN SCALES - GENERAL: PAINLEVEL_OUTOF10: 8

## 2024-11-07 ENCOUNTER — TELEPHONE (OUTPATIENT)
Dept: ENDOCRINOLOGY | Facility: CLINIC | Age: 34
End: 2024-11-07

## 2024-11-07 NOTE — TELEPHONE ENCOUNTER
Called the patient she verified her name and date of birth I advised to abstain from sex starting with her period I advised to do her labs Nov 20 Nov 21 I advised any  lab is ok I advised I would speak with anesthesia about trulicity 4.5 mg takes once a week on Saturday I advised yes she can take jardiance and metformin Patient is type 2 diabetic I advised I would my chart her tomorrow    RISA HAWA 11/07/24 10:45 AM

## 2024-11-07 NOTE — TELEPHONE ENCOUNTER
Called the patient there was no answer Left a message to call the office    RADHA SHAIKH 11/07/24 9:18 AM

## 2024-11-08 DIAGNOSIS — Z31.83 ENCOUNTER FOR ASSISTED REPRODUCTIVE FERTILITY CYCLE: ICD-10-CM

## 2024-11-12 ENCOUNTER — APPOINTMENT (OUTPATIENT)
Dept: SURGERY | Facility: HOSPITAL | Age: 34
End: 2024-11-12
Payer: COMMERCIAL

## 2024-11-12 ENCOUNTER — CLINICAL SUPPORT (OUTPATIENT)
Dept: PREADMISSION TESTING | Facility: HOSPITAL | Age: 34
End: 2024-11-12
Payer: COMMERCIAL

## 2024-11-12 DIAGNOSIS — Z31.83 ENCOUNTER FOR ASSISTED REPRODUCTIVE FERTILITY CYCLE: ICD-10-CM

## 2024-11-15 ENCOUNTER — PRE-ADMISSION TESTING (OUTPATIENT)
Dept: PREADMISSION TESTING | Facility: HOSPITAL | Age: 34
End: 2024-11-15
Payer: COMMERCIAL

## 2024-11-15 ENCOUNTER — LAB (OUTPATIENT)
Dept: LAB | Facility: LAB | Age: 34
End: 2024-11-15
Payer: COMMERCIAL

## 2024-11-15 VITALS
OXYGEN SATURATION: 99 % | DIASTOLIC BLOOD PRESSURE: 74 MMHG | SYSTOLIC BLOOD PRESSURE: 106 MMHG | HEART RATE: 92 BPM | WEIGHT: 125.44 LBS | RESPIRATION RATE: 16 BRPM | TEMPERATURE: 98 F | HEIGHT: 58 IN | BODY MASS INDEX: 26.33 KG/M2

## 2024-11-15 DIAGNOSIS — Z01.818 PRE-PROCEDURAL EXAMINATION: ICD-10-CM

## 2024-11-15 DIAGNOSIS — Z01.818 PRE-OP TESTING: ICD-10-CM

## 2024-11-15 DIAGNOSIS — Z01.818 PRE-OP TESTING: Primary | ICD-10-CM

## 2024-11-15 LAB
ABO GROUP (TYPE) IN BLOOD: NORMAL
ANION GAP SERPL CALC-SCNC: 12 MMOL/L (ref 10–20)
ANTIBODY SCREEN: NORMAL
APPEARANCE UR: CLEAR
ATRIAL RATE: 82 BPM
BASOPHILS # BLD AUTO: 0.01 X10*3/UL (ref 0–0.1)
BASOPHILS NFR BLD AUTO: 0.2 %
BILIRUB UR STRIP.AUTO-MCNC: NEGATIVE MG/DL
BUN SERPL-MCNC: 13 MG/DL (ref 6–23)
CALCIUM SERPL-MCNC: 9.1 MG/DL (ref 8.6–10.3)
CHLORIDE SERPL-SCNC: 106 MMOL/L (ref 98–107)
CO2 SERPL-SCNC: 25 MMOL/L (ref 21–32)
COLOR UR: COLORLESS
CREAT SERPL-MCNC: 0.68 MG/DL (ref 0.5–1.05)
EGFRCR SERPLBLD CKD-EPI 2021: >90 ML/MIN/1.73M*2
EOSINOPHIL # BLD AUTO: 0.02 X10*3/UL (ref 0–0.7)
EOSINOPHIL NFR BLD AUTO: 0.3 %
ERYTHROCYTE [DISTWIDTH] IN BLOOD BY AUTOMATED COUNT: 15.1 % (ref 11.5–14.5)
GLUCOSE SERPL-MCNC: 98 MG/DL (ref 74–99)
GLUCOSE UR STRIP.AUTO-MCNC: ABNORMAL MG/DL
HCG UR QL IA.RAPID: NEGATIVE
HCT VFR BLD AUTO: 43.3 % (ref 36–46)
HGB BLD-MCNC: 14.3 G/DL (ref 12–16)
IMM GRANULOCYTES # BLD AUTO: 0.01 X10*3/UL (ref 0–0.7)
IMM GRANULOCYTES NFR BLD AUTO: 0.2 % (ref 0–0.9)
KETONES UR STRIP.AUTO-MCNC: ABNORMAL MG/DL
LEUKOCYTE ESTERASE UR QL STRIP.AUTO: NEGATIVE
LYMPHOCYTES # BLD AUTO: 1.68 X10*3/UL (ref 1.2–4.8)
LYMPHOCYTES NFR BLD AUTO: 27.5 %
MCH RBC QN AUTO: 26.8 PG (ref 26–34)
MCHC RBC AUTO-ENTMCNC: 33 G/DL (ref 32–36)
MCV RBC AUTO: 81 FL (ref 80–100)
MONOCYTES # BLD AUTO: 0.49 X10*3/UL (ref 0.1–1)
MONOCYTES NFR BLD AUTO: 8 %
NEUTROPHILS # BLD AUTO: 3.89 X10*3/UL (ref 1.2–7.7)
NEUTROPHILS NFR BLD AUTO: 63.8 %
NITRITE UR QL STRIP.AUTO: NEGATIVE
NRBC BLD-RTO: 0 /100 WBCS (ref 0–0)
P AXIS: 55 DEGREES
P OFFSET: 204 MS
P ONSET: 153 MS
PH UR STRIP.AUTO: 5.5 [PH]
PLATELET # BLD AUTO: 276 X10*3/UL (ref 150–450)
POTASSIUM SERPL-SCNC: 4.4 MMOL/L (ref 3.5–5.3)
PR INTERVAL: 138 MS
PROT UR STRIP.AUTO-MCNC: NEGATIVE MG/DL
Q ONSET: 222 MS
QRS COUNT: 14 BEATS
QRS DURATION: 84 MS
QT INTERVAL: 366 MS
QTC CALCULATION(BAZETT): 427 MS
QTC FREDERICIA: 406 MS
R AXIS: 40 DEGREES
RBC # BLD AUTO: 5.34 X10*6/UL (ref 4–5.2)
RBC # UR STRIP.AUTO: ABNORMAL /UL
RBC #/AREA URNS AUTO: NORMAL /HPF
RH FACTOR (ANTIGEN D): NORMAL
SODIUM SERPL-SCNC: 139 MMOL/L (ref 136–145)
SP GR UR STRIP.AUTO: 1.03
T AXIS: 29 DEGREES
T OFFSET: 405 MS
UROBILINOGEN UR STRIP.AUTO-MCNC: NORMAL MG/DL
VENTRICULAR RATE: 82 BPM
WBC # BLD AUTO: 6.1 X10*3/UL (ref 4.4–11.3)
WBC #/AREA URNS AUTO: NORMAL /HPF

## 2024-11-15 PROCEDURE — 81025 URINE PREGNANCY TEST: CPT

## 2024-11-15 PROCEDURE — 87081 CULTURE SCREEN ONLY: CPT | Mod: AHULAB | Performed by: NURSE PRACTITIONER

## 2024-11-15 PROCEDURE — 93005 ELECTROCARDIOGRAM TRACING: CPT | Performed by: NURSE PRACTITIONER

## 2024-11-15 PROCEDURE — 36415 COLL VENOUS BLD VENIPUNCTURE: CPT

## 2024-11-15 PROCEDURE — 86901 BLOOD TYPING SEROLOGIC RH(D): CPT

## 2024-11-15 PROCEDURE — 99204 OFFICE O/P NEW MOD 45 MIN: CPT | Performed by: NURSE PRACTITIONER

## 2024-11-15 PROCEDURE — 86900 BLOOD TYPING SEROLOGIC ABO: CPT

## 2024-11-15 PROCEDURE — 80048 BASIC METABOLIC PNL TOTAL CA: CPT

## 2024-11-15 PROCEDURE — 81001 URINALYSIS AUTO W/SCOPE: CPT

## 2024-11-15 PROCEDURE — 86850 RBC ANTIBODY SCREEN: CPT

## 2024-11-15 PROCEDURE — 93010 ELECTROCARDIOGRAM REPORT: CPT | Performed by: INTERNAL MEDICINE

## 2024-11-15 PROCEDURE — 85025 COMPLETE CBC W/AUTO DIFF WBC: CPT

## 2024-11-15 RX ORDER — CHLORHEXIDINE GLUCONATE ORAL RINSE 1.2 MG/ML
SOLUTION DENTAL
Qty: 475 ML | Refills: 0 | Status: SHIPPED | OUTPATIENT
Start: 2024-11-15

## 2024-11-15 ASSESSMENT — ENCOUNTER SYMPTOMS
MUSCULOSKELETAL NEGATIVE: 1
DIARRHEA: 0
NAUSEA: 0
NECK NEGATIVE: 1
ABDOMINAL PAIN: 1
CONSTIPATION: 1
RESPIRATORY NEGATIVE: 1
BRUISES/BLEEDS EASILY: 0
DYSPNEA WITH EXERTION: 0
DYSPNEA AT REST: 0
NEUROLOGICAL NEGATIVE: 1
PALPITATIONS: 0
VOMITING: 0
CONSTITUTIONAL NEGATIVE: 1

## 2024-11-15 NOTE — CPM/PAT H&P
[facility] Evaluation       Name: Sarah Gallagher (Sarah Gallagher)  /Age: 1990/34 y.o.         Date of Consult: 11/15/24     Referring Provider: Dr. Hathaway/Dr. Bui    Surgery, Date, and Length:     Laparoscopic Excision of Endometriosis; Bilateral Chromopertubation  Possible Bilateral Ovarian Cystectomy(psb) - Bilateral  Possible Left Salpingectomy(psb) - Left  Hysteroscopy  Right Laparoscopic Inguinal Herniorrhaphy - Right, 24, 270 min       Sarah Gallagher is a 34 year-old female who presents to the [facility] for perioperative risk assessment prior to surgery.    Patient presents with a primary diagnosis of   Endometriosis   Endometrioma   Chronic pelvic pain in female   Hydrosalpinx   Abnormal uterine bleeding (AUB)   Femoral hernia of right side     Sarah Gallagher is a 34 y.o. female   who presents with concerns regarding hydrosalpinges and a hx of endometriosis based on dysmenorrhea- surgery performed by Dr. Vann in 2023 and showed left hydrosalpinx and spill of dye through right tube, also had a right ovarian cystectomy of endometrioma and CO2 laser treatment. This resulted in short term relief and now she has pain recurrence.   She has a 3 year old daughter who was delivered prematurely at 33 weeks. , NICU x 23 days.  Conceived after ozempic use (A1c was 5).   She has DM2 diagnosed in . She takes trulicity (dulaglutide) weekly. A1c is at 7.7. She has recently increased her dose. She has glaucoma since age 19. This is managed by Dr. Ivett Crawford. She is no longer taking metformin due to high dose.      2023  RIGHT OVARIAN CYST WALL, EXCISION:        FRAGMENTS OF OVARIAN TISSUE WITH FEATURES CONSISTENT WITH             ENDOMETRIOTIC CYST.        CORPUS LUTEUM.        NO EVIDENCE OF MALIGNANCY.   ENID DAMON M.D.   2023 16:36   DJK - 2023      Interpreted By:  Elsie Hathaway,   STUDY:  FL HYSTEROSALPINGOGRAM;  2024 7:34 am    This note was created  in part upon personal review of patient's medical records.      Patient is scheduled to have  a Laparoscopic Excision of Endometriosis; Bilateral Chromopertubation  Possible Bilateral Ovarian Cystectomy(psb) - Bilateral  Possible Left Salpingectomy(psb) - Left  Hysteroscopy  Right Laparoscopic Inguinal Herniorrhaphy - Right.    Dr. Bui office note on 11/01/24  Sarah Gallagher is a 34 y.o. female presenting with history of right groin discomfort with exertions or coughing.  She has a surgery scheduled with Dr. Elsie Hathaway from gynecology.  Concern for an inguinal hernia so they wanted to have me involved for possible repair concomitantly on November 22.  Procedure planned is diagnostic hysteroscopy and laparoscopic removal of fallopian tubes and treatment of endometriosis.       Pt denies any past history of anesthetic complications such as PONV, awareness, prolonged sedation, dental damage, aspiration, cardiac arrest, difficult intubation, difficult I.V. access or unexpected hospital admissions.  NO malignant hyperthermia and or pseudocholinesterase deficiency.  No history of blood transfusions     The patient is not a Rastafari and will accept blood and blood products if medically indicated.   Type and screen sent.     Past Medical History:   Diagnosis Date    Abnormal uterine bleeding (AUB)     Plan: Laparoscopic Excision of Endometriosis; Bilateral Chromopertubation;   Possible Bilateral Ovarian Cystectomy;   Possible Left Salpingectomy;   Hysteroscopy 11/22/24    Bell's palsy     Chondromalacia, patella, right     s/p KNEE SCOPE,MENISECTOMY,MED OR LAT    KNEE SCOPE,SHAVE ARTICULAR CART    ARTHRS KNEE W LAT RELEASE    ARTHROSCOPY KNEE MENISCECTOMY MEDIAL OR LATERAL    CHONDROPLASTY KNEE    ARTHROSCOPY KNEE, SURGICAL, LATERAL RELEASE   5/24/19    Chronic pelvic pain in female     Endometrioma     Endometriosis     Femoral hernia of right side     Glaucoma     Hydrosalpinx     Hyperlipidemia      Obesity     Type 2 diabetes mellitus     4/12/24 7.7%    Vitamin D deficiency        Past Surgical History:   Procedure Laterality Date    KNEE Left     knee scope    MENISCECTOMY Right 05/24/2019    KNEE SCOPE,MENISECTOMY,MED OR LAT    KNEE SCOPE,SHAVE ARTICULAR CART    ARTHRS KNEE W LAT RELEASE    ARTHROSCOPY KNEE MENISCECTOMY MEDIAL OR LATERAL    CHONDROPLASTY KNEE    ARTHROSCOPY KNEE, SURGICAL, LATERAL RELEASE       Social History     Socioeconomic History    Marital status: Single   Tobacco Use    Smoking status: Never     Passive exposure: Never    Smokeless tobacco: Never   Vaping Use    Vaping status: Never Used   Substance and Sexual Activity    Alcohol use: Never    Drug use: Never    Sexual activity: Yes         Family History   Problem Relation Name Age of Onset    No Known Problems Mother      No Known Problems Father      Lung cancer Other grand mother        Allergies   Allergen Reactions    Morphine GI Upset and Nausea/vomiting         Current Outpatient Medications:     brimonidine (AlphaGAN) 0.2 % ophthalmic solution, INSTILL 1 DROP IN BOTH EYES TWICE DAILY., Disp: 10 mL, Rfl: 5    dulaglutide (Trulicity) 4.5 mg/0.5 mL pen injector, Inject 4.5 mg subcutaneously one time a week. (Patient taking differently: Inject under the skin every 7 days. Monday), Disp: 2 mL, Rfl: 11    empagliflozin (Jardiance) 10 mg, Take 1 tablet by mouth daily with breakfast., Disp: 30 tablet, Rfl: 11    latanoprost (Xalatan) 0.005 % ophthalmic solution, INSTILL ONE DROP INTO BOTH EYES ONE TIME DAILY AT BEDTIME, Disp: 7.5 mL, Rfl: 5    metFORMIN  mg 24 hr tablet, Take 1 tablet by mouth two times a day with meals., Disp: 60 tablet, Rfl: 11    atorvastatin (Lipitor) 10 mg tablet, Take 1 tablet (10 mg) by mouth once daily. (Patient not taking: Reported on 11/12/2024), Disp: 100 tablet, Rfl: 3    blood sugar diagnostic (OneTouch Verio test strips) strip,  , Disp: , Rfl:     blood sugar diagnostic (OneTouch Verio test  "strips) strip, USE EVERY 12 HOURS AS DIRECTED, Disp: 200 each, Rfl: 2    blood-glucose meter (OneTouch Verio Flex meter) misc, Use as directed to check blood sugar, Disp: 1 each, Rfl: 0    doxycycline (Monodox) 100 mg capsule, Take 1 capsule (100 mg) by mouth 2 times a day. Take with at least 8 ounces (large glass) of water, do not lie down for 30 minutes after. Start the day before HSG and then continue twice a day for 5 days total. (Patient not taking: Reported on 11/12/2024), Disp: 10 capsule, Rfl: 0    flash glucose sensor kit (FreeStyle Ricky 14 Day Sensor) kit, USE AS DIRECTED, Disp: 2 each, Rfl: 11    lancets (OneTouch Delica Plus Lancet) 30 gauge misc, Use 1 strip 2 times a day., Disp: 200 each, Rfl: 2    pen needle, diabetic 32 gauge x 5/32\" needle, Use every day with Levemir, Disp: 100 each, Rfl: 3    timolol (Timoptic) 0.5 % ophthalmic solution, USE 1 DROP IN BOTH EYES TWICE DAILY., Disp: 10 mL, Rfl: 5       PAT ROS:   Constitutional:   neg    Neuro/Psych:   neg    Eyes:    use of corrective lenses  Ears:    no hearing aides  Nose:   Mouth:   neg    Throat:   neg    Neck:   neg    Cardio:    Functional >4 Mets. Patient denies SOB walking up 2 flights of stairs    no chest pain   no palpitations   no dyspnea   no ROSENBAUM  Respiratory:   neg    Endocrine:   GI:    abdominal pain   constipation   no diarrhea   no nausea   no vomiting  :   neg    Musculoskeletal:   neg    Hematologic:    does not bruise/bleed easily   no history of blood transfusion   no blood clots  Skin:  neg        Physical Exam  Physical exam within normal limits.          PAT AIRWAY:   Airway:     Mallampati::  II    Neck ROM::  Full   No broken teeth, no dentures and no missing teeth           Visit Vitals  /74   Pulse 92   Temp 36.7 °C (98 °F) (Temporal)   Resp 16   Ht 1.473 m (4' 9.99\")   Wt 56.9 kg (125 lb 7.1 oz)   SpO2 99%   BMI 26.22 kg/m²   OB Status Having periods   Smoking Status Never   BSA 1.53 m² "     Plan    Cardiovascular:  Patient denies any chest pain, tightness, heaviness, pressure, radiating pain, palpitations, irregular heartbeats, lightheadedness, cough, congestion, shortness of breath, ROSENBAUM, PND, near syncope, weight loss or gain.      RCRI: 1  Risk of Mace: 6%    HLD:  Lipitor-patient not currently taking      EKG in PAT   Encounter Date: 11/15/24   ECG 12 Lead   Result Value    Ventricular Rate 82    Atrial Rate 82    ND Interval 138    QRS Duration 84    QT Interval 366    QTC Calculation(Bazett) 427    P Axis 55    R Axis 40    T Axis 29    QRS Count 14    Q Onset 222    P Onset 153    P Offset 204    T Offset 405    QTC Fredericia 406    Narrative    Normal sinus rhythm with sinus arrhythmia  Normal ECG  When compared with ECG of 27-OCT-2020 14:26,  T wave amplitude has decreased in Anterior leads           Pulm:  Denies any shortness of breath or activity intolerance    Stop Bang= 0      Renal/endo:  DM Type II-  Trulicity-hold 7 days prior to surgery  Jardiance-hold 3 days prior to surgery  Metformin-hold on dos    Heme:  Patient instructed to ambulate as soon as possible postoperatively to decrease thromboembolic risk.    Initiate mechanical DVT prophylaxis as soon as possible and initiate chemical prophylaxis when deemed safe from a bleeding standpoint post surgery.    Caprini= 3    Risk assessment complete.  Patient is scheduled for an INTERMEDIATE surgical risk procedure.           Labs/testing obtained in PAT on 11/15/24  CBC, BMP, T&S, UA, EKG, MRSA    Lab Results   Component Value Date    HGBA1C 7.5 (A) 09/20/2024     Lab Results   Component Value Date    WBC 6.1 11/15/2024    HGB 14.3 11/15/2024    HCT 43.3 11/15/2024    MCV 81 11/15/2024     11/15/2024     Lab Results   Component Value Date    GLUCOSE 98 11/15/2024    CALCIUM 9.1 11/15/2024     11/15/2024    K 4.4 11/15/2024    CO2 25 11/15/2024     11/15/2024    BUN 13 11/15/2024    CREATININE 0.68 11/15/2024      Labs reviewed, unremarkable.      Follow up/communication: MRSA pending      Preoperative medication instructions were provided and reviewed with the patient.  Any additional testing or evaluation was explained to the patient.  Nothing by mouth instructions were discussed and patient's questions were answered prior to conclusion to this encounter.  Patient verbalized understanding of preoperative instructions given in preadmission testing; discharge instructions available in EMR.    This note was dictated with speech recognition.  Minor errors may have been detected during use of speech recognition.

## 2024-11-15 NOTE — PREPROCEDURE INSTRUCTIONS
Medication List            Accurate as of November 15, 2024  8:51 AM. Always use your most recent med list.                atorvastatin 10 mg tablet  Commonly known as: Lipitor  Take 1 tablet (10 mg) by mouth once daily.  Notes to patient: Patient states not taking     blood-glucose meter misc  Commonly known as: OneTouch Verio Flex meter  Use as directed to check blood sugar     brimonidine 0.2 % ophthalmic solution  Commonly known as: AlphaGAN  INSTILL 1 DROP IN BOTH EYES TWICE DAILY.  Medication Adjustments for Surgery: Take/Use as prescribed     chlorhexidine 0.12 % solution  Commonly known as: Peridex  Swish for 30 seconds and spit 15mL of solution the night before and morning of surgery     doxycycline 100 mg capsule  Commonly known as: Monodox  Take 1 capsule (100 mg) by mouth 2 times a day. Take with at least 8 ounces (large glass) of water, do not lie down for 30 minutes after. Start the day before HSG and then continue twice a day for 5 days total.  Notes to patient: Patient states not taking     FreeStyle Ricky 14 Day Sensor kit  Generic drug: flash glucose sensor kit  USE AS DIRECTED     Jardiance 10 mg  Generic drug: empagliflozin  Take 1 tablet by mouth daily with breakfast.  Medication Adjustments for Surgery: Take last dose 3 days before surgery  Notes to patient: Last dose 11/18/24     latanoprost 0.005 % ophthalmic solution  Commonly known as: Xalatan  INSTILL ONE DROP INTO BOTH EYES ONE TIME DAILY AT BEDTIME  Medication Adjustments for Surgery: Take/Use as prescribed     metFORMIN  mg 24 hr tablet  Commonly known as: Glucophage-XR  Take 1 tablet by mouth two times a day with meals.  Medication Adjustments for Surgery: Do Not take on the morning of surgery     OneTouch Delica Plus Lancet 30 gauge misc  Generic drug: lancets  Use 1 strip 2 times a day.     * OneTouch Verio test strips strip  Generic drug: blood sugar diagnostic     * blood sugar diagnostic strip  Commonly known as: OneTouch  "Verio test strips  USE EVERY 12 HOURS AS DIRECTED     Sure Comfort Pen Needle 32 gauge x 5/32\" needle  Generic drug: pen needle, diabetic  Use every day with Levemir     timolol 0.5 % ophthalmic solution  Commonly known as: Timoptic  USE 1 DROP IN BOTH EYES TWICE DAILY.  Medication Adjustments for Surgery: Take/Use as prescribed     Trulicity 4.5 mg/0.5 mL pen injector  Generic drug: dulaglutide  Inject 4.5 mg subcutaneously one time a week.  Additional Medication Adjustments for Surgery: Take last dose 7 days before surgery  Notes to patient: Last dose 11/14/24           * This list has 2 medication(s) that are the same as other medications prescribed for you. Read the directions carefully, and ask your doctor or other care provider to review them with you.                            **Concerning above medication instructions, if medication is normally taken at night, continue normal schedule.**  **DO NOT TAKE NIGHT PRIOR AND MORNING OF SURGERY**    CONTACT SURGEON'S OFFICE IF YOU DEVELOP:  * Fever = 100.4 F   * New respiratory symptoms (e.g. cough, shortness of breath, respiratory distress, sore throat)  * Recent loss of taste or smell  *Flu like symptoms such as headache, fatigue or gastrointestinal symptoms  * You develop any open sores, shingles, burning or painful urination   AND/OR:  * You no longer wish to have the surgery.  * Any other personal circumstances change that may lead to the need to cancel or defer this surgery.  *You were admitted to any hospital within one week of your planned procedure.    SMOKING:  *Quitting smoking can make a huge difference to your health and recovery from surgery.    *If you need help with quitting, call 0-800-QUIT-NOW.    THE DAY OF SURGERY:  *Do not eat any food after midnight the night before surgery.   *You must drink 13.5 ounces of clear liquids (i.e. water, black coffee (no milk or cream), tea, apple juice or electrolyte drinks (gatorade)) 2 hours before your arrival " time.  *You may chew gum until 2 hours before your surgery    SURGICAL TIME  *You will be contacted between 2 p.m. and 6 p.m. the business day before your surgery with your arrival time.  *If you haven't received a call by 6pm, call [phone].  *Scheduled surgery times may change and you will be notified if this occurs-check your personal voicemail for any updates.    ON THE MORNING OF SURGERY:  *Wear comfortable, loose fitting clothing.   *Do not use moisturizers, creams, lotions or perfume.  *All jewelry and valuables should be left at home.  *Prosthetic devices such as contact lenses, hearing aids, dentures, eyelash extensions, hairpins and body piercing must be removed before surgery.    BRING WITH YOU:  *Photo ID and insurance card  *Current list of medicines and allergies  *Pacemaker/Defibrillator/Heart stent cards  *CPAP machine and mask  *Slings/splints/crutches  *Copy of your complete Advanced Directive/DHPOA-if applicable  *Neurostimulator implant remote    PARKING AND ARRIVAL:  *Check in at the Main Entrance desk and let them know you are here for surgery.  *You will be directed to the 2nd floor surgical waiting area.    AFTER OUTPATIENT SURGERY:  *A responsible adult MUST accompany you at the time of discharge and stay with you for 24 hours after your surgery.  *You may NOT drive yourself home after surgery.  *You may use a taxi or ride sharing service ([employer], Uber) to return home ONLY if you are accompanied by a friend or family member.  *Instructions for resuming your medications will be provided by your surgeon.         Patient Information: Oral/Dental Rinse  **This is a prescription; pick it up at your preferred local pharmacy **  What is oral/dental rinse?   It is a mouthwash. It is a way of cleaning the mouth with a germ killing solution before your surgery.  The solution contains chlorhexidine, commonly known as CHG.   It is used inside the mouth to kill a bacteria known as Staphylococcus  aureus.  Let your doctor know if you are allergic to Chlorhexidine.    Why do I need to use CHG oral/dental rinse?  The CHG oral/dental rinse helps to kill a bacteria in your mouth known a Staphylococcus aureus.     This reduces the risk of infection at the surgical site.      Using your CHG oral/dental rinse  STEPS:  Use your CHG oral/dental rinse after you brush your teeth the night before (at bedtime) and the morning of your surgery.  Follow all directions on your prescription label.    Use the cap on the container to measure 15ml (fill cap to fill line)  Swish (gargle if you can) the mouthwash in your mouth for at least 30 seconds, (do not to swallow) spit out  After you use your CHG rinse, do not rinse your mouth with water, drink or eat.  Please refer to prescription label for the appropriate time to resume oral intake  Dental rinse comes in one size bottle: 473ml ~16oz.  You will have leftover    rinse, discard after this use.    What side effects might I have using the CHG oral/dental rinse?  CHG rinse will stick to plaque on the teeth.  Brush and floss just before use.  Teeth brushing will help avoid staining of plaque during use.    Who should I contact if I have questions about the CHG oral/dental rinse?  Please call [facility], Preadmission Testing at [phone] if you have any questions    Home Preoperative Antibacterial Shower     What is a home preoperative antibacterial shower?  This shower is a way of cleaning the skin with a germ killing soap before surgery.  The soap contains chlorhexidine, commonly known as CHG.  CHG is a soap for your skin with germ killing ability.  Let your doctor know if you are allergic to chlorhexidine.    Why do I need to take a preoperative antibacterial shower?  Skin is not sterile.  It is best to try to make your skin as free of germs as possible before surgery.  Proper cleansing with a germ killing soap before surgery can lower the  number of germs on your skin.  This helps to reduce the risk of infection at the surgical site.  Following the instructions listed below will help you prepare your skin for surgery.      How do I use the CHG skin cleanser?  Steps:  Begin using your CHG soap five days before your scheduled surgery on ________________________.    Days 1-4 Shower before bed:  Wash your face and genitals with your normal soap and rinse.    Wash and rinse your hair using the CHG soap. Rinse completely, do not condition your   Hair.          3.    Apply the CHG soap to a clean wet washcloth.  Turn the water off or move away                From the water spray to avoid premature rinsing of the CHG soap as you are applying.     4.   Lather your entire body from the neck down.  Do not use on your face or genitals.   Pay special attention to the area(s) where your incision(s) will be located unless they are on your face.  Avoid scrubbing your skin too hard.  The important point is to have the CHG soap sit on your skin for 3 minutes.    When the 3 minutes are up, turn on the water and rinse the CHG soap off your body completely.   Pat yourself dry with a clean, freshly-laundered towel.  Dress in clean, freshly laundered night clothes.    Be sure to sleep with clean, freshly laundered sheets.  Day 5:  Last shower is the morning before surgery: Follow above Instructions.    NOTE:    *Hair extensions should be removed    *Keep CHG soap out of eyes and ear canals   *DO NOT wash with regular soap on your body after you have used the CHG        soap solution  *DO NOT apply powders, lotions, or perfume.  *Deodorant may be used days 1-4, BUT NOT the day of surgery    Who should I contact if I have any questions regarding the use of CHG soap?  Call the [facility], Preadmission Testing at [phone] if you have any questions.              Patient Information: Pre-Operative Infection Prevention Measures     Why did I have  my nose, under my arms and groin swabbed?  The purpose of the swab is to identify Staphylococcus aureus inside your nose or on your skin.  The swab was sent to the laboratory for culture.  A positive swab/culture for Staphylococcus aureus is called colonization or carriage.      What is Staphylococcus aureus?  Staphylococcus aureus, also known as “staph”, is a germ found on the skin or in the nose of healthy people.  Sometimes Staphylococcus aureus can get into the body and cause an infection.  This can be minor (such as pimples, boils or other skin problems).  It might also be serious (such as blood infection, pneumonia or a surgical site infection).    What is Staphylococcus aureus colonization or carriage?  Colonization or carriage means that a person has the germ but is not sick from it.  These bacteria can be spread on the hands or when breathing or sneezing.    How is Staphylococcus aureus spread?  It is most often spread by close contact with a person or item that carries it.    What happens if my culture is positive for Staphylococcus aureus?  Your doctor/medical team will use this information to guide any antibiotic treatment which may be necessary.  Regardless of the culture results, we will clean the inside of your nose with a betadine swab just before you have your surgery.      Will I get an infection if I have Staphylococcus aureus in my nose or on my skin?  Anyone can get an infection with Staphylococcus aureus.  However, the best way to reduce your risk of infection is to follow the instructions provided to you for the use of your CHG soap and dental rinse.        Who should I contact if I have any questions?  Call the [facility], Preadmission Testing at [phone] if you have any questions.

## 2024-11-15 NOTE — CPM/PAT NURSE NOTE
CPM/PAT Nurse Note      Name: Sarah Zavalal (Sarah Elliott)  /Age: 1990/34 y.o.       Past Medical History:   Diagnosis Date    Abnormal uterine bleeding (AUB)     Plan: Laparoscopic Excision of Endometriosis; Bilateral Chromopertubation;   Possible Bilateral Ovarian Cystectomy;   Possible Left Salpingectomy;   Hysteroscopy 24    Bell's palsy     Chondromalacia, patella, right     s/p KNEE SCOPE,MENISECTOMY,MED OR LAT    KNEE SCOPE,SHAVE ARTICULAR CART    ARTHRS KNEE W LAT RELEASE    ARTHROSCOPY KNEE MENISCECTOMY MEDIAL OR LATERAL    CHONDROPLASTY KNEE    ARTHROSCOPY KNEE, SURGICAL, LATERAL RELEASE   19    Chronic pelvic pain in female     Endometrioma     Endometriosis     Femoral hernia of right side     Glaucoma     Hydrosalpinx     Hyperlipidemia     Obesity     Type 2 diabetes mellitus     24 7.7%    Vitamin D deficiency        Past Surgical History:   Procedure Laterality Date    KNEE Left     knee scope    MENISCECTOMY Right 2019    KNEE SCOPE,MENISECTOMY,MED OR LAT    KNEE SCOPE,SHAVE ARTICULAR CART    ARTHRS KNEE W LAT RELEASE    ARTHROSCOPY KNEE MENISCECTOMY MEDIAL OR LATERAL    CHONDROPLASTY KNEE    ARTHROSCOPY KNEE, SURGICAL, LATERAL RELEASE       Patient  reports being sexually active.    Family History   Problem Relation Name Age of Onset    No Known Problems Mother      No Known Problems Father      Lung cancer Other grand mother        Allergies   Allergen Reactions    Morphine GI Upset and Nausea/vomiting       Prior to Admission medications    Medication Sig Start Date End Date Taking? Authorizing Provider   brimonidine (AlphaGAN) 0.2 % ophthalmic solution INSTILL 1 DROP IN BOTH EYES TWICE DAILY. 6/3/24 6/3/25 Yes Ivett Crawford MD   dulaglutide (Trulicity) 4.5 mg/0.5 mL pen injector Inject 4.5 mg subcutaneously one time a week.  Patient taking differently: Inject under the skin every 7 days. Monday 10/29/24  Yes    empagliflozin (Jardiance) 10 mg Take 1 tablet by  "mouth daily with breakfast. 10/29/24  Yes    latanoprost (Xalatan) 0.005 % ophthalmic solution INSTILL ONE DROP INTO BOTH EYES ONE TIME DAILY AT BEDTIME 6/3/24 6/3/25 Yes Ivett Crawford MD   metFORMIN  mg 24 hr tablet Take 1 tablet by mouth two times a day with meals. 10/29/24  Yes    atorvastatin (Lipitor) 10 mg tablet Take 1 tablet (10 mg) by mouth once daily.  Patient not taking: Reported on 11/12/2024 4/12/24 5/17/25  Ivett Crawford MD   blood sugar diagnostic (OneTouch Verio test strips) strip   5/27/18   Historical Provider, MD   blood sugar diagnostic (OneTouch Verio test strips) strip USE EVERY 12 HOURS AS DIRECTED 10/31/24 10/31/25     blood-glucose meter (OneTouch Verio Flex meter) misc Use as directed to check blood sugar 10/30/24      chlorhexidine (Peridex) 0.12 % solution Swish for 30 seconds and spit 15mL of solution the night before and morning of surgery 11/15/24   MIKAYLA Fontaine   doxycycline (Monodox) 100 mg capsule Take 1 capsule (100 mg) by mouth 2 times a day. Take with at least 8 ounces (large glass) of water, do not lie down for 30 minutes after. Start the day before HSG and then continue twice a day for 5 days total.  Patient not taking: Reported on 11/12/2024 6/27/24   MIKAYLA Miguel   flash glucose sensor kit (FreeStyle Ricky 14 Day Sensor) kit USE AS DIRECTED 10/30/24 10/30/25     lancets (OneTouch Delica Plus Lancet) 30 gauge misc Use 1 strip 2 times a day. 12/14/23 1/28/25  Ivett Crawford MD   pen needle, diabetic 32 gauge x 5/32\" needle Use every day with Levemir 12/22/23   Ivett Crawford MD   timolol (Timoptic) 0.5 % ophthalmic solution USE 1 DROP IN BOTH EYES TWICE DAILY. 8/21/23 9/20/24     dulaglutide (Trulicity) 3 mg/0.5 mL pen injector Inject 3 mg under the skin 1 (one) time per week.  Patient not taking: Reported on 11/12/2024 10/20/24 11/13/24  Ivett Crawford MD   ergocalciferol (Vitamin D-2) 1.25 MG (86325 UT) capsule Take 1 capsule (1,250 mcg) by mouth 1 (one) " time per week.  Patient not taking: Reported on 11/12/2024 4/20/22 11/13/24  Historical Provider, MD   metFORMIN (Glucophage) 1,000 mg tablet Take 1 tablet (1,000 mg) by mouth 2 times daily (morning and late afternoon).  Patient not taking: Reported on 11/12/2024 9/3/24 11/13/24  Ivett Crawford MD   prenatal vit,calc78-iron-folic (Prenatabs FA) 29-1 mg tablet Take 1 tablet by mouth once daily.  Patient not taking: Reported on 11/12/2024 7/9/24 11/13/24  Gracia Vann MD   sertraline (Zoloft) 25 mg tablet Take 1 tablet (25 mg) by mouth once daily.  Patient not taking: Reported on 11/12/2024 11/13/24  Historical Provider, MD LANCE NICOLAS     DASI Risk Score    No data to display       Caprini DVT Assessment    No data to display       Modified Frailty Index    No data to display       CHADS2 Stroke Risk  Current as of 28 minutes ago        N/A 3 to 100%: High Risk   2 to < 3%: Medium Risk   0 to < 2%: Low Risk     Last Change: N/A          This score determines the patient's risk of having a stroke if the patient has atrial fibrillation.        This score is not applicable to this patient. Components are not calculated.          Revised Cardiac Risk Index    No data to display       Apfel Simplified Score    No data to display       Risk Analysis Index Results This Encounter    No data found in the last 10 encounters.       Prodigy: High Risk  Total Score: 0          ARISCAT Score for Postoperative Pulmonary Complications    No data to display       Bangura Perioperative Risk for Myocardial Infarction or Cardiac Arrest (GEETHA)    No data to display         Nurse Plan of Action:   After Visit Summary (AVS) reviewed and patient verbalized good understanding of medications and NPO instructions.  Pre-op infection prevention measures:  CHG showers and mouthwash reviewed, understanding voiced.  CHG soap given and patient verbalized need to pick CHG mouthwash at their preferred local pharmacy.

## 2024-11-17 LAB — STAPHYLOCOCCUS SPEC CULT: NORMAL

## 2024-11-19 NOTE — HOSPITAL COURSE
Sarah Gallagher is a 34 y.o.  with endometriosis relapse and left sided hydrosalpinx  presenting for diagnostic hysteroscopy, laparoscopic lysis of adhesion/possible L salpingectomy, excision of endometriosis, bilateral chromopertubation, any indicated procedures and inguinal hernia repair with Dr. Bui    PMH: T2DM, glaucoma  PSH: laparoscopy - R ovarian cystectomy (endometrioma) in   Imaging: HSG 24  IMPRESSION:  1. Correlate with real time fluoroscopic findings at the time of  procedure.  2. Bilateral tubal disease with obstructed hydrosalpinx on left and  dilated tube with small amount of spill on the right, loculations  present. Filling defects suspected within the uterus.    Lab Results   Component Value Date    WBC 6.1 11/15/2024    HGB 14.3 11/15/2024    HCT 43.3 11/15/2024    MCV 81 11/15/2024     11/15/2024       Lab Results   Component Value Date    GLUCOSE 98 11/15/2024    CALCIUM 9.1 11/15/2024     11/15/2024    K 4.4 11/15/2024    CO2 25 11/15/2024     11/15/2024    BUN 13 11/15/2024    CREATININE 0.68 11/15/2024

## 2024-11-21 ENCOUNTER — ANESTHESIA EVENT (OUTPATIENT)
Dept: OPERATING ROOM | Facility: HOSPITAL | Age: 34
End: 2024-11-21
Payer: COMMERCIAL

## 2024-11-22 ENCOUNTER — HOSPITAL ENCOUNTER (OUTPATIENT)
Facility: HOSPITAL | Age: 34
Discharge: HOME | End: 2024-11-24
Attending: OBSTETRICS & GYNECOLOGY | Admitting: OBSTETRICS & GYNECOLOGY
Payer: COMMERCIAL

## 2024-11-22 ENCOUNTER — ANESTHESIA (OUTPATIENT)
Dept: OPERATING ROOM | Facility: HOSPITAL | Age: 34
End: 2024-11-22
Payer: COMMERCIAL

## 2024-11-22 DIAGNOSIS — G89.18 POST-OP PAIN: ICD-10-CM

## 2024-11-22 DIAGNOSIS — G89.29 CHRONIC PELVIC PAIN IN FEMALE: ICD-10-CM

## 2024-11-22 DIAGNOSIS — N93.9 ABNORMAL UTERINE BLEEDING (AUB): ICD-10-CM

## 2024-11-22 DIAGNOSIS — N80.129 ENDOMETRIOMA: ICD-10-CM

## 2024-11-22 DIAGNOSIS — R10.2 CHRONIC PELVIC PAIN IN FEMALE: ICD-10-CM

## 2024-11-22 DIAGNOSIS — N80.9 ENDOMETRIOSIS: ICD-10-CM

## 2024-11-22 DIAGNOSIS — N70.11 HYDROSALPINX: ICD-10-CM

## 2024-11-22 DIAGNOSIS — K41.90 FEMORAL HERNIA OF RIGHT SIDE: Primary | ICD-10-CM

## 2024-11-22 PROBLEM — J45.909 ASTHMA: Status: ACTIVE | Noted: 2024-11-22

## 2024-11-22 PROBLEM — Z98.890 POSTOPERATIVE STATE: Status: ACTIVE | Noted: 2024-11-22

## 2024-11-22 LAB
ABO GROUP (TYPE) IN BLOOD: NORMAL
ANTIBODY SCREEN: NORMAL
GLUCOSE BLD MANUAL STRIP-MCNC: 139 MG/DL (ref 74–99)
GLUCOSE BLD MANUAL STRIP-MCNC: 208 MG/DL (ref 74–99)
PREGNANCY TEST URINE, POC: NEGATIVE
RH FACTOR (ANTIGEN D): NORMAL

## 2024-11-22 PROCEDURE — 2500000001 HC RX 250 WO HCPCS SELF ADMINISTERED DRUGS (ALT 637 FOR MEDICARE OP): Performed by: STUDENT IN AN ORGANIZED HEALTH CARE EDUCATION/TRAINING PROGRAM

## 2024-11-22 PROCEDURE — 2500000004 HC RX 250 GENERAL PHARMACY W/ HCPCS (ALT 636 FOR OP/ED): Mod: JZ | Performed by: OBSTETRICS & GYNECOLOGY

## 2024-11-22 PROCEDURE — 3600000003 HC OR TIME - INITIAL BASE CHARGE - PROCEDURE LEVEL THREE: Performed by: OBSTETRICS & GYNECOLOGY

## 2024-11-22 PROCEDURE — 3600000008 HC OR TIME - EACH INCREMENTAL 1 MINUTE - PROCEDURE LEVEL THREE: Performed by: OBSTETRICS & GYNECOLOGY

## 2024-11-22 PROCEDURE — 2780000003 HC OR 278 NO HCPCS: Performed by: OBSTETRICS & GYNECOLOGY

## 2024-11-22 PROCEDURE — 2500000005 HC RX 250 GENERAL PHARMACY W/O HCPCS: Performed by: ANESTHESIOLOGIST ASSISTANT

## 2024-11-22 PROCEDURE — 2500000001 HC RX 250 WO HCPCS SELF ADMINISTERED DRUGS (ALT 637 FOR MEDICARE OP): Performed by: ANESTHESIOLOGY

## 2024-11-22 PROCEDURE — 82947 ASSAY GLUCOSE BLOOD QUANT: CPT | Mod: 91

## 2024-11-22 PROCEDURE — 88305 TISSUE EXAM BY PATHOLOGIST: CPT | Mod: TC,AHULAB | Performed by: OBSTETRICS & GYNECOLOGY

## 2024-11-22 PROCEDURE — P9045 ALBUMIN (HUMAN), 5%, 250 ML: HCPCS | Mod: JZ | Performed by: ANESTHESIOLOGY

## 2024-11-22 PROCEDURE — 3700000001 HC GENERAL ANESTHESIA TIME - INITIAL BASE CHARGE: Performed by: OBSTETRICS & GYNECOLOGY

## 2024-11-22 PROCEDURE — 86900 BLOOD TYPING SEROLOGIC ABO: CPT | Performed by: OBSTETRICS & GYNECOLOGY

## 2024-11-22 PROCEDURE — 3700000002 HC GENERAL ANESTHESIA TIME - EACH INCREMENTAL 1 MINUTE: Performed by: OBSTETRICS & GYNECOLOGY

## 2024-11-22 PROCEDURE — G0378 HOSPITAL OBSERVATION PER HR: HCPCS

## 2024-11-22 PROCEDURE — 2500000001 HC RX 250 WO HCPCS SELF ADMINISTERED DRUGS (ALT 637 FOR MEDICARE OP): Performed by: OBSTETRICS & GYNECOLOGY

## 2024-11-22 PROCEDURE — 88305 TISSUE EXAM BY PATHOLOGIST: CPT | Performed by: STUDENT IN AN ORGANIZED HEALTH CARE EDUCATION/TRAINING PROGRAM

## 2024-11-22 PROCEDURE — 36415 COLL VENOUS BLD VENIPUNCTURE: CPT | Performed by: OBSTETRICS & GYNECOLOGY

## 2024-11-22 PROCEDURE — 7100000002 HC RECOVERY ROOM TIME - EACH INCREMENTAL 1 MINUTE: Performed by: OBSTETRICS & GYNECOLOGY

## 2024-11-22 PROCEDURE — 2500000004 HC RX 250 GENERAL PHARMACY W/ HCPCS (ALT 636 FOR OP/ED): Performed by: ANESTHESIOLOGIST ASSISTANT

## 2024-11-22 PROCEDURE — 2500000005 HC RX 250 GENERAL PHARMACY W/O HCPCS: Performed by: OBSTETRICS & GYNECOLOGY

## 2024-11-22 PROCEDURE — 7100000001 HC RECOVERY ROOM TIME - INITIAL BASE CHARGE: Performed by: OBSTETRICS & GYNECOLOGY

## 2024-11-22 PROCEDURE — 2500000004 HC RX 250 GENERAL PHARMACY W/ HCPCS (ALT 636 FOR OP/ED): Performed by: ANESTHESIOLOGY

## 2024-11-22 PROCEDURE — 2720000007 HC OR 272 NO HCPCS: Performed by: OBSTETRICS & GYNECOLOGY

## 2024-11-22 DEVICE — GRAFT, CLARIX CORD 4.0 X 3.0: Type: IMPLANTABLE DEVICE | Site: PELVIS | Status: FUNCTIONAL

## 2024-11-22 RX ORDER — CEFAZOLIN SODIUM 2 G/100ML
2 INJECTION, SOLUTION INTRAVENOUS ONCE
Status: DISCONTINUED | OUTPATIENT
Start: 2024-11-22 | End: 2024-11-22 | Stop reason: HOSPADM

## 2024-11-22 RX ORDER — BUPIVACAINE HYDROCHLORIDE 2.5 MG/ML
INJECTION, SOLUTION INFILTRATION; PERINEURAL AS NEEDED
Status: DISCONTINUED | OUTPATIENT
Start: 2024-11-22 | End: 2024-11-22 | Stop reason: HOSPADM

## 2024-11-22 RX ORDER — ACETAMINOPHEN 325 MG/1
975 TABLET ORAL ONCE
Status: COMPLETED | OUTPATIENT
Start: 2024-11-22 | End: 2024-11-22

## 2024-11-22 RX ORDER — KETOROLAC TROMETHAMINE 30 MG/ML
INJECTION, SOLUTION INTRAMUSCULAR; INTRAVENOUS AS NEEDED
Status: DISCONTINUED | OUTPATIENT
Start: 2024-11-22 | End: 2024-11-22

## 2024-11-22 RX ORDER — OXYCODONE HYDROCHLORIDE 5 MG/1
5 TABLET ORAL EVERY 4 HOURS PRN
Status: DISCONTINUED | OUTPATIENT
Start: 2024-11-23 | End: 2024-11-24 | Stop reason: HOSPADM

## 2024-11-22 RX ORDER — ACETAMINOPHEN 325 MG/1
650 TABLET ORAL EVERY 6 HOURS
Status: DISCONTINUED | OUTPATIENT
Start: 2024-11-22 | End: 2024-11-24 | Stop reason: HOSPADM

## 2024-11-22 RX ORDER — POLYETHYLENE GLYCOL 3350 17 G/17G
17 POWDER, FOR SOLUTION ORAL DAILY PRN
Qty: 10 PACKET | Refills: 0 | Status: SHIPPED | OUTPATIENT
Start: 2024-11-22

## 2024-11-22 RX ORDER — HYDROMORPHONE HYDROCHLORIDE 1 MG/ML
0.2 INJECTION, SOLUTION INTRAMUSCULAR; INTRAVENOUS; SUBCUTANEOUS EVERY 2 HOUR PRN
Status: DISPENSED | OUTPATIENT
Start: 2024-11-22 | End: 2024-11-23

## 2024-11-22 RX ORDER — LIDOCAINE HYDROCHLORIDE 10 MG/ML
0.1 INJECTION, SOLUTION EPIDURAL; INFILTRATION; INTRACAUDAL; PERINEURAL ONCE
Status: DISCONTINUED | OUTPATIENT
Start: 2024-11-22 | End: 2024-11-22 | Stop reason: HOSPADM

## 2024-11-22 RX ORDER — IBUPROFEN 600 MG/1
600 TABLET ORAL EVERY 6 HOURS SCHEDULED
Status: DISCONTINUED | OUTPATIENT
Start: 2024-11-23 | End: 2024-11-24 | Stop reason: HOSPADM

## 2024-11-22 RX ORDER — ONDANSETRON 4 MG/1
4 TABLET, FILM COATED ORAL EVERY 6 HOURS PRN
Status: DISCONTINUED | OUTPATIENT
Start: 2024-11-22 | End: 2024-11-24 | Stop reason: HOSPADM

## 2024-11-22 RX ORDER — ACETAMINOPHEN 325 MG/1
650 TABLET ORAL EVERY 6 HOURS PRN
Qty: 20 TABLET | Refills: 0 | Status: SHIPPED | OUTPATIENT
Start: 2024-11-22 | End: 2024-12-09 | Stop reason: ALTCHOICE

## 2024-11-22 RX ORDER — ONDANSETRON 4 MG/1
4 TABLET, FILM COATED ORAL EVERY 6 HOURS PRN
Qty: 20 TABLET | Refills: 0 | Status: SHIPPED | OUTPATIENT
Start: 2024-11-22 | End: 2024-12-09 | Stop reason: ALTCHOICE

## 2024-11-22 RX ORDER — CELECOXIB 200 MG/1
400 CAPSULE ORAL ONCE
Status: COMPLETED | OUTPATIENT
Start: 2024-11-22 | End: 2024-11-22

## 2024-11-22 RX ORDER — SIMETHICONE 80 MG
80 TABLET,CHEWABLE ORAL 4 TIMES DAILY PRN
Status: DISCONTINUED | OUTPATIENT
Start: 2024-11-22 | End: 2024-11-24 | Stop reason: HOSPADM

## 2024-11-22 RX ORDER — DOXYCYCLINE 100 MG/10ML
INJECTION, POWDER, LYOPHILIZED, FOR SOLUTION INTRAVENOUS AS NEEDED
Status: DISCONTINUED | OUTPATIENT
Start: 2024-11-22 | End: 2024-11-22

## 2024-11-22 RX ORDER — GLYCOPYRROLATE 0.2 MG/ML
INJECTION INTRAMUSCULAR; INTRAVENOUS AS NEEDED
Status: DISCONTINUED | OUTPATIENT
Start: 2024-11-22 | End: 2024-11-22

## 2024-11-22 RX ORDER — ONDANSETRON HYDROCHLORIDE 2 MG/ML
4 INJECTION, SOLUTION INTRAVENOUS EVERY 6 HOURS PRN
Status: DISCONTINUED | OUTPATIENT
Start: 2024-11-22 | End: 2024-11-24 | Stop reason: HOSPADM

## 2024-11-22 RX ORDER — FENTANYL CITRATE 50 UG/ML
INJECTION, SOLUTION INTRAMUSCULAR; INTRAVENOUS AS NEEDED
Status: DISCONTINUED | OUTPATIENT
Start: 2024-11-22 | End: 2024-11-22

## 2024-11-22 RX ORDER — ROCURONIUM BROMIDE 10 MG/ML
INJECTION, SOLUTION INTRAVENOUS AS NEEDED
Status: DISCONTINUED | OUTPATIENT
Start: 2024-11-22 | End: 2024-11-22

## 2024-11-22 RX ORDER — OXYCODONE HYDROCHLORIDE 5 MG/1
5 TABLET ORAL EVERY 4 HOURS PRN
Status: DISCONTINUED | OUTPATIENT
Start: 2024-11-22 | End: 2024-11-22 | Stop reason: HOSPADM

## 2024-11-22 RX ORDER — GABAPENTIN 300 MG/1
600 CAPSULE ORAL ONCE
Status: COMPLETED | OUTPATIENT
Start: 2024-11-22 | End: 2024-11-22

## 2024-11-22 RX ORDER — METOCLOPRAMIDE HYDROCHLORIDE 5 MG/ML
10 INJECTION INTRAMUSCULAR; INTRAVENOUS EVERY 6 HOURS PRN
Status: DISCONTINUED | OUTPATIENT
Start: 2024-11-22 | End: 2024-11-24 | Stop reason: HOSPADM

## 2024-11-22 RX ORDER — MEPERIDINE HYDROCHLORIDE 25 MG/ML
12.5 INJECTION INTRAMUSCULAR; INTRAVENOUS; SUBCUTANEOUS EVERY 10 MIN PRN
Status: DISCONTINUED | OUTPATIENT
Start: 2024-11-22 | End: 2024-11-22 | Stop reason: HOSPADM

## 2024-11-22 RX ORDER — ONDANSETRON HYDROCHLORIDE 2 MG/ML
4 INJECTION, SOLUTION INTRAVENOUS ONCE AS NEEDED
Status: COMPLETED | OUTPATIENT
Start: 2024-11-22 | End: 2024-11-22

## 2024-11-22 RX ORDER — HYDROMORPHONE HYDROCHLORIDE 1 MG/ML
INJECTION, SOLUTION INTRAMUSCULAR; INTRAVENOUS; SUBCUTANEOUS AS NEEDED
Status: DISCONTINUED | OUTPATIENT
Start: 2024-11-22 | End: 2024-11-22

## 2024-11-22 RX ORDER — METOCLOPRAMIDE 10 MG/1
10 TABLET ORAL EVERY 6 HOURS PRN
Status: DISCONTINUED | OUTPATIENT
Start: 2024-11-22 | End: 2024-11-24 | Stop reason: HOSPADM

## 2024-11-22 RX ORDER — DOCUSATE SODIUM 100 MG/1
100 CAPSULE, LIQUID FILLED ORAL 2 TIMES DAILY
Status: DISCONTINUED | OUTPATIENT
Start: 2024-11-22 | End: 2024-11-24 | Stop reason: HOSPADM

## 2024-11-22 RX ORDER — MIDAZOLAM HYDROCHLORIDE 1 MG/ML
INJECTION INTRAMUSCULAR; INTRAVENOUS AS NEEDED
Status: DISCONTINUED | OUTPATIENT
Start: 2024-11-22 | End: 2024-11-22

## 2024-11-22 RX ORDER — ONDANSETRON HYDROCHLORIDE 2 MG/ML
INJECTION, SOLUTION INTRAVENOUS AS NEEDED
Status: DISCONTINUED | OUTPATIENT
Start: 2024-11-22 | End: 2024-11-22

## 2024-11-22 RX ORDER — ALBUMIN HUMAN 50 G/1000ML
25 SOLUTION INTRAVENOUS ONCE
Status: COMPLETED | OUTPATIENT
Start: 2024-11-22 | End: 2024-11-22

## 2024-11-22 RX ORDER — PROPOFOL 10 MG/ML
INJECTION, EMULSION INTRAVENOUS AS NEEDED
Status: DISCONTINUED | OUTPATIENT
Start: 2024-11-22 | End: 2024-11-22

## 2024-11-22 RX ORDER — OXYCODONE HYDROCHLORIDE 5 MG/1
5 TABLET ORAL EVERY 6 HOURS PRN
Qty: 10 TABLET | Refills: 0 | Status: SHIPPED | OUTPATIENT
Start: 2024-11-22 | End: 2024-12-09 | Stop reason: ALTCHOICE

## 2024-11-22 RX ORDER — IBUPROFEN 600 MG/1
600 TABLET ORAL EVERY 6 HOURS PRN
Qty: 20 TABLET | Refills: 0 | Status: SHIPPED | OUTPATIENT
Start: 2024-11-22

## 2024-11-22 RX ORDER — LIDOCAINE HYDROCHLORIDE 20 MG/ML
INJECTION, SOLUTION INFILTRATION; PERINEURAL AS NEEDED
Status: DISCONTINUED | OUTPATIENT
Start: 2024-11-22 | End: 2024-11-22

## 2024-11-22 RX ORDER — CEFAZOLIN 1 G/1
INJECTION, POWDER, FOR SOLUTION INTRAVENOUS AS NEEDED
Status: DISCONTINUED | OUTPATIENT
Start: 2024-11-22 | End: 2024-11-22

## 2024-11-22 SDOH — SOCIAL STABILITY: SOCIAL INSECURITY
WITHIN THE LAST YEAR, HAVE YOU BEEN KICKED, HIT, SLAPPED, OR OTHERWISE PHYSICALLY HURT BY YOUR PARTNER OR EX-PARTNER?: NO

## 2024-11-22 SDOH — SOCIAL STABILITY: SOCIAL INSECURITY: WITHIN THE LAST YEAR, HAVE YOU BEEN HUMILIATED OR EMOTIONALLY ABUSED IN OTHER WAYS BY YOUR PARTNER OR EX-PARTNER?: NO

## 2024-11-22 SDOH — ECONOMIC STABILITY: HOUSING INSECURITY: AT ANY TIME IN THE PAST 12 MONTHS, WERE YOU HOMELESS OR LIVING IN A SHELTER (INCLUDING NOW)?: NO

## 2024-11-22 SDOH — ECONOMIC STABILITY: FOOD INSECURITY: HOW HARD IS IT FOR YOU TO PAY FOR THE VERY BASICS LIKE FOOD, HOUSING, MEDICAL CARE, AND HEATING?: NOT HARD AT ALL

## 2024-11-22 SDOH — SOCIAL STABILITY: SOCIAL INSECURITY: WERE YOU ABLE TO COMPLETE ALL THE BEHAVIORAL HEALTH SCREENINGS?: YES

## 2024-11-22 SDOH — SOCIAL STABILITY: SOCIAL INSECURITY: HAVE YOU HAD ANY THOUGHTS OF HARMING ANYONE ELSE?: NO

## 2024-11-22 SDOH — ECONOMIC STABILITY: HOUSING INSECURITY: IN THE PAST 12 MONTHS, HOW MANY TIMES HAVE YOU MOVED WHERE YOU WERE LIVING?: 0

## 2024-11-22 SDOH — ECONOMIC STABILITY: TRANSPORTATION INSECURITY: IN THE PAST 12 MONTHS, HAS LACK OF TRANSPORTATION KEPT YOU FROM MEDICAL APPOINTMENTS OR FROM GETTING MEDICATIONS?: NO

## 2024-11-22 SDOH — SOCIAL STABILITY: SOCIAL INSECURITY: WITHIN THE LAST YEAR, HAVE YOU BEEN AFRAID OF YOUR PARTNER OR EX-PARTNER?: NO

## 2024-11-22 SDOH — ECONOMIC STABILITY: FOOD INSECURITY: WITHIN THE PAST 12 MONTHS, YOU WORRIED THAT YOUR FOOD WOULD RUN OUT BEFORE YOU GOT THE MONEY TO BUY MORE.: NEVER TRUE

## 2024-11-22 SDOH — SOCIAL STABILITY: SOCIAL INSECURITY: ABUSE: ADULT

## 2024-11-22 SDOH — ECONOMIC STABILITY: INCOME INSECURITY: IN THE PAST 12 MONTHS HAS THE ELECTRIC, GAS, OIL, OR WATER COMPANY THREATENED TO SHUT OFF SERVICES IN YOUR HOME?: NO

## 2024-11-22 SDOH — SOCIAL STABILITY: SOCIAL INSECURITY: HAVE YOU HAD THOUGHTS OF HARMING ANYONE ELSE?: NO

## 2024-11-22 SDOH — ECONOMIC STABILITY: HOUSING INSECURITY: IN THE LAST 12 MONTHS, WAS THERE A TIME WHEN YOU WERE NOT ABLE TO PAY THE MORTGAGE OR RENT ON TIME?: NO

## 2024-11-22 SDOH — SOCIAL STABILITY: SOCIAL INSECURITY: DOES ANYONE TRY TO KEEP YOU FROM HAVING/CONTACTING OTHER FRIENDS OR DOING THINGS OUTSIDE YOUR HOME?: NO

## 2024-11-22 SDOH — SOCIAL STABILITY: SOCIAL INSECURITY: DO YOU FEEL UNSAFE GOING BACK TO THE PLACE WHERE YOU ARE LIVING?: NO

## 2024-11-22 SDOH — SOCIAL STABILITY: SOCIAL INSECURITY
WITHIN THE LAST YEAR, HAVE YOU BEEN RAPED OR FORCED TO HAVE ANY KIND OF SEXUAL ACTIVITY BY YOUR PARTNER OR EX-PARTNER?: NO

## 2024-11-22 SDOH — ECONOMIC STABILITY: FOOD INSECURITY: WITHIN THE PAST 12 MONTHS, THE FOOD YOU BOUGHT JUST DIDN'T LAST AND YOU DIDN'T HAVE MONEY TO GET MORE.: NEVER TRUE

## 2024-11-22 SDOH — SOCIAL STABILITY: SOCIAL INSECURITY: ARE THERE ANY APPARENT SIGNS OF INJURIES/BEHAVIORS THAT COULD BE RELATED TO ABUSE/NEGLECT?: NO

## 2024-11-22 SDOH — SOCIAL STABILITY: SOCIAL INSECURITY: ARE YOU OR HAVE YOU BEEN THREATENED OR ABUSED PHYSICALLY, EMOTIONALLY, OR SEXUALLY BY ANYONE?: NO

## 2024-11-22 SDOH — SOCIAL STABILITY: SOCIAL INSECURITY: HAS ANYONE EVER THREATENED TO HURT YOUR FAMILY OR YOUR PETS?: NO

## 2024-11-22 SDOH — SOCIAL STABILITY: SOCIAL INSECURITY: DO YOU FEEL ANYONE HAS EXPLOITED OR TAKEN ADVANTAGE OF YOU FINANCIALLY OR OF YOUR PERSONAL PROPERTY?: NO

## 2024-11-22 ASSESSMENT — PAIN SCALES - GENERAL
PAINLEVEL_OUTOF10: 10 - WORST POSSIBLE PAIN
PAINLEVEL_OUTOF10: 0 - NO PAIN
PAINLEVEL_OUTOF10: 7
PAIN_LEVEL: 0
PAINLEVEL_OUTOF10: 5 - MODERATE PAIN
PAINLEVEL_OUTOF10: 7
PAINLEVEL_OUTOF10: 10 - WORST POSSIBLE PAIN
PAINLEVEL_OUTOF10: 5 - MODERATE PAIN
PAINLEVEL_OUTOF10: 10 - WORST POSSIBLE PAIN
PAINLEVEL_OUTOF10: 0 - NO PAIN

## 2024-11-22 ASSESSMENT — ACTIVITIES OF DAILY LIVING (ADL)
TOILETING: INDEPENDENT
GROOMING: INDEPENDENT
JUDGMENT_ADEQUATE_SAFELY_COMPLETE_DAILY_ACTIVITIES: YES
LACK_OF_TRANSPORTATION: NO
ADEQUATE_TO_COMPLETE_ADL: YES
DRESSING YOURSELF: INDEPENDENT
HEARING - RIGHT EAR: FUNCTIONAL
HEARING - LEFT EAR: FUNCTIONAL
FEEDING YOURSELF: INDEPENDENT
ASSISTIVE_DEVICE: EYEGLASSES
PATIENT'S MEMORY ADEQUATE TO SAFELY COMPLETE DAILY ACTIVITIES?: YES
LACK_OF_TRANSPORTATION: NO
BATHING: INDEPENDENT
WALKS IN HOME: INDEPENDENT

## 2024-11-22 ASSESSMENT — PAIN - FUNCTIONAL ASSESSMENT
PAIN_FUNCTIONAL_ASSESSMENT: UNABLE TO SELF-REPORT
PAIN_FUNCTIONAL_ASSESSMENT: 0-10
PAIN_FUNCTIONAL_ASSESSMENT: UNABLE TO SELF-REPORT
PAIN_FUNCTIONAL_ASSESSMENT: UNABLE TO SELF-REPORT
PAIN_FUNCTIONAL_ASSESSMENT: 0-10
PAIN_FUNCTIONAL_ASSESSMENT: UNABLE TO SELF-REPORT
PAIN_FUNCTIONAL_ASSESSMENT: 0-10
PAIN_FUNCTIONAL_ASSESSMENT: 0-10
PAIN_FUNCTIONAL_ASSESSMENT: UNABLE TO SELF-REPORT
PAIN_FUNCTIONAL_ASSESSMENT: UNABLE TO SELF-REPORT
PAIN_FUNCTIONAL_ASSESSMENT: 0-10

## 2024-11-22 ASSESSMENT — COGNITIVE AND FUNCTIONAL STATUS - GENERAL
TOILETING: A LITTLE
DAILY ACTIVITIY SCORE: 22
CLIMB 3 TO 5 STEPS WITH RAILING: A LITTLE
MOBILITY SCORE: 24
MOVING TO AND FROM BED TO CHAIR: A LITTLE
WALKING IN HOSPITAL ROOM: A LITTLE
DRESSING REGULAR LOWER BODY CLOTHING: A LITTLE
PATIENT BASELINE BEDBOUND: NO
DAILY ACTIVITIY SCORE: 24
MOBILITY SCORE: 21

## 2024-11-22 ASSESSMENT — LIFESTYLE VARIABLES
SKIP TO QUESTIONS 9-10: 1
HOW MANY STANDARD DRINKS CONTAINING ALCOHOL DO YOU HAVE ON A TYPICAL DAY: PATIENT DOES NOT DRINK
HOW OFTEN DO YOU HAVE 6 OR MORE DRINKS ON ONE OCCASION: NEVER
AUDIT-C TOTAL SCORE: 0
AUDIT-C TOTAL SCORE: 0
HOW OFTEN DO YOU HAVE A DRINK CONTAINING ALCOHOL: NEVER

## 2024-11-22 ASSESSMENT — COLUMBIA-SUICIDE SEVERITY RATING SCALE - C-SSRS
1. IN THE PAST MONTH, HAVE YOU WISHED YOU WERE DEAD OR WISHED YOU COULD GO TO SLEEP AND NOT WAKE UP?: NO
6. HAVE YOU EVER DONE ANYTHING, STARTED TO DO ANYTHING, OR PREPARED TO DO ANYTHING TO END YOUR LIFE?: NO
2. HAVE YOU ACTUALLY HAD ANY THOUGHTS OF KILLING YOURSELF?: NO

## 2024-11-22 ASSESSMENT — PATIENT HEALTH QUESTIONNAIRE - PHQ9
2. FEELING DOWN, DEPRESSED OR HOPELESS: NOT AT ALL
1. LITTLE INTEREST OR PLEASURE IN DOING THINGS: NOT AT ALL
SUM OF ALL RESPONSES TO PHQ9 QUESTIONS 1 & 2: 0

## 2024-11-22 NOTE — SIGNIFICANT EVENT
Intraoperative inspection after Dr. Hathaway had obtained intra-abdominal access laparoscopically revealed no clear evidence of an inguinal hernia.  Severe, extensive endometriosis noted.  She also had a couple of loops of bowel stuck down in her right groin region which could explain her symptoms.  Plans for hernia repair aborted.

## 2024-11-22 NOTE — PERIOPERATIVE NURSING NOTE
1649 Pt to bay 42 on sfm. Sfm removed and pt placed on room air. Bedside report given to this rn by or rn and aa.    1657 Pt  updated via text message.    1709 Ice pack removed and heat pack placed per pt request. Pt refuses anything to drink at this time.     1730 Pt  updated via phone call.

## 2024-11-22 NOTE — ANESTHESIA PREPROCEDURE EVALUATION
Patient: Sarah Gallagher    Procedure Information       Date/Time: 11/22/24 1200    Procedures:       Laparoscopic Excision of Endometriosis; Bilateral Chromopertubation (Pelvis)      Possible Bilateral Ovarian Cystectomy (Bilateral: Pelvis)      Possible Left Salpingectomy (Left: Pelvis)      Hysteroscopy (Pelvis)      Laparoscopic Right Inguinal Hernia Repair; Mesh Placement (Right)    Location: [facility] A OR 09 / Virtual [facility] A OR    Surgeons: Elsie Hathaway MD; Yazan Bui MD            Relevant Problems   Anesthesia (within normal limits)      Cardiac   (+) Chest pain   (+) Elevated cholesterol   (+) Mixed hyperlipidemia      Pulmonary   (+) Asthma      Neuro  Bell's palsy R 2018, resolved   (+) Anxiety attack   (+) Bell's palsy   (+) Ruelas's neuroma of left foot   (+) Tarsal tunnel syndrome      GI (within normal limits)      /Renal (within normal limits)      Liver (within normal limits)      Endocrine   (+) Pre-existing type 2 diabetes mellitus during pregnancy in second trimester (HHS-HCC)   (+) Type 2 diabetes mellitus with hyperglycemia, without long-term current use of insulin   (+) Type 2 diabetes mellitus without complications (Multi)      Hematology (within normal limits)      HEENT   (+) Glaucoma      Skin   (+) Rash      GYN   (+) Abnormal uterine bleeding (AUB)   (+) Endometrioma   (+) Endometriosis       Clinical information reviewed:   Tobacco  Allergies  Meds   Med Hx  Surg Hx   Fam Hx  Soc Hx        NPO Detail:  NPO/Void Status  Date of Last Liquid: 11/22/24  Time of Last Liquid: 0200  Date of Last Solid: 11/21/24  Time of Last Solid: 2100         Physical Exam    Airway  Mallampati: II     Cardiovascular    Dental    Pulmonary    Abdominal            Anesthesia Plan    History of general anesthesia?: yes  History of complications of general anesthesia?: no    ASA 2     general     intravenous induction   Anesthetic plan and risks discussed with patient.

## 2024-11-22 NOTE — H&P
[facility] History and Physical    Subjective   Sarah Gallagher is a 34 y.o.  with endometriosis relapse and left sided hydrosalpinx  presenting for diagnostic hysteroscopy, laparoscopic lysis of adhesion/possible L salpingectomy, excision of endometriosis, bilateral chromopertubation, any indicated procedures and inguinal hernia repair with Dr. Bui    PMH: T2DM, glaucoma  PSH: laparoscopy - R ovarian cystectomy (endometrioma) in   Imaging: HSG 24  IMPRESSION:  1. Correlate with real time fluoroscopic findings at the time of  procedure.  2. Bilateral tubal disease with obstructed hydrosalpinx on left and  dilated tube with small amount of spill on the right, loculations  present. Filling defects suspected within the uterus.    Obstetrical History   OB History    Para Term  AB Living   1 1 0 1 0 1   SAB IAB Ectopic Multiple Live Births   0 0 0 0 1      # Outcome Date GA Lbr Akhil/2nd Weight Sex Type Anes PTL Lv   1  10/28/20    F    SANDI       Past Medical History  Past Medical History:   Diagnosis Date    Abnormal uterine bleeding (AUB)     Plan: Laparoscopic Excision of Endometriosis; Bilateral Chromopertubation;   Possible Bilateral Ovarian Cystectomy;   Possible Left Salpingectomy;   Hysteroscopy 24    Bell's palsy     Chondromalacia, patella, right     s/p KNEE SCOPE,MENISECTOMY,MED OR LAT    KNEE SCOPE,SHAVE ARTICULAR CART    ARTHRS KNEE W LAT RELEASE    ARTHROSCOPY KNEE MENISCECTOMY MEDIAL OR LATERAL    CHONDROPLASTY KNEE    ARTHROSCOPY KNEE, SURGICAL, LATERAL RELEASE   19    Chronic pelvic pain in female     Endometrioma     Endometriosis     Femoral hernia of right side     Glaucoma     Hydrosalpinx     Hyperlipidemia     Obesity     Type 2 diabetes mellitus     24 7.7%    Vitamin D deficiency         Past Surgical History   Past Surgical History:   Procedure Laterality Date    KNEE Left     knee scope    MENISCECTOMY Right 2019    KNEE  SCOPE,MENISECTOMY,MED OR LAT    KNEE SCOPE,SHAVE ARTICULAR CART    ARTHRS KNEE W LAT RELEASE    ARTHROSCOPY KNEE MENISCECTOMY MEDIAL OR LATERAL    CHONDROPLASTY KNEE    ARTHROSCOPY KNEE, SURGICAL, LATERAL RELEASE       Social History  Social History     Tobacco Use    Smoking status: Never     Passive exposure: Never    Smokeless tobacco: Never   Substance Use Topics    Alcohol use: Never     Substance and Sexual Activity   Drug Use Never       Allergies  Morphine     Medications  No medications prior to admission.       ROS: negative except per HPI    Objective    Last Vitals  There were no vitals taken for this visit.    Physical Examination  General: no acute distress  HEENT: normocephalic, atraumatic  Heart: warm and well perfused  Lungs: breathing comfortably on room air  Abdomen: nondistended  Extremities: moving all extremities  Neuro: awake and conversant  Psych: appropriate mood and affect    Lab Review  Results from last 7 days   Lab Units 11/15/24  0945   HEMOGLOBIN g/dL 14.3   HEMATOCRIT % 43.3   PLATELETS AUTO x10*3/uL 276   CREATININE mg/dL 0.68         Lab Results   Component Value Date    WBC 6.1 11/15/2024    HGB 14.3 11/15/2024    HCT 43.3 11/15/2024    MCV 81 11/15/2024     11/15/2024       Lab Results   Component Value Date    GLUCOSE 98 11/15/2024    CALCIUM 9.1 11/15/2024     11/15/2024    K 4.4 11/15/2024    CO2 25 11/15/2024     11/15/2024    BUN 13 11/15/2024    CREATININE 0.68 11/15/2024       Assessment/Plan     Sarah Gallagher is a 34 y.o. with endometriosis relapse and left sided hydrosalpinx presenting for scheduled surgery.    Plan to proceed with diagnostic hysteroscopy, laparoscopic lysis of adhesion/possible L salpingectomy, excision of endometriosis, bilateral chromopertubation, any indicated procedures  Surgical consent was reviewed. The risks of surgery were discussed including: bleeding (including need for blood transfusion in life-threatening situations;  risks of transfusion), infection, damage to surrounding organs. The patient had the opportunity to answer questions and desired to proceed with surgery following our discussion. Both verbal and written consents were obtained.    Seen and discussed with Dr. Mag Logan 11/22/24 12:06 PM

## 2024-11-22 NOTE — ANESTHESIA POSTPROCEDURE EVALUATION
Patient: Sarah Dunnsall    Procedure Summary       Date: 11/22/24 Room / Location: [facility] OR 09 / [facility]    Anesthesia Start: 1333 Anesthesia Stop: 1656    Procedures:       Laparoscopic Excision of Endometriosis; Bilateral Chromopertubation (Pelvis)      Hysteroscopic polypectomy (Pelvis) Diagnosis:       Endometriosis      Endometrioma      Chronic pelvic pain in female      Hydrosalpinx      Abnormal uterine bleeding (AUB)      Femoral hernia of right side      (Endometriosis [N80.9])      (Endometrioma [N80.129])      (Chronic pelvic pain in female [R10.2, G89.29])      (Hydrosalpinx [N70.11])      (Abnormal uterine bleeding (AUB) [N93.9])      (Femoral hernia of right side [K41.90])    Surgeons: Elsie Hathaway MD Responsible Provider: Cong Kim MD    Anesthesia Type: general ASA Status: 2            Anesthesia Type: general    Vitals Value Taken Time   /66 11/22/24 1701   Temp 36.4 °C (97.5 °F) 11/22/24 1649   Pulse 82 11/22/24 1708   Resp 18 11/22/24 1700   SpO2 100 % 11/22/24 1708   Vitals shown include unfiled device data.    Anesthesia Post Evaluation    Patient location during evaluation: bedside  Patient participation: complete - patient cannot participate  Level of consciousness: awake  Pain score: 0  Pain management: adequate  Airway patency: patent  Cardiovascular status: acceptable and hemodynamically stable  Respiratory status: acceptable and nonlabored ventilation  Hydration status: acceptable  Postoperative Nausea and Vomiting: none        No notable events documented.

## 2024-11-22 NOTE — ANESTHESIA PROCEDURE NOTES
Airway  Date/Time: 11/22/2024 1:44 PM  Urgency: elective    Airway not difficult    Staffing  Performed: BAM and attending   Authorized by: Cong Kim MD    Performed by: ARVIND Jimenez  Patient location during procedure: OR    Indications and Patient Condition  Indications for airway management: anesthesia  Spontaneous Ventilation: absent  Sedation level: deep  Preoxygenated: yes  Mask difficulty assessment: 1 - vent by mask    Final Airway Details  Final airway type: endotracheal airway      Successful airway: ETT  Cuffed: yes   Successful intubation technique: direct laryngoscopy  Facilitating devices/methods: intubating stylet and cricoid pressure  Blade: Shakeel  Blade size: #3  ETT size (mm): 6.5  Cormack-Lehane Classification: grade I - full view of glottis  Placement verified by: chest auscultation and capnometry   Cuff volume (mL): 8  Measured from: lips  ETT to lips (cm): 22  Number of attempts at approach: 1

## 2024-11-22 NOTE — OP NOTE
OPERATIVE REPORT    Patient Name: Sarah Gallagher    MRN: [id number]  Log ID: [id number]    Surgery Date: 11/22/2024    Surgeon(s) and Assistant(s):     * Elsie Hathaway - Primary    Procedures and Anesthesia:  Procedure(s) and Anesthesia Type:     * Laparoscopic Excision of Endometriosis; Bilateral Chromopertubation - General     * Hysteroscopic polypectomy - General   Greater than one hour was spent lysing adhesions related to endometriosis.     Findings:   Hysteroscopy  - normal uterine cavity  - small polyp in right cornua (removed)  - bilateral tubal ostia visualized    Abdomen  - small keloid in previous 5mm umbilical trocar site (removed)    Laparoscopy [stage IV endometriosis]  - normal liver; appendix slightly dilated but overall normal in appearance  - small bowel adhered to right lateral uterus, cervix and bladder peritoneum  [After inspection of abdomen/pelvis, no inguinal hernia appreciated, CT findings likely due to this loop of adhesions; Dr Bui present and in agreement]  - Sigmoid colon adhered to left and right ovaries  - left fallopian tube adhered to left ovary and sigmoid colon; dilated but with spillage of blue chromopertubation dye after dissection  - right fallopian tube adhered to right ovary; not grossly abnormal; could not obtain spill and dye did not appear to enter the right cornu  - left ovary initially retroperitonealized  - right ovary adhered to lateral side wall, sigmoid colon and right tube    Estimated Blood Loss: 100cc    Urine Output: 500cc    IV Fluids: 800cc    Specimens:   Umbilical scar  Uterine polyp    Drains: None    Complications: None     Prepop Diagnosis Code(s): Pre-Op Diagnosis Codes:      * Endometriosis [N80.9]     * Endometrioma [N80.129]     * Chronic pelvic pain in female [R10.2, G89.29]     * Hydrosalpinx [N70.11]     * Abnormal uterine bleeding (AUB) [N93.9]      Postop Diagnosis: same    The patient was taken to the operating room where general anesthesia was  found be adequate. She was prepped and draped in a sterile fashion after placement in the dorsal lithotomy position. A Patrick catheter was inserted into the patient's bladder. A sterile speculum was placed into the patient's vagina and cervix was grasped anteriorly with a single-toothed tenaculum.  The Aveta hysteroscope was advanced into the uterine cavity. Normal saline was the hysteroscopic medium. A polyp was seen.  The Smol resector was used to remove the polyp completely. There was no evidence at any point of uterine perforation.  The hysteroscope was removed and a HALYIE uterine manipulator was inserted. Attention was then turned to the patient's abdomen.     The patient's umbilical keloid scar was grasped with an Allis and resected with Bovie cautery. A small infraumbilical skin incision was then made with a scalpel after injection with 0.25% Marcaine. Following this, the 5-mm optical trocar was used to introduce the laparoscope. three additional 5mm laparoscopic trocars were then introduced under direct visualization with no complications, one on the right and two on the left. Following this, an intra-abdominal survey was performed with the findings noted above.     The left tube was dilated and scarred to her ovary and colon, and upon initial installation of chromopertubation fluid there was dilation of the tube but no spillage. The left ovary was completely retroperitonealized and adhered to the descending colon. The right ovary was scarred to the posterior uterine wall, right tube and descending colon as well. There were loops of small bowel that were adhered in the anterior cul-de-sac as described above. The bowel adhesions were taken down atraumatically and the small bowel was able to fall back into the upper abdomen. Dr Bui with general surgery entered the room at this time and did not appreciate any femoral/inguinal hernia, and likely, the imaging findings were likely due to this adherent bowel.      The cul-de-sac was completely obliterated due to scar tissue. There was a grossly normal-appearing uterus. The adnexa were returned to normal anatomic position using sharp and blunt dissection to free them from any scar tissue, taking great care to avoid trauma to rectum and ureters. The pararectal spaces were entered to dissect the rectum from where it was adhered to the posterior uterus. Ureterolysis was performed on the right to ensure safe dissection, and EEA sizer was placed in the rectum for similar reasons. Greater than one hour was spent lysing adhesions related to endometriosis. There were no endometriotic cysts. After salpingolysis, we obtained spill of dye through the left tube. The right tube appeared grossly normal without hydrosalpinx but dye did not enter the proximal tube. This was thought to be perhaps due to tissue edema. A post op HSG can be performed if desired.     Following this, the abdomen was copiously irrigated and cleared of any clot and debris. A combination of bipolar and monopolar cautery was used for hemostasis throughout the case. Once we were sure there was good hemostasis, a piece of biograft tissue was laid over the right ovarian fossa at the site of considerable dissection and adhered with two interrupted sutures to the sidewall peritoneum. We again irrigated, and the pelvis was found to be completely hemostatic and we were satisfied with the results of the surgery. The skin was then closed using 4-0 Vicryl in a subcuticular fashion.     The patient tolerated the procedure well. All sponge, lap, and needle counts were correct x2 at the end of procedure. She returned to the recovery room in stable condition. She was expected to go home later today.    Dr. Hathaway, the attending of record, was scrubbed and performed the entire procedure with the assistance of the resident/fellow.     Signature: Celestina Logan MD  Date: November 22, 2024  Time: 4:42 PM    Attestation: I reviewed  the key and critical portions of the history and physical exam and/or was physically present for key and critical portions performed by the resident/fellow. I reviewed the resident/fellow's documentation and discussed the patient with the resident/fellow. I agree with the resident/fellow's medical decision making as documented in the resident/fellow's note.   Elsie Hathaway MD

## 2024-11-22 NOTE — PERIOPERATIVE NURSING NOTE
Procedure: laparoscopi excision of endometrosis; bilateral chromopertubation; possible bilateral ovarian cystectomy, possible left salpingectomy, left hysteroscopy  Anesthesia type (i.e. general, regional, MAC): general   Estimated blood loss (EBL) in OR: 100 ml  Orientation/mental status: axo x4  site(s), and drips/fluids currently infusing left hand  PO status (last oral intake): sipping water and eating kayleen crackers in recovery  O2 requirements: 96% on room air  Current pain level & last pain/nausea medication dose/time given:  1721 5mg oxycodone  1721 0.5mg Dilaudid  1721 4mg Zofran  Last void/Patrick in place: placed in or and removed in or  Equipment sent with patient (i.e. Polar Cube, TENs unit, walker, crutches): none  SCDs on (yes/no): yes  Belongings sent with patient: blanket, glasses, cell phone  Emergency contact (name, relationship, phone number): Gary (spouse) [phone]

## 2024-11-23 PROBLEM — Z98.890 POST-OPERATIVE STATE: Status: ACTIVE | Noted: 2024-11-23

## 2024-11-23 LAB
ALBUMIN SERPL BCP-MCNC: 4.4 G/DL (ref 3.4–5)
ALP SERPL-CCNC: 34 U/L (ref 33–110)
ALT SERPL W P-5'-P-CCNC: 9 U/L (ref 7–45)
ANION GAP SERPL CALC-SCNC: 10 MMOL/L (ref 10–20)
AST SERPL W P-5'-P-CCNC: 12 U/L (ref 9–39)
BASOPHILS # BLD AUTO: 0.01 X10*3/UL (ref 0–0.1)
BASOPHILS NFR BLD AUTO: 0.1 %
BILIRUB SERPL-MCNC: 0.5 MG/DL (ref 0–1.2)
BUN SERPL-MCNC: 14 MG/DL (ref 6–23)
CALCIUM SERPL-MCNC: 8.7 MG/DL (ref 8.6–10.3)
CHLORIDE SERPL-SCNC: 107 MMOL/L (ref 98–107)
CO2 SERPL-SCNC: 25 MMOL/L (ref 21–32)
CREAT SERPL-MCNC: 0.76 MG/DL (ref 0.5–1.05)
EGFRCR SERPLBLD CKD-EPI 2021: >90 ML/MIN/1.73M*2
EOSINOPHIL # BLD AUTO: 0 X10*3/UL (ref 0–0.7)
EOSINOPHIL NFR BLD AUTO: 0 %
ERYTHROCYTE [DISTWIDTH] IN BLOOD BY AUTOMATED COUNT: 14.8 % (ref 11.5–14.5)
GLUCOSE BLD MANUAL STRIP-MCNC: 204 MG/DL (ref 74–99)
GLUCOSE BLD MANUAL STRIP-MCNC: 208 MG/DL (ref 74–99)
GLUCOSE BLD MANUAL STRIP-MCNC: 279 MG/DL (ref 74–99)
GLUCOSE BLD MANUAL STRIP-MCNC: 287 MG/DL (ref 74–99)
GLUCOSE BLD MANUAL STRIP-MCNC: 320 MG/DL (ref 74–99)
GLUCOSE SERPL-MCNC: 240 MG/DL (ref 74–99)
HCT VFR BLD AUTO: 34.5 % (ref 36–46)
HGB BLD-MCNC: 11.2 G/DL (ref 12–16)
HOLD SPECIMEN: NORMAL
IMM GRANULOCYTES # BLD AUTO: 0.03 X10*3/UL (ref 0–0.7)
IMM GRANULOCYTES NFR BLD AUTO: 0.4 % (ref 0–0.9)
LYMPHOCYTES # BLD AUTO: 0.87 X10*3/UL (ref 1.2–4.8)
LYMPHOCYTES NFR BLD AUTO: 10.5 %
MCH RBC QN AUTO: 26.5 PG (ref 26–34)
MCHC RBC AUTO-ENTMCNC: 32.5 G/DL (ref 32–36)
MCV RBC AUTO: 82 FL (ref 80–100)
MONOCYTES # BLD AUTO: 0.86 X10*3/UL (ref 0.1–1)
MONOCYTES NFR BLD AUTO: 10.4 %
NEUTROPHILS # BLD AUTO: 6.5 X10*3/UL (ref 1.2–7.7)
NEUTROPHILS NFR BLD AUTO: 78.6 %
NRBC BLD-RTO: 0 /100 WBCS (ref 0–0)
PLATELET # BLD AUTO: 258 X10*3/UL (ref 150–450)
POTASSIUM SERPL-SCNC: 3.8 MMOL/L (ref 3.5–5.3)
PROT SERPL-MCNC: 6.6 G/DL (ref 6.4–8.2)
RBC # BLD AUTO: 4.22 X10*6/UL (ref 4–5.2)
SODIUM SERPL-SCNC: 138 MMOL/L (ref 136–145)
WBC # BLD AUTO: 8.3 X10*3/UL (ref 4.4–11.3)

## 2024-11-23 PROCEDURE — 2500000001 HC RX 250 WO HCPCS SELF ADMINISTERED DRUGS (ALT 637 FOR MEDICARE OP): Performed by: INTERNAL MEDICINE

## 2024-11-23 PROCEDURE — 82947 ASSAY GLUCOSE BLOOD QUANT: CPT | Mod: 59

## 2024-11-23 PROCEDURE — 2500000005 HC RX 250 GENERAL PHARMACY W/O HCPCS: Performed by: INTERNAL MEDICINE

## 2024-11-23 PROCEDURE — G0378 HOSPITAL OBSERVATION PER HR: HCPCS

## 2024-11-23 PROCEDURE — 36415 COLL VENOUS BLD VENIPUNCTURE: CPT | Performed by: STUDENT IN AN ORGANIZED HEALTH CARE EDUCATION/TRAINING PROGRAM

## 2024-11-23 PROCEDURE — 2500000004 HC RX 250 GENERAL PHARMACY W/ HCPCS (ALT 636 FOR OP/ED): Performed by: STUDENT IN AN ORGANIZED HEALTH CARE EDUCATION/TRAINING PROGRAM

## 2024-11-23 PROCEDURE — 2500000001 HC RX 250 WO HCPCS SELF ADMINISTERED DRUGS (ALT 637 FOR MEDICARE OP): Performed by: STUDENT IN AN ORGANIZED HEALTH CARE EDUCATION/TRAINING PROGRAM

## 2024-11-23 PROCEDURE — 84075 ASSAY ALKALINE PHOSPHATASE: CPT | Performed by: STUDENT IN AN ORGANIZED HEALTH CARE EDUCATION/TRAINING PROGRAM

## 2024-11-23 PROCEDURE — 2500000002 HC RX 250 W HCPCS SELF ADMINISTERED DRUGS (ALT 637 FOR MEDICARE OP, ALT 636 FOR OP/ED): Performed by: STUDENT IN AN ORGANIZED HEALTH CARE EDUCATION/TRAINING PROGRAM

## 2024-11-23 PROCEDURE — 7100000011 HC EXTENDED STAY RECOVERY HOURLY - NURSING UNIT

## 2024-11-23 PROCEDURE — 85025 COMPLETE CBC W/AUTO DIFF WBC: CPT | Performed by: STUDENT IN AN ORGANIZED HEALTH CARE EDUCATION/TRAINING PROGRAM

## 2024-11-23 PROCEDURE — 82947 ASSAY GLUCOSE BLOOD QUANT: CPT | Mod: 91

## 2024-11-23 RX ORDER — TIMOLOL MALEATE 5 MG/ML
1 SOLUTION/ DROPS OPHTHALMIC NIGHTLY
Status: DISCONTINUED | OUTPATIENT
Start: 2024-11-23 | End: 2024-11-24 | Stop reason: HOSPADM

## 2024-11-23 RX ORDER — BRIMONIDINE TARTRATE 2 MG/ML
1 SOLUTION/ DROPS OPHTHALMIC 2 TIMES DAILY
Status: DISCONTINUED | OUTPATIENT
Start: 2024-11-23 | End: 2024-11-24 | Stop reason: HOSPADM

## 2024-11-23 RX ORDER — INSULIN LISPRO 100 [IU]/ML
4 INJECTION, SOLUTION INTRAVENOUS; SUBCUTANEOUS
Status: DISCONTINUED | OUTPATIENT
Start: 2024-11-23 | End: 2024-11-24 | Stop reason: HOSPADM

## 2024-11-23 RX ORDER — DEXTROSE 50 % IN WATER (D50W) INTRAVENOUS SYRINGE
25
Status: DISCONTINUED | OUTPATIENT
Start: 2024-11-23 | End: 2024-11-23

## 2024-11-23 RX ORDER — INSULIN LISPRO 100 [IU]/ML
0-10 INJECTION, SOLUTION INTRAVENOUS; SUBCUTANEOUS
Status: DISCONTINUED | OUTPATIENT
Start: 2024-11-23 | End: 2024-11-24 | Stop reason: HOSPADM

## 2024-11-23 RX ORDER — SODIUM CHLORIDE 9 MG/ML
100 INJECTION, SOLUTION INTRAVENOUS CONTINUOUS
Status: ACTIVE | OUTPATIENT
Start: 2024-11-23 | End: 2024-11-24

## 2024-11-23 RX ORDER — DEXTROSE 50 % IN WATER (D50W) INTRAVENOUS SYRINGE
12.5
Status: DISCONTINUED | OUTPATIENT
Start: 2024-11-23 | End: 2024-11-23

## 2024-11-23 RX ORDER — INSULIN LISPRO 100 [IU]/ML
2 INJECTION, SOLUTION INTRAVENOUS; SUBCUTANEOUS ONCE
Status: DISCONTINUED | OUTPATIENT
Start: 2024-11-23 | End: 2024-11-23

## 2024-11-23 RX ORDER — LATANOPROST 50 UG/ML
1 SOLUTION/ DROPS OPHTHALMIC NIGHTLY
Status: DISCONTINUED | OUTPATIENT
Start: 2024-11-23 | End: 2024-11-24 | Stop reason: HOSPADM

## 2024-11-23 RX ORDER — DEXTROSE 50 % IN WATER (D50W) INTRAVENOUS SYRINGE
12.5
Status: DISCONTINUED | OUTPATIENT
Start: 2024-11-23 | End: 2024-11-24 | Stop reason: HOSPADM

## 2024-11-23 RX ORDER — DEXTROSE 50 % IN WATER (D50W) INTRAVENOUS SYRINGE
25
Status: DISCONTINUED | OUTPATIENT
Start: 2024-11-23 | End: 2024-11-24 | Stop reason: HOSPADM

## 2024-11-23 RX ORDER — INSULIN LISPRO 100 [IU]/ML
4 INJECTION, SOLUTION INTRAVENOUS; SUBCUTANEOUS ONCE
Status: COMPLETED | OUTPATIENT
Start: 2024-11-23 | End: 2024-11-23

## 2024-11-23 ASSESSMENT — PAIN DESCRIPTION - ORIENTATION
ORIENTATION: RIGHT;LEFT
ORIENTATION: LEFT;RIGHT
ORIENTATION: RIGHT;LEFT
ORIENTATION: LEFT;RIGHT
ORIENTATION: RIGHT;LEFT
ORIENTATION: LEFT;RIGHT

## 2024-11-23 ASSESSMENT — PAIN SCALES - GENERAL
PAINLEVEL_OUTOF10: 2
PAINLEVEL_OUTOF10: 8
PAINLEVEL_OUTOF10: 7
PAINLEVEL_OUTOF10: 10 - WORST POSSIBLE PAIN
PAINLEVEL_OUTOF10: 6
PAINLEVEL_OUTOF10: 9
PAINLEVEL_OUTOF10: 5 - MODERATE PAIN
PAINLEVEL_OUTOF10: 8
PAINLEVEL_OUTOF10: 6
PAINLEVEL_OUTOF10: 6
PAINLEVEL_OUTOF10: 8
PAINLEVEL_OUTOF10: 10 - WORST POSSIBLE PAIN

## 2024-11-23 ASSESSMENT — COGNITIVE AND FUNCTIONAL STATUS - GENERAL
DAILY ACTIVITIY SCORE: 24
MOBILITY SCORE: 24

## 2024-11-23 ASSESSMENT — PAIN DESCRIPTION - LOCATION
LOCATION: ABDOMEN

## 2024-11-23 ASSESSMENT — PAIN - FUNCTIONAL ASSESSMENT
PAIN_FUNCTIONAL_ASSESSMENT: 0-10

## 2024-11-23 NOTE — CARE PLAN
The patient's goals for the shift include to control pain    The clinical goals for the shift include pain control    Over the shift, the patient did not make progress toward the following goals. Barriers to progression include post surgical. Recommendations to address these barriers include pain management.

## 2024-11-23 NOTE — PROGRESS NOTES
Sarah is a 35yo F status post laparoscopic management of endometriosis, chromopertubatoin, hysteroscopic polypectomy for stage IV endometriosis and uterine polyp. Patient with suboptimally controlled T2 DM.    Surgery complicated by significant adhesive disease and need for extensive lysis of adhesions.    Overnight suboptimal glucose control.    On Postoperative day 1  -pain is suboptimally controlled with IV and PO pain medication  -lightheaded with ambulation  -urinating well  -no appetite.    Objectively:  Appears uncomfortable, in pain  Heart - rrr, no tachycardia noted at this time  Lungs: CTA bl  Abdomen: tenderly distended, slight guarding noted.  Insicions: CDI  LE: nontender bilaterally        11/22/2024     9:55 PM 11/23/2024    12:44 AM 11/23/2024     1:06 AM 11/23/2024     3:13 AM 11/23/2024     8:49 AM 11/23/2024    11:00 AM 11/23/2024    11:29 AM   Vitals   Systolic 115 116  111 113  122   Diastolic 76 71  76 65  78   BP Location Right arm Right arm  Right arm      Heart Rate 103 125 109 97 83 87    Temp 36.8 °C (98.2 °F) 37 °C (98.6 °F)  37.2 °C (99 °F) 36.6 °C (97.8 °F)  37.3 °C (99.2 °F)   Resp 17 22  20 18  18       Plan:  -insulin sliding scale  -clear liquid diet  -cmp  -Consult hospitalist service to comanage the T2DM  -pain control with IV pain medication regimen  -keep overnight.  -encourage ambulation as tolerated.    Rivka Bradshaw 11/23/24 12:46 PM    I reviewed the key and critical portions of the history and physical exam and/or was physically present for the key and critical portions performed by the fellow.  I reviewed the fellow's documentation and discussed the patient with the fellow.  I agree with the fellow's medical decision making as documented on the fellow's note.    I saw the patient 11/23 and agree with plan as above.  Decision made to keep patient overnight due to lack of sufficient pain control and abdominal distension with lack of bowel sounds.  Agree with clear  liquid diet and consult to hospitalist to optimize glycemic control,     Juani Espinal 11/24/24 11:13 AM

## 2024-11-23 NOTE — CARE PLAN
The patient's goals for the shift include      The clinical goals for the shift include hds, pain control safety      Problem: Pain - Adult  Goal: Verbalizes/displays adequate comfort level or baseline comfort level  Outcome: Progressing     Problem: Safety - Adult  Goal: Free from fall injury  Outcome: Progressing     Problem: Discharge Planning  Goal: Discharge to home or other facility with appropriate resources  Outcome: Progressing     Problem: Chronic Conditions and Co-morbidities  Goal: Patient's chronic conditions and co-morbidity symptoms are monitored and maintained or improved  Outcome: Progressing     Problem: Pain  Goal: Takes deep breaths with improved pain control throughout the shift  Outcome: Progressing  Goal: Turns in bed with improved pain control throughout the shift  Outcome: Progressing  Goal: Walks with improved pain control throughout the shift  Outcome: Progressing  Goal: Performs ADL's with improved pain control throughout shift  Outcome: Progressing  Goal: Participates in PT with improved pain control throughout the shift  Outcome: Progressing  Goal: Free from opioid side effects throughout the shift  Outcome: Progressing  Goal: Free from acute confusion related to pain meds throughout the shift  Outcome: Progressing

## 2024-11-23 NOTE — CARE PLAN
The patient's goals for the shift include      The clinical goals for the shift include hds, pain control safety

## 2024-11-23 NOTE — NURSING NOTE
Approximately 00:35 this morning, patient complained of chest pain following prior complaint of stomach pain. EKG showed reading of Heart Rate of 126 with Atrial flutter. Patient, stated chest pain woke her up suddenly from her sleep minutes before calling with complaint. Assisted patient to bathroom which patient ambulation well and had successful void of urine. Upon returning to bed, A repeat EKG was done showing a heart rate of 109 with sinus tach. Patient stated walking did help with pain. Patients stomach observed with some distention and taut. Patient had not passed gas at this point, in which suggestion for a walk around the building may help. Patient agreed and was assisted by myself and significant other to take a half lap around the unit floor. Patient tolerated walk well, did not see initial improvement. Patient provided with Scheduled and PRN pain medication including PRN Simethicone. Meds were tolerated well with no issue. Attending notified of situation approximately 01:08, no new orders provided at this time.

## 2024-11-24 VITALS
OXYGEN SATURATION: 98 % | HEIGHT: 58 IN | SYSTOLIC BLOOD PRESSURE: 119 MMHG | BODY MASS INDEX: 26.47 KG/M2 | RESPIRATION RATE: 18 BRPM | WEIGHT: 126.1 LBS | HEART RATE: 75 BPM | DIASTOLIC BLOOD PRESSURE: 78 MMHG | TEMPERATURE: 97.5 F

## 2024-11-24 PROBLEM — N93.9 ABNORMAL UTERINE BLEEDING (AUB): Status: RESOLVED | Noted: 2024-10-16 | Resolved: 2024-11-24

## 2024-11-24 PROBLEM — K41.90 FEMORAL HERNIA OF RIGHT SIDE: Status: RESOLVED | Noted: 2024-10-16 | Resolved: 2024-11-24

## 2024-11-24 PROBLEM — N70.11 HYDROSALPINX: Status: RESOLVED | Noted: 2024-10-16 | Resolved: 2024-11-24

## 2024-11-24 PROBLEM — N80.9 ENDOMETRIOSIS: Status: RESOLVED | Noted: 2024-10-16 | Resolved: 2024-11-24

## 2024-11-24 PROBLEM — N80.129 ENDOMETRIOMA: Status: RESOLVED | Noted: 2024-10-16 | Resolved: 2024-11-24

## 2024-11-24 LAB
ANION GAP SERPL CALC-SCNC: 11 MMOL/L (ref 10–20)
BASOPHILS # BLD AUTO: 0.02 X10*3/UL (ref 0–0.1)
BASOPHILS NFR BLD AUTO: 0.4 %
BUN SERPL-MCNC: 9 MG/DL (ref 6–23)
CALCIUM SERPL-MCNC: 8 MG/DL (ref 8.6–10.3)
CHLORIDE SERPL-SCNC: 107 MMOL/L (ref 98–107)
CO2 SERPL-SCNC: 24 MMOL/L (ref 21–32)
CREAT SERPL-MCNC: 0.54 MG/DL (ref 0.5–1.05)
EGFRCR SERPLBLD CKD-EPI 2021: >90 ML/MIN/1.73M*2
EOSINOPHIL # BLD AUTO: 0.02 X10*3/UL (ref 0–0.7)
EOSINOPHIL NFR BLD AUTO: 0.4 %
ERYTHROCYTE [DISTWIDTH] IN BLOOD BY AUTOMATED COUNT: 14.9 % (ref 11.5–14.5)
GLUCOSE BLD MANUAL STRIP-MCNC: 178 MG/DL (ref 74–99)
GLUCOSE BLD MANUAL STRIP-MCNC: 184 MG/DL (ref 74–99)
GLUCOSE BLD MANUAL STRIP-MCNC: 199 MG/DL (ref 74–99)
GLUCOSE SERPL-MCNC: 207 MG/DL (ref 74–99)
HCT VFR BLD AUTO: 33.8 % (ref 36–46)
HGB BLD-MCNC: 11.3 G/DL (ref 12–16)
IMM GRANULOCYTES # BLD AUTO: 0.02 X10*3/UL (ref 0–0.7)
IMM GRANULOCYTES NFR BLD AUTO: 0.4 % (ref 0–0.9)
LYMPHOCYTES # BLD AUTO: 2.04 X10*3/UL (ref 1.2–4.8)
LYMPHOCYTES NFR BLD AUTO: 37.8 %
MAGNESIUM SERPL-MCNC: 1.68 MG/DL (ref 1.6–2.4)
MCH RBC QN AUTO: 27.4 PG (ref 26–34)
MCHC RBC AUTO-ENTMCNC: 33.4 G/DL (ref 32–36)
MCV RBC AUTO: 82 FL (ref 80–100)
MONOCYTES # BLD AUTO: 0.45 X10*3/UL (ref 0.1–1)
MONOCYTES NFR BLD AUTO: 8.3 %
NEUTROPHILS # BLD AUTO: 2.84 X10*3/UL (ref 1.2–7.7)
NEUTROPHILS NFR BLD AUTO: 52.7 %
NRBC BLD-RTO: 0 /100 WBCS (ref 0–0)
PHOSPHATE SERPL-MCNC: 2 MG/DL (ref 2.5–4.9)
PLATELET # BLD AUTO: 250 X10*3/UL (ref 150–450)
POTASSIUM SERPL-SCNC: 3.8 MMOL/L (ref 3.5–5.3)
RBC # BLD AUTO: 4.12 X10*6/UL (ref 4–5.2)
SODIUM SERPL-SCNC: 138 MMOL/L (ref 136–145)
WBC # BLD AUTO: 5.4 X10*3/UL (ref 4.4–11.3)

## 2024-11-24 PROCEDURE — 7100000011 HC EXTENDED STAY RECOVERY HOURLY - NURSING UNIT

## 2024-11-24 PROCEDURE — 58662 LAPAROSCOPY EXCISE LESIONS: CPT | Performed by: OBSTETRICS & GYNECOLOGY

## 2024-11-24 PROCEDURE — 85025 COMPLETE CBC W/AUTO DIFF WBC: CPT | Performed by: STUDENT IN AN ORGANIZED HEALTH CARE EDUCATION/TRAINING PROGRAM

## 2024-11-24 PROCEDURE — 82947 ASSAY GLUCOSE BLOOD QUANT: CPT | Mod: 59

## 2024-11-24 PROCEDURE — 2500000002 HC RX 250 W HCPCS SELF ADMINISTERED DRUGS (ALT 637 FOR MEDICARE OP, ALT 636 FOR OP/ED): Performed by: STUDENT IN AN ORGANIZED HEALTH CARE EDUCATION/TRAINING PROGRAM

## 2024-11-24 PROCEDURE — 2500000001 HC RX 250 WO HCPCS SELF ADMINISTERED DRUGS (ALT 637 FOR MEDICARE OP): Performed by: STUDENT IN AN ORGANIZED HEALTH CARE EDUCATION/TRAINING PROGRAM

## 2024-11-24 PROCEDURE — 80048 BASIC METABOLIC PNL TOTAL CA: CPT | Performed by: STUDENT IN AN ORGANIZED HEALTH CARE EDUCATION/TRAINING PROGRAM

## 2024-11-24 PROCEDURE — 58558 HYSTEROSCOPY BIOPSY: CPT | Performed by: OBSTETRICS & GYNECOLOGY

## 2024-11-24 PROCEDURE — 83735 ASSAY OF MAGNESIUM: CPT | Performed by: STUDENT IN AN ORGANIZED HEALTH CARE EDUCATION/TRAINING PROGRAM

## 2024-11-24 PROCEDURE — 36415 COLL VENOUS BLD VENIPUNCTURE: CPT | Performed by: STUDENT IN AN ORGANIZED HEALTH CARE EDUCATION/TRAINING PROGRAM

## 2024-11-24 PROCEDURE — 84100 ASSAY OF PHOSPHORUS: CPT | Performed by: STUDENT IN AN ORGANIZED HEALTH CARE EDUCATION/TRAINING PROGRAM

## 2024-11-24 PROCEDURE — 2500000002 HC RX 250 W HCPCS SELF ADMINISTERED DRUGS (ALT 637 FOR MEDICARE OP, ALT 636 FOR OP/ED): Performed by: INTERNAL MEDICINE

## 2024-11-24 ASSESSMENT — COGNITIVE AND FUNCTIONAL STATUS - GENERAL
DAILY ACTIVITIY SCORE: 24
MOBILITY SCORE: 24

## 2024-11-24 ASSESSMENT — PAIN - FUNCTIONAL ASSESSMENT
PAIN_FUNCTIONAL_ASSESSMENT: 0-10

## 2024-11-24 ASSESSMENT — PAIN SCALES - GENERAL
PAINLEVEL_OUTOF10: 8
PAINLEVEL_OUTOF10: 10 - WORST POSSIBLE PAIN
PAINLEVEL_OUTOF10: 8
PAINLEVEL_OUTOF10: 6

## 2024-11-24 ASSESSMENT — PAIN DESCRIPTION - ORIENTATION: ORIENTATION: RIGHT;LEFT

## 2024-11-24 ASSESSMENT — PAIN DESCRIPTION - LOCATION: LOCATION: ABDOMEN

## 2024-11-24 NOTE — CARE PLAN
The patient's goals for the shift include      The clinical goals for the shift include pain control, ambulate

## 2024-11-24 NOTE — DISCHARGE SUMMARY
Patient, Sarah Gallagher, 33yo F, status post laparoscopic management of severe endometriosis, lysis of adhesions, chromopertubation, hysteroscopic polypectomy; postoperative day 2. Patient's postoperatice course significant for T2DM diabetes flare on ISS.    Patient seen and examined this morning at bedside,    Pain better controlled today, better appetite without nausea or vomiting. Reports flatus. Urinates without difficulty. Ambulates. Had an episode of chest pain in the AM, resolved with TUMs; EKG performed - NSR (I have not personally seen the EKG report)        11/23/2024    11:29 AM 11/23/2024     3:53 PM 11/23/2024     9:59 PM 11/24/2024     6:08 AM 11/24/2024     8:26 AM 11/24/2024    11:40 AM 11/24/2024     4:35 PM   Vitals   Systolic 122 112 142 127 144 121 119   Diastolic 78 74 94 82 87 85 78   BP Location   Left arm Right arm      Heart Rate  96 79 67 66  75   Temp 37.3 °C (99.2 °F) 36.6 °C (97.9 °F) 37.1 °C (98.8 °F) 36.4 °C (97.5 °F) 36.6 °C (97.8 °F) 36.6 °C (97.8 °F) 36.4 °C (97.5 °F)   Resp 18 18 16 16 18 18 18     AMA labs including CBC, BMP, electrolytes reviewed.    Objective:  NAD, resting in bed, partner at bedside.  Chest: no chachicardia  Lungs: speaks in full sentences without SOB or cough  Abdomen: tender to deep palpation, mildly distended.  Incisions: CDI  LE: nontender bilaterally    A/P  Postoperative day 2  -discharge home  -resume home diabetes medication  -ambulation encouraged  -po pain control as prescribed  -follow up in 1 week with RENE NP for incision check  -follow up 6-8 weeks with Dr. Hathaway for discussion of next steps  -follow up with endocrinologist regarding T2DM med adjustment as needed.    I reviewed the key and critical portions of the history and physical exam and/or was physically present for the key and critical portions performed by the fellow.  I reviewed the fellow's documentation and discussed the patient with the fellow.  I agree with the fellow's medical  decision making as documented on the fellow's note.  Patient discussed with Dr. Bradshaw and Dr. Hathaway- we all agree patient meets criteria for discharge with resumption of diabetes medication  Juani Espinal 11/25/24 1:01 PM

## 2024-11-24 NOTE — CONSULTS
Consults    Reason For Consult  Blood sugar control    History Of Present Illness  Sarah Gallagher is a 34 y.o. female presenting with s/p surgery for endometriosis. Blood sugars are slightly high. She has type 2 DM and not on insulin.     Past Medical History  She has a past medical history of Abnormal uterine bleeding (AUB), Bell's palsy, Chondromalacia, patella, right, Chronic pelvic pain in female, Endometrioma, Endometriosis, Femoral hernia of right side, Glaucoma, Hydrosalpinx, Hyperlipidemia, Obesity, Type 2 diabetes mellitus, and Vitamin D deficiency.    Surgical History  She has a past surgical history that includes XR knee (Left) and Meniscectomy (Right, 05/24/2019).     Social History  She reports that she has never smoked. She has never been exposed to tobacco smoke. She has never used smokeless tobacco. She reports that she does not drink alcohol and does not use drugs.    Family History  Family History   Problem Relation Name Age of Onset    No Known Problems Mother      No Known Problems Father      Lung cancer Other grand mother         Allergies  Morphine    Review of Systems  Vision okay.  No chest pain or palpitations.  No nausea vomiting, diarrhea or abdominal pain.  No dysuria  No headache   No weakness     Physical Exam  HEENT exam NL  Neck no JVD  Ht S1 S2 regular rate and rhythm  Lung clesr  Abd soft  Extremities no edema  NS Alert oriented X 3       Last Recorded Vitals  /87   Pulse 66   Temp 36.6 °C (97.8 °F) (Temporal)   Resp 18   Wt 57.2 kg (126 lb 1.7 oz)   SpO2 93%     Relevant Results       Assessment/Plan     Type 2 DM    Plan Sliding scale and prandial short acting Insulin and monitor    Bailey Scruggs MD

## 2024-11-25 ASSESSMENT — PAIN SCALES - GENERAL: PAINLEVEL_OUTOF10: 6

## 2024-11-26 ENCOUNTER — TELEPHONE (OUTPATIENT)
Dept: ENDOCRINOLOGY | Facility: CLINIC | Age: 34
End: 2024-11-26
Payer: COMMERCIAL

## 2024-11-26 ENCOUNTER — CLINICAL SUPPORT (OUTPATIENT)
Dept: EMERGENCY MEDICINE | Facility: HOSPITAL | Age: 34
End: 2024-11-26
Payer: COMMERCIAL

## 2024-11-26 ENCOUNTER — HOSPITAL ENCOUNTER (EMERGENCY)
Facility: HOSPITAL | Age: 34
Discharge: HOME | End: 2024-11-26
Payer: COMMERCIAL

## 2024-11-26 VITALS
WEIGHT: 126 LBS | TEMPERATURE: 97.5 F | SYSTOLIC BLOOD PRESSURE: 132 MMHG | HEART RATE: 103 BPM | DIASTOLIC BLOOD PRESSURE: 80 MMHG | BODY MASS INDEX: 26.45 KG/M2 | HEIGHT: 58 IN | RESPIRATION RATE: 20 BRPM | OXYGEN SATURATION: 100 %

## 2024-11-26 LAB
ALBUMIN SERPL BCP-MCNC: 5 G/DL (ref 3.4–5)
ALP SERPL-CCNC: 48 U/L (ref 33–110)
ALT SERPL W P-5'-P-CCNC: 38 U/L (ref 7–45)
ANION GAP SERPL CALC-SCNC: 16 MMOL/L (ref 10–20)
APPEARANCE UR: CLEAR
AST SERPL W P-5'-P-CCNC: 39 U/L (ref 9–39)
ATRIAL RATE: 113 BPM
BASOPHILS # BLD AUTO: 0.02 X10*3/UL (ref 0–0.1)
BASOPHILS NFR BLD AUTO: 0.3 %
BILIRUB SERPL-MCNC: 0.5 MG/DL (ref 0–1.2)
BILIRUB UR STRIP.AUTO-MCNC: NEGATIVE MG/DL
BNP SERPL-MCNC: 2 PG/ML (ref 0–99)
BUN SERPL-MCNC: 13 MG/DL (ref 6–23)
CALCIUM SERPL-MCNC: 10.2 MG/DL (ref 8.6–10.6)
CARDIAC TROPONIN I PNL SERPL HS: <3 NG/L (ref 0–34)
CHLORIDE SERPL-SCNC: 96 MMOL/L (ref 98–107)
CO2 SERPL-SCNC: 29 MMOL/L (ref 21–32)
COLOR UR: ABNORMAL
CREAT SERPL-MCNC: 0.78 MG/DL (ref 0.5–1.05)
EGFRCR SERPLBLD CKD-EPI 2021: >90 ML/MIN/1.73M*2
EOSINOPHIL # BLD AUTO: 0.02 X10*3/UL (ref 0–0.7)
EOSINOPHIL NFR BLD AUTO: 0.3 %
ERYTHROCYTE [DISTWIDTH] IN BLOOD BY AUTOMATED COUNT: 14.3 % (ref 11.5–14.5)
GLUCOSE BLD MANUAL STRIP-MCNC: 247 MG/DL (ref 74–99)
GLUCOSE SERPL-MCNC: 245 MG/DL (ref 74–99)
GLUCOSE UR STRIP.AUTO-MCNC: ABNORMAL MG/DL
HCT VFR BLD AUTO: 41.1 % (ref 36–46)
HGB BLD-MCNC: 14 G/DL (ref 12–16)
IMM GRANULOCYTES # BLD AUTO: 0.02 X10*3/UL (ref 0–0.7)
IMM GRANULOCYTES NFR BLD AUTO: 0.3 % (ref 0–0.9)
KETONES UR STRIP.AUTO-MCNC: ABNORMAL MG/DL
LEUKOCYTE ESTERASE UR QL STRIP.AUTO: NEGATIVE
LYMPHOCYTES # BLD AUTO: 2.56 X10*3/UL (ref 1.2–4.8)
LYMPHOCYTES NFR BLD AUTO: 32.2 %
MCH RBC QN AUTO: 26.6 PG (ref 26–34)
MCHC RBC AUTO-ENTMCNC: 34.1 G/DL (ref 32–36)
MCV RBC AUTO: 78 FL (ref 80–100)
MONOCYTES # BLD AUTO: 0.55 X10*3/UL (ref 0.1–1)
MONOCYTES NFR BLD AUTO: 6.9 %
NEUTROPHILS # BLD AUTO: 4.78 X10*3/UL (ref 1.2–7.7)
NEUTROPHILS NFR BLD AUTO: 60 %
NITRITE UR QL STRIP.AUTO: NEGATIVE
NRBC BLD-RTO: 0 /100 WBCS (ref 0–0)
P AXIS: 62 DEGREES
P OFFSET: 203 MS
P ONSET: 157 MS
PH UR STRIP.AUTO: 7 [PH]
PLATELET # BLD AUTO: 355 X10*3/UL (ref 150–450)
POTASSIUM SERPL-SCNC: 4.8 MMOL/L (ref 3.5–5.3)
PR INTERVAL: 128 MS
PROT SERPL-MCNC: 8.3 G/DL (ref 6.4–8.2)
PROT UR STRIP.AUTO-MCNC: NEGATIVE MG/DL
Q ONSET: 221 MS
QRS COUNT: 18 BEATS
QRS DURATION: 74 MS
QT INTERVAL: 348 MS
QTC CALCULATION(BAZETT): 477 MS
QTC FREDERICIA: 429 MS
R AXIS: 50 DEGREES
RBC # BLD AUTO: 5.27 X10*6/UL (ref 4–5.2)
RBC # UR STRIP.AUTO: ABNORMAL /UL
RBC #/AREA URNS AUTO: NORMAL /HPF
SODIUM SERPL-SCNC: 136 MMOL/L (ref 136–145)
SP GR UR STRIP.AUTO: 1.03
SQUAMOUS #/AREA URNS AUTO: NORMAL /HPF
T AXIS: 14 DEGREES
T OFFSET: 395 MS
UROBILINOGEN UR STRIP.AUTO-MCNC: NORMAL MG/DL
VENTRICULAR RATE: 113 BPM
WBC # BLD AUTO: 8 X10*3/UL (ref 4.4–11.3)
WBC #/AREA URNS AUTO: NORMAL /HPF

## 2024-11-26 PROCEDURE — 85025 COMPLETE CBC W/AUTO DIFF WBC: CPT | Performed by: EMERGENCY MEDICINE

## 2024-11-26 PROCEDURE — 99281 EMR DPT VST MAYX REQ PHY/QHP: CPT

## 2024-11-26 PROCEDURE — 84484 ASSAY OF TROPONIN QUANT: CPT | Performed by: EMERGENCY MEDICINE

## 2024-11-26 PROCEDURE — 82947 ASSAY GLUCOSE BLOOD QUANT: CPT

## 2024-11-26 PROCEDURE — 83880 ASSAY OF NATRIURETIC PEPTIDE: CPT | Performed by: EMERGENCY MEDICINE

## 2024-11-26 PROCEDURE — 36415 COLL VENOUS BLD VENIPUNCTURE: CPT | Performed by: EMERGENCY MEDICINE

## 2024-11-26 PROCEDURE — 93005 ELECTROCARDIOGRAM TRACING: CPT

## 2024-11-26 PROCEDURE — 81001 URINALYSIS AUTO W/SCOPE: CPT | Performed by: EMERGENCY MEDICINE

## 2024-11-26 PROCEDURE — 80053 COMPREHEN METABOLIC PANEL: CPT | Performed by: EMERGENCY MEDICINE

## 2024-11-26 ASSESSMENT — PAIN - FUNCTIONAL ASSESSMENT: PAIN_FUNCTIONAL_ASSESSMENT: 0-10

## 2024-11-26 ASSESSMENT — PAIN SCALES - GENERAL: PAINLEVEL_OUTOF10: 10 - WORST POSSIBLE PAIN

## 2024-11-26 NOTE — TELEPHONE ENCOUNTER
Called the patient she verified her name and date of birth Patient is having chest pain She has had chest pain since 11-22-24 Patient states it wakes her from a sleep Patient states she is also having trouble catching her breath I advised she needs to go to the emergency room I advised if she can not drive she needs to call 911 Patient verbalized an understanding    RADHA SHAIKH 11/26/24 2:46 PM

## 2024-11-26 NOTE — ED TRIAGE NOTES
Cp that radiates to L shoulder that began after her laparoscopic endometrial surgery last week. Pt is also reporting SOB

## 2024-11-27 LAB — HOLD SPECIMEN: NORMAL

## 2024-11-27 NOTE — PROGRESS NOTES
Telephone encounter    Spoke to patient on the phone as it was noted that she left the ED without full evaluation. She reports she is still in significant pain, but due to the long wait she decided to go home to rest. I explained my concerns about her post operative symptoms and my recommendation for a thorough evaluation. I also asked if she could go another ED. She states she would like to try tums and gasx as she thinks her pain may be related to gas and reflux. She does report having a BM today that improved symptoms. I still urged her for evaluation which she declined. Will call patient again in a few hours to evaluate symptoms.     D/w Dr. Mag Zamora MD    Addendum: patient reports symptoms are much better and would like to continue monitoring at home. Instructed to call answering service if symptoms worsen.     Wendy Zamora MD

## 2024-12-06 ENCOUNTER — APPOINTMENT (OUTPATIENT)
Dept: SURGERY | Facility: CLINIC | Age: 34
End: 2024-12-06
Payer: COMMERCIAL

## 2024-12-08 NOTE — PROGRESS NOTES
Virtual or Telephone Consent: An interactive audio and video telecommunication system which permits real time communications between the patient (at the originating site) and provider (at the distant site) was utilized to provide this telehealth service    Post Op Visit    Seen Sarah Gallagher 34 yr old female  s/p  Laparoscopic Excision of Endometriosis; Bilateral Chromopertubation, & Hysteroscopic polypectomy on 2024 with Dr. Hathaway - Greater than one hour was spent lysing adhesions related to endometriosis. Doing well.   4 abdominal Incisions are healing well: 1 umbilical, 1 LLQ, 1 left lateral: denies redness, swelling, or drainage. 1 RLQ: incision is slightly swollen- denies any redness/drainage. Glue intact to all incisions.   No issues with bowel or bladder. Last BM 4 days ago, urinating okay.   No nausea, vomiting, fever or chills  4/10 pain medications- taking Ibuprofen PRN- has not taken in 4 days.   No vaginal discharge or abnormal bleeding.     Vitals: There were no vitals taken for this visit.    Abdomen: soft, non-tender, all 4 incisions healing- denies redness or drainage- RLQ incision slightly swollen- will observe and come in for evaluation if develops any redness/drainage.     Prior labs:   CBC  Date: 2024  Plt: 355 (Ref range: 150 - 450 x10*3/uL)  Hct: 41.1 (Ref range: 36.0 - 46.0 %)    CMP  Date: 2024   BUN: 13 (Ref range: 6 - 23 mg/dL)  Cre: 0.78 (Ref range: 0.50 - 1.05 mg/dL)  AST: 39 (Ref range: 9 - 39 U/L)  ALT: 38 (Ref range: 7 - 45 U/L)    Assessment:   Normal post op, uncomplicated recovery    Plan:  Reviewed surgery report: Greater than one hour was spent lysing adhesions related to endometriosis.   Findings:   Hysteroscopy  - normal uterine cavity  - small polyp in right cornua (removed)  - bilateral tubal ostia visualized     Abdomen  - small keloid in previous 5mm umbilical trocar site (removed)     Laparoscopy [stage IV endometriosis]  - normal liver;  appendix slightly dilated but overall normal in appearance  - small bowel adhered to right lateral uterus, cervix and bladder peritoneum  [After inspection of abdomen/pelvis, no inguinal hernia appreciated, CT findings likely due to this loop of adhesions; Dr Bui present and in agreement]  - Sigmoid colon adhered to left and right ovaries  - left fallopian tube adhered to left ovary and sigmoid colon; dilated but with spillage of blue chromopertubation dye after dissection  - right fallopian tube adhered to right ovary; not grossly abnormal; could not obtain spill and dye did not appear to enter the right cornu- feels this may be due to tissue edema in the area of the tube  - left ovary initially retroperitonealized  - right ovary adhered to lateral side wall, sigmoid colon and right tube    Reviewed pathology report: pending  Start Colace 100 mg BID, start Miralax daily, add in Prune juice daily, can take MOM if needs to, increase water and fiber intake  Follow up 1-2-2025 with Dr. Hathaway  RTW letter adjusted for 12-, returned to work today and is not feeling well with pain.   Needs future HSG to assess right tubal status, left tube open- will call CD 1 to schedule- will start day before HSG- Doxycycline 100 mg BID x 5 days total- sent to local [facility]    Abi Crockett CNP 12/09/24 12:20 PM

## 2024-12-09 ENCOUNTER — TELEMEDICINE (OUTPATIENT)
Dept: ENDOCRINOLOGY | Facility: CLINIC | Age: 34
End: 2024-12-09
Payer: COMMERCIAL

## 2024-12-09 DIAGNOSIS — N97.1 TUBAL OCCLUSION: ICD-10-CM

## 2024-12-09 DIAGNOSIS — Z01.812 ENCOUNTER FOR PREPROCEDURAL LABORATORY EXAMINATION: ICD-10-CM

## 2024-12-09 DIAGNOSIS — Z09 POSTOP CHECK: Primary | ICD-10-CM

## 2024-12-09 LAB
LABORATORY COMMENT REPORT: NORMAL
PATH REPORT.FINAL DX SPEC: NORMAL
PATH REPORT.GROSS SPEC: NORMAL
PATH REPORT.RELEVANT HX SPEC: NORMAL
PATH REPORT.TOTAL CANCER: NORMAL
RESIDENT REVIEW: NORMAL

## 2024-12-09 PROCEDURE — 99024 POSTOP FOLLOW-UP VISIT: CPT

## 2024-12-09 PROCEDURE — 1036F TOBACCO NON-USER: CPT

## 2024-12-09 RX ORDER — DOXYCYCLINE 100 MG/1
100 CAPSULE ORAL 2 TIMES DAILY
Qty: 10 CAPSULE | Refills: 0 | Status: SHIPPED | OUTPATIENT
Start: 2024-12-09 | End: 2024-12-14

## 2024-12-10 PROCEDURE — RXMED WILLOW AMBULATORY MEDICATION CHARGE

## 2024-12-12 ENCOUNTER — PHARMACY VISIT (OUTPATIENT)
Dept: PHARMACY | Facility: CLINIC | Age: 34
End: 2024-12-12
Payer: MEDICAID

## 2024-12-12 PROCEDURE — RXOTC WILLOW AMBULATORY OTC CHARGE

## 2024-12-12 RX ORDER — TIMOLOL MALEATE 5 MG/ML
SOLUTION/ DROPS OPHTHALMIC
Qty: 10 ML | Refills: 5 | Status: CANCELLED | OUTPATIENT
Start: 2024-12-12 | End: 2025-12-12

## 2024-12-14 DIAGNOSIS — E11.9 TYPE 2 DIABETES MELLITUS WITHOUT COMPLICATION, WITHOUT LONG-TERM CURRENT USE OF INSULIN (MULTI): Primary | ICD-10-CM

## 2024-12-16 RX ORDER — TIMOLOL MALEATE 5 MG/ML
SOLUTION/ DROPS OPHTHALMIC
Qty: 10 ML | Refills: 5 | Status: SHIPPED | OUTPATIENT
Start: 2024-12-16 | End: 2025-12-16

## 2024-12-17 ENCOUNTER — APPOINTMENT (OUTPATIENT)
Dept: ENDOCRINOLOGY | Facility: CLINIC | Age: 34
End: 2024-12-17
Payer: COMMERCIAL

## 2024-12-17 ENCOUNTER — TELEPHONE (OUTPATIENT)
Dept: PRIMARY CARE | Facility: CLINIC | Age: 34
End: 2024-12-17

## 2024-12-17 ENCOUNTER — LAB (OUTPATIENT)
Dept: LAB | Facility: LAB | Age: 34
End: 2024-12-17
Payer: COMMERCIAL

## 2024-12-17 VITALS
WEIGHT: 124 LBS | HEIGHT: 58 IN | BODY MASS INDEX: 26.03 KG/M2 | SYSTOLIC BLOOD PRESSURE: 116 MMHG | DIASTOLIC BLOOD PRESSURE: 74 MMHG

## 2024-12-17 DIAGNOSIS — E11.65 TYPE 2 DIABETES MELLITUS WITH HYPERGLYCEMIA, WITHOUT LONG-TERM CURRENT USE OF INSULIN: ICD-10-CM

## 2024-12-17 DIAGNOSIS — E78.5 DYSLIPIDEMIA: ICD-10-CM

## 2024-12-17 DIAGNOSIS — E11.65 TYPE 2 DIABETES MELLITUS WITH HYPERGLYCEMIA, WITHOUT LONG-TERM CURRENT USE OF INSULIN: Primary | ICD-10-CM

## 2024-12-17 DIAGNOSIS — N80.9 ENDOMETRIOSIS: ICD-10-CM

## 2024-12-17 LAB
ANION GAP SERPL CALC-SCNC: 14 MMOL/L (ref 10–20)
BUN SERPL-MCNC: 15 MG/DL (ref 6–23)
C PEPTIDE SERPL-MCNC: 2.2 NG/ML (ref 0.7–3.9)
CALCIUM SERPL-MCNC: 9.8 MG/DL (ref 8.6–10.6)
CHLORIDE SERPL-SCNC: 103 MMOL/L (ref 98–107)
CO2 SERPL-SCNC: 25 MMOL/L (ref 21–32)
CREAT SERPL-MCNC: 0.69 MG/DL (ref 0.5–1.05)
CREAT UR-MCNC: 70.1 MG/DL (ref 20–320)
EGFRCR SERPLBLD CKD-EPI 2021: >90 ML/MIN/1.73M*2
EST. AVERAGE GLUCOSE BLD GHB EST-MCNC: 160 MG/DL
GLUCOSE SERPL-MCNC: 132 MG/DL (ref 74–99)
HBA1C MFR BLD: 7.2 %
MICROALBUMIN UR-MCNC: <7 MG/L
MICROALBUMIN/CREAT UR: NORMAL MG/G{CREAT}
POTASSIUM SERPL-SCNC: 4.1 MMOL/L (ref 3.5–5.3)
SODIUM SERPL-SCNC: 138 MMOL/L (ref 136–145)

## 2024-12-17 PROCEDURE — 36415 COLL VENOUS BLD VENIPUNCTURE: CPT

## 2024-12-17 PROCEDURE — 3008F BODY MASS INDEX DOCD: CPT | Performed by: INTERNAL MEDICINE

## 2024-12-17 PROCEDURE — 3078F DIAST BP <80 MM HG: CPT | Performed by: INTERNAL MEDICINE

## 2024-12-17 PROCEDURE — 80048 BASIC METABOLIC PNL TOTAL CA: CPT

## 2024-12-17 PROCEDURE — 82043 UR ALBUMIN QUANTITATIVE: CPT

## 2024-12-17 PROCEDURE — 83036 HEMOGLOBIN GLYCOSYLATED A1C: CPT

## 2024-12-17 PROCEDURE — 99205 OFFICE O/P NEW HI 60 MIN: CPT | Performed by: INTERNAL MEDICINE

## 2024-12-17 PROCEDURE — 3074F SYST BP LT 130 MM HG: CPT | Performed by: INTERNAL MEDICINE

## 2024-12-17 PROCEDURE — 82570 ASSAY OF URINE CREATININE: CPT

## 2024-12-17 PROCEDURE — 84681 ASSAY OF C-PEPTIDE: CPT

## 2024-12-17 RX ORDER — METFORMIN HYDROCHLORIDE 500 MG/1
1000 TABLET, EXTENDED RELEASE ORAL
Qty: 360 TABLET | Refills: 3 | Status: SHIPPED | OUTPATIENT
Start: 2024-12-17 | End: 2025-12-17

## 2024-12-17 NOTE — PROGRESS NOTES
Patient ID: Sarah Gallagher is a 34 y.o. female who presents for New Patient Visit (Endocrine consult. Referred by Dr. Ivett Crawford for her diabetes.).  HPI  The patient is referred for evaluation of type 2 diabetes.    This is a 34-year-old female who was diagnosed with type 2 diabetes in 2015.    4 years ago during pregnancy she was on insulin.  She delivered at 33 weeks and had a baby who was 4 pounds.    She has no complications from diabetes.    She is currently taking metformin  mg 1 tablet twice daily Jardiance 10 mg and Trulicity 4.5 mg.    She states in the past she was on Ozempic and have a hemoglobin A1c of 5.2% but it was not covered by insurance.    She is currently looking to get pregnant and is interested in an insulin pump in order to do so.    She had a hemoglobin A1c in September that was 7.5%.    Her blood sugars are generally mid 100s.    She has had no low blood sugars.    She does count carbohydrates.    She has a past history of glaucoma endometriosis hyperlipidemia ovarian cysts.    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    Family history negative for diabetes or thyroid.    ROS  Comprehensive review of systems is negative.    Objective   Physical Exam  There were no vitals taken for this visit.   Vitals:    12/17/24 0947   Weight: 56.2 kg (124 lb)      Body mass index is 25.92 kg/m².      Alert and oriented x3  In no distress  No focal neurologic deficits  No supraclavicular or dorsal fat  No purple striae  Integument intact    ENT normal. No adenopathy  Fundi normal  Thyroid palpable and normal. No nodules  Chest clear to auscultation  Heart sounds are normal  Abdomen nontender. Bowel sounds normal. No organomegaly  Feet are okay  Normal vibration and monofilament sensation normal pulses, no lesions    Current Outpatient Medications   Medication Sig Dispense Refill    blood sugar diagnostic (OneTouch Verio test strips) strip        blood sugar diagnostic  "(OneTouch Verio test strips) strip USE EVERY 12 HOURS AS DIRECTED 200 each 2    blood-glucose meter (OneTouch Verio Flex meter) misc Use as directed to check blood sugar 1 each 0    brimonidine (AlphaGAN) 0.2 % ophthalmic solution INSTILL 1 DROP IN BOTH EYES TWICE DAILY. 10 mL 5    dulaglutide (Trulicity) 4.5 mg/0.5 mL pen injector Inject 4.5 mg subcutaneously one time a week. 2 mL 11    empagliflozin (Jardiance) 10 mg Take 1 tablet by mouth daily with breakfast. 30 tablet 11    flash glucose sensor kit (FreeStyle Ricky 14 Day Sensor) kit USE AS DIRECTED 2 each 11    ibuprofen 600 mg tablet Take 1 tablet (600 mg) by mouth every 6 hours if needed for moderate pain (4 - 6) for up to 20 doses. 20 tablet 0    lancets (OneTouch Delica Plus Lancet) 30 gauge misc Use 1 strip 2 times a day. 200 each 2    latanoprost (Xalatan) 0.005 % ophthalmic solution INSTILL ONE DROP INTO BOTH EYES ONE TIME DAILY AT BEDTIME 7.5 mL 5    metFORMIN  mg 24 hr tablet Take 1 tablet by mouth two times a day with meals. 60 tablet 11    pen needle, diabetic 32 gauge x 5/32\" needle Use every day with Levemir 100 each 3    polyethylene glycol (Glycolax, Miralax) 17 gram packet Take 17 g by mouth once daily as needed (for constipation) for up to 10 doses. 10 packet 0    timolol (Timoptic) 0.5 % ophthalmic solution USE 1 DROP IN BOTH EYES TWICE DAILY. 10 mL 5     No current facility-administered medications for this visit.       Assessment/Plan     1.  Type 2 diabetes  2.  Hyperlipidemia  3.  Endometriosis    We discussed the pathophysiology of diabetes, insulin resistance and insulin insufficiency. We discussed risks for complications, goals for treatment, as well as options for treatment.    We discussed pregnancy.    We discussed safety of medication only trying to get pregnant but during pregnancy.    At present we will increase the metformin to 2 tablets twice daily.    We discussed insulin pump options including risk benefits and " alternatives.    She is interested in the tandem control IQ t:slim with Dexcom 7.    I have given her literature.    We discussed that ideal control both during conception and during pregnancy are lacy and that our best approach to this would be with the pump.    Will check C-peptide BMP hemoglobin A1c and urine microalbumin today.    Once she starts on the pump she is going to drop the Jardiance and the Trulicity.    She will follow-up closely with the high risk OB/GYN.    She will follow-up with me in 3 months sooner as needed.    I spent 60 minutes with this patient.  Greater than 50% of this time was spent in counseling and/or coordination of care.

## 2025-01-02 ENCOUNTER — TELEMEDICINE (OUTPATIENT)
Dept: ENDOCRINOLOGY | Facility: CLINIC | Age: 35
End: 2025-01-02
Payer: COMMERCIAL

## 2025-01-02 VITALS — WEIGHT: 125 LBS | BODY MASS INDEX: 26.24 KG/M2 | HEIGHT: 58 IN

## 2025-01-02 DIAGNOSIS — N80.9 ENDOMETRIOSIS: Primary | ICD-10-CM

## 2025-01-02 PROCEDURE — 3008F BODY MASS INDEX DOCD: CPT | Performed by: OBSTETRICS & GYNECOLOGY

## 2025-01-02 PROCEDURE — 1036F TOBACCO NON-USER: CPT | Performed by: OBSTETRICS & GYNECOLOGY

## 2025-01-02 PROCEDURE — 99024 POSTOP FOLLOW-UP VISIT: CPT | Performed by: OBSTETRICS & GYNECOLOGY

## 2025-01-02 ASSESSMENT — PAIN SCALES - GENERAL: PAINLEVEL_OUTOF10: 0-NO PAIN

## 2025-01-02 NOTE — PROGRESS NOTES
Virtual or Telephone Consent: A telephone visit (audio only) between the patient (at the originating site) and the provider (at the distant site) was utilized to provide this telehealth service    Interval history: She plans to TTC next month. Had one period that was very painful. She saw Dr Morris about her DM2. She wants to get on an insulin pump for possible future pregnancy. Recent A1c 7.2 two weeks ago. She is currently taking metformin  mg 1 tablet twice daily, Jardiance 10 mg, and Trulicity 4.5 mg.   Her bowel movements have been better since surgery. Incisions healing well. She has not yet TTC.     10/16/2024  Anti-Mullerian Hormone  0.176 - 11.705 ng/mL 3.367     Procedures and Anesthesia:  Procedure(s) and Anesthesia Type:     * Laparoscopic Excision of Endometriosis; Bilateral Chromopertubation - General     * Hysteroscopic polypectomy - General   Greater than one hour was spent lysing adhesions related to endometriosis.      Findings:   Hysteroscopy  - normal uterine cavity  - small polyp in right cornua (removed)  - bilateral tubal ostia visualized     Abdomen  - small keloid in previous 5mm umbilical trocar site (removed)     Laparoscopy [stage IV endometriosis]  - normal liver; appendix slightly dilated but overall normal in appearance  - small bowel adhered to right lateral uterus, cervix and bladder peritoneum  [After inspection of abdomen/pelvis, no inguinal hernia appreciated, CT findings likely due to this loop of adhesions; Dr Bui present and in agreement]  - Sigmoid colon adhered to left and right ovaries  - left fallopian tube adhered to left ovary and sigmoid colon; dilated but with spillage of blue chromopertubation dye after dissection  - right fallopian tube adhered to right ovary; not grossly abnormal; could not obtain spill and dye did not appear to enter the right cornu  - left ovary initially retroperitonealized  - right ovary adhered to lateral side wall, sigmoid colon and  right tube      Past History Reviewed and Affirmed Below:  Post Op Visit     Seen Sarah Gallagher 34 yr old female  s/p  Laparoscopic Excision of Endometriosis; Bilateral Chromopertubation, & Hysteroscopic polypectomy on 2024 with Dr. Hathaway - Greater than one hour was spent lysing adhesions related to endometriosis. Doing well.   4 abdominal Incisions are healing well: 1 umbilical, 1 LLQ, 1 left lateral: denies redness, swelling, or drainage. 1 RLQ: incision is slightly swollen- denies any redness/drainage. Glue intact to all incisions.   No issues with bowel or bladder. Last BM 4 days ago, urinating okay.   No nausea, vomiting, fever or chills  4/10 pain medications- taking Ibuprofen PRN- has not taken in 4 days.   No vaginal discharge or abnormal bleeding.      Vitals: There were no vitals taken for this visit.     Abdomen: soft, non-tender, all 4 incisions healing- denies redness or drainage- RLQ incision slightly swollen- will observe and come in for evaluation if develops any redness/drainage.      Prior labs:   CBC  Date: 2024  Plt: 355 (Ref range: 150 - 450 x10*3/uL)  Hct: 41.1 (Ref range: 36.0 - 46.0 %)     CMP  Date: 2024   BUN: 13 (Ref range: 6 - 23 mg/dL)  Cre: 0.78 (Ref range: 0.50 - 1.05 mg/dL)  AST: 39 (Ref range: 9 - 39 U/L)  ALT: 38 (Ref range: 7 - 45 U/L)     Assessment:   Normal post op, uncomplicated recovery     Plan:  Reviewed surgery report: Greater than one hour was spent lysing adhesions related to endometriosis.   Findings:   Hysteroscopy  - normal uterine cavity  - small polyp in right cornua (removed)  - bilateral tubal ostia visualized     Abdomen  - small keloid in previous 5mm umbilical trocar site (removed)     Laparoscopy [stage IV endometriosis]  - normal liver; appendix slightly dilated but overall normal in appearance  - small bowel adhered to right lateral uterus, cervix and bladder peritoneum  [After inspection of abdomen/pelvis, no inguinal hernia  appreciated, CT findings likely due to this loop of adhesions; Dr Bui present and in agreement]  - Sigmoid colon adhered to left and right ovaries  - left fallopian tube adhered to left ovary and sigmoid colon; dilated but with spillage of blue chromopertubation dye after dissection  - right fallopian tube adhered to right ovary; not grossly abnormal; could not obtain spill and dye did not appear to enter the right cornu- feels this may be due to tissue edema in the area of the tube  - left ovary initially retroperitonealized  - right ovary adhered to lateral side wall, sigmoid colon and right tube     Reviewed pathology report: pending  Start Colace 100 mg BID, start Miralax daily, add in Prune juice daily, can take MOM if needs to, increase water and fiber intake  Follow up 1-2-2025 with Dr. Hathaway  RTW letter adjusted for 12-, returned to work today and is not feeling well with pain.   Needs future HSG to assess right tubal status, left tube open- will call CD 1 to schedule- will start day before HSG- Doxycycline 100 mg BID x 5 days total- [facility]     Abi Crockett CNP 12/09/24 12:20 PM        Fertility Plan Update:  Continue to work to optimize blood sugars  High risk OB follow up - will see Dr Pittman  She prefers not to have an HSG to check on her other tube. We agree that even if it is not open, she does not want an HSG.   FUV in 3-4 months, can consider clomid or letrozole at that time if needed  Elsie Hathaway 01/02/25 4:19 PM

## 2025-01-03 DIAGNOSIS — E11.65 TYPE 2 DIABETES MELLITUS WITH HYPERGLYCEMIA, WITHOUT LONG-TERM CURRENT USE OF INSULIN: Primary | ICD-10-CM

## 2025-01-03 RX ORDER — INSULIN LISPRO 100 [IU]/ML
70 INJECTION, SOLUTION INTRAVENOUS; SUBCUTANEOUS DAILY
Qty: 63 ML | Refills: 3 | Status: SHIPPED | OUTPATIENT
Start: 2025-01-03 | End: 2026-01-03

## 2025-01-03 RX ORDER — INSULIN LISPRO 100 [IU]/ML
70 INJECTION, SOLUTION INTRAVENOUS; SUBCUTANEOUS DAILY
Qty: 63 ML | Refills: 3 | Status: CANCELLED | OUTPATIENT
Start: 2025-01-03 | End: 2026-01-03

## 2025-01-03 RX ORDER — ISOPROPYL ALCOHOL 70 ML/100ML
1 SWAB TOPICAL 4 TIMES DAILY
Qty: 400 EACH | Refills: 3 | Status: SHIPPED | OUTPATIENT
Start: 2025-01-03

## 2025-02-28 ENCOUNTER — HOSPITAL ENCOUNTER (OUTPATIENT)
Dept: RADIOLOGY | Facility: HOSPITAL | Age: 35
Discharge: HOME | End: 2025-02-28
Payer: COMMERCIAL

## 2025-02-28 ENCOUNTER — ANCILLARY PROCEDURE (OUTPATIENT)
Dept: ENDOCRINOLOGY | Facility: CLINIC | Age: 35
End: 2025-02-28
Payer: COMMERCIAL

## 2025-02-28 DIAGNOSIS — Z01.812 ENCOUNTER FOR PREPROCEDURAL LABORATORY EXAMINATION: Primary | ICD-10-CM

## 2025-02-28 DIAGNOSIS — N97.1 TUBAL OCCLUSION: ICD-10-CM

## 2025-02-28 LAB — PREGNANCY TEST URINE, POC: NEGATIVE

## 2025-02-28 PROCEDURE — 2550000001 HC RX 255 CONTRASTS

## 2025-02-28 PROCEDURE — 74740 X-RAY FEMALE GENITAL TRACT: CPT

## 2025-02-28 RX ADMIN — IOHEXOL 40 ML: 240 INJECTION, SOLUTION INTRATHECAL; INTRAVASCULAR; INTRAVENOUS; ORAL at 08:35

## 2025-02-28 NOTE — PROCEDURES
Hysterosalpingogram (HSG)    Date/Time: 2/28/2025 5:41 PM    Performed by: MIKAYLA Miguel  Authorized by: MIKAYLA Miguel    Consent:     Consent obtained:  Verbal and written    Consent given by:  Patient    Risks, benefits, and alternatives were discussed: yes      Risks discussed:  Bleeding, infection and pain  Universal protocol:     Procedure explained and questions answered to patient or proxy's satisfaction: yes      Relevant documents present and verified: yes      Test results available: yes      Imaging studies available: yes      Required blood products, implants, devices, and special equipment available: yes      Immediately prior to procedure, a time out was called: yes      Patient identity confirmed:  Verbally with patient, hospital-assigned identification number and arm band  Indications:     Indications:  Fertility testing  Pre-procedure details:     Skin preparation:  Povidone-iodine  Sedation:     Sedation type:  None  Anesthesia:     Anesthesia method:  None  Procedure specific details:      DANIELA Crockett CNP performed this procedure      Hysterosalpingogram (HSG) risks, benefits, alternatives, and personnel discussed with patient who agreed to proceed.    Procedural time out        Done in room where procedure done: Yes        Done just before starting procedure: Yes                                                 All members of procedural team involved in time-out: Yes                  Active communication used: Yes                                                         All team members agreed on procedure: Yes                                        Patient correctly identified by two identifiers: Yes                                  Correct side and site identified: Yes                                                     All needed special equipment/instruments available: Yes               Prior to the start of the procedure a time out was taken and the following were verified: The  identity of the patient using two patient identifiers.   Urine pregnancy test was performed and was negative.   Risks, benefits, and alternatives of the procedure were explained to the patient.  Informed consent was obtained.     The patient was placed in the dorsal lithotomy position and a sterile speculum was placed in the vagina. The cervix was sterilized with Betadine x 3. The anterior lip of the cervix was grasped with a single-tooth tenaculum. The acorn cannula was then placed in the cervix. The patient was positioned and images were taken with fluoroscopy as dye was inserted through the cannula. All instruments were then removed. The patient tolerated the procedure well and was discharged home the same day without complications.    Uterus:  normal contour without filling defects  Tubes:  Left fallopian tube not visualized and very small proximal portion of the right fallopian tube seen, no fill or spill noted.   Based on these findings, my recommendation is: Follow up required, chart forwarded to primary MD. Dr. Washington reviewed the images and agrees with the above findings. Patient has a FUV with Dr. Hathaway 4-3-2025 to discuss next steps. S/P Laparoscopy/Chromopertubation : both tubes adhered to ovaries and dissected away, after, left tube with spill noted and no spill from right.     The patient was counseled regarding the above findings and images were reviewed with patient at the time of the exam.     Abi Crockett CNP 02/28/25 5:43 PM       Post-procedure details:     Procedure completion:  Tolerated well, no immediate complications

## 2025-02-28 NOTE — PROGRESS NOTES
Patient presents for an HSG, POCT UPT negative today, see [facility] Radiology schedule for notation. Abi Crockett CNP 02/28/25 5:51 PM

## 2025-03-06 DIAGNOSIS — E11.9 TYPE 2 DIABETES MELLITUS WITHOUT COMPLICATION, WITHOUT LONG-TERM CURRENT USE OF INSULIN (MULTI): Primary | ICD-10-CM

## 2025-03-06 RX ORDER — INSULIN LISPRO 100 [IU]/ML
85 INJECTION, SOLUTION INTRAVENOUS; SUBCUTANEOUS DAILY
Qty: 76.5 ML | Refills: 3 | Status: SHIPPED | OUTPATIENT
Start: 2025-03-06 | End: 2026-03-06

## 2025-03-13 ENCOUNTER — PHARMACY VISIT (OUTPATIENT)
Dept: PHARMACY | Facility: CLINIC | Age: 35
End: 2025-03-13
Payer: MEDICAID

## 2025-03-18 ENCOUNTER — OFFICE VISIT (OUTPATIENT)
Dept: ORTHOPEDIC SURGERY | Facility: CLINIC | Age: 35
End: 2025-03-18
Payer: COMMERCIAL

## 2025-03-18 ENCOUNTER — HOSPITAL ENCOUNTER (OUTPATIENT)
Dept: RADIOLOGY | Facility: CLINIC | Age: 35
Discharge: HOME | End: 2025-03-18
Payer: COMMERCIAL

## 2025-03-18 DIAGNOSIS — M25.551 RIGHT HIP PAIN: ICD-10-CM

## 2025-03-18 DIAGNOSIS — M76.891 RIGHT HIP TENDINITIS: ICD-10-CM

## 2025-03-18 DIAGNOSIS — M25.561 RIGHT KNEE PAIN, UNSPECIFIED CHRONICITY: ICD-10-CM

## 2025-03-18 PROCEDURE — 99204 OFFICE O/P NEW MOD 45 MIN: CPT | Performed by: ORTHOPAEDIC SURGERY

## 2025-03-18 PROCEDURE — 73502 X-RAY EXAM HIP UNI 2-3 VIEWS: CPT | Mod: RIGHT SIDE | Performed by: RADIOLOGY

## 2025-03-18 PROCEDURE — 99214 OFFICE O/P EST MOD 30 MIN: CPT | Performed by: ORTHOPAEDIC SURGERY

## 2025-03-18 PROCEDURE — 73562 X-RAY EXAM OF KNEE 3: CPT | Mod: RT

## 2025-03-18 PROCEDURE — 1036F TOBACCO NON-USER: CPT | Performed by: ORTHOPAEDIC SURGERY

## 2025-03-18 PROCEDURE — 73562 X-RAY EXAM OF KNEE 3: CPT | Mod: RIGHT SIDE | Performed by: RADIOLOGY

## 2025-03-18 PROCEDURE — 73502 X-RAY EXAM HIP UNI 2-3 VIEWS: CPT | Mod: RT
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
## 2025-04-01 ENCOUNTER — APPOINTMENT (OUTPATIENT)
Dept: PRIMARY CARE | Facility: CLINIC | Age: 35
End: 2025-04-01
Payer: COMMERCIAL

## 2025-04-03 ENCOUNTER — TELEMEDICINE (OUTPATIENT)
Dept: ENDOCRINOLOGY | Facility: CLINIC | Age: 35
End: 2025-04-03
Payer: COMMERCIAL

## 2025-04-03 ENCOUNTER — PREP FOR PROCEDURE (OUTPATIENT)
Dept: ENDOCRINOLOGY | Facility: CLINIC | Age: 35
End: 2025-04-03
Payer: COMMERCIAL

## 2025-04-03 VITALS — BODY MASS INDEX: 26.66 KG/M2 | WEIGHT: 127 LBS | HEIGHT: 58 IN

## 2025-04-03 DIAGNOSIS — N93.9 ABNORMAL UTERINE BLEEDING: Primary | ICD-10-CM

## 2025-04-03 DIAGNOSIS — N80.9 ENDOMETRIOSIS: ICD-10-CM

## 2025-04-03 DIAGNOSIS — N70.11 HYDROSALPINX: ICD-10-CM

## 2025-04-03 DIAGNOSIS — N70.11 HYDROSALPINX: Primary | ICD-10-CM

## 2025-04-03 PROCEDURE — 3008F BODY MASS INDEX DOCD: CPT | Performed by: OBSTETRICS & GYNECOLOGY

## 2025-04-03 PROCEDURE — 1036F TOBACCO NON-USER: CPT | Performed by: OBSTETRICS & GYNECOLOGY

## 2025-04-03 PROCEDURE — 99212 OFFICE O/P EST SF 10 MIN: CPT | Performed by: OBSTETRICS & GYNECOLOGY

## 2025-04-03 RX ORDER — CELECOXIB 400 MG/1
400 CAPSULE ORAL ONCE
OUTPATIENT
Start: 2025-04-03 | End: 2025-04-03

## 2025-04-03 RX ORDER — GABAPENTIN 600 MG/1
600 TABLET ORAL ONCE
OUTPATIENT
Start: 2025-04-03 | End: 2025-04-03

## 2025-04-03 RX ORDER — INSULIN LISPRO 100 [IU]/ML
INJECTION, SUSPENSION SUBCUTANEOUS
COMMUNITY

## 2025-04-03 RX ORDER — ACETAMINOPHEN 325 MG/1
975 TABLET ORAL ONCE
OUTPATIENT
Start: 2025-04-03 | End: 2025-04-03

## 2025-04-03 ASSESSMENT — PAIN SCALES - GENERAL: PAINLEVEL_OUTOF10: 0-NO PAIN

## 2025-04-03 NOTE — PROGRESS NOTES
Virtual or Telephone Consent: An interactive audio and video telecommunication system which permits real time communications between the patient (at the originating site) and provider (at the distant site) was utilized to provide this telehealth service    Interval history: Here to review her recent HSG report, which indicates that her tubes are possible re-occluded. Surgery was 11/24 and she has been TTC since the surgery. Cycles are 28 days. She has tried an ovulation kit but is not sure she is doing it right. She does have pelvic cramping with ovulation on the side she believes she is ovulating from.     HSG report:  Uterus:  normal contour without filling defects  Tubes:  Left fallopian tube not visualized and very small proximal portion of the right fallopian tube seen, no fill or spill noted.   Based on these findings, my recommendation is: Follow up required, chart forwarded to primary MD. Dr. Washington reviewed the images and agrees with the above findings. Patient has a FUV with Dr. Hathaway 4-3-2025 to discuss next steps. S/P Laparoscopy/Chromopertubation : both tubes adhered to ovaries and dissected away, after, left tube with spill noted and no spill from right.     OP NOTE REVIEWED:  Laparoscopy [stage IV endometriosis]  - normal liver; appendix slightly dilated but overall normal in appearance  - small bowel adhered to right lateral uterus, cervix and bladder peritoneum  [After inspection of abdomen/pelvis, no inguinal hernia appreciated, CT findings likely due to this loop of adhesions; Dr Bui present and in agreement]  - Sigmoid colon adhered to left and right ovaries  - left fallopian tube adhered to left ovary and sigmoid colon; dilated but with spillage of blue chromopertubation dye after dissection  - right fallopian tube adhered to right ovary; not grossly abnormal; could not obtain spill and dye did not appear to enter the right cornu  - left ovary initially retroperitonealized  - right  ovary adhered to lateral side wall, sigmoid colon and right tube    Past History Reviewed and Affirmed Below:  History of present illness: aSrah Gallagher is a 34 y.o. female   who presents with concerns regarding hydrosalpinges and a hx of endometriosis based on dysmenorrhea- surgery performed by Dr. Vann in 2023 and showed left hydrosalpinx and spill of dye through right tube, also had a right ovarian cystectomy of endometrioma and CO2 laser treatment. This resulted in short term relief and now she has pain recurrence.   She has a 3 year old daughter who was delivered prematurely at 33 weeks. , NICU x 23 days.  Conceived after ozempic use (A1c was 5).   She has DM2 diagnosed in . She takes trulicity (dulaglutide) weekly. A1c is at 7.7. She has recently increased her dose. She has glaucoma since age 19. This is managed by Dr. Ivett Crawford. She is no longer taking metformin due to high dose.     Past Medical History:   Diagnosis Date    Abnormal uterine bleeding (AUB)     Plan: Laparoscopic Excision of Endometriosis; Bilateral Chromopertubation;   Possible Bilateral Ovarian Cystectomy;   Possible Left Salpingectomy;   Hysteroscopy 26    Bell's palsy     Chondromalacia, patella, right     s/p KNEE SCOPE,MENISECTOMY,MED OR LAT    KNEE SCOPE,SHAVE ARTICULAR CART    ARTHRS KNEE W LAT RELEASE    ARTHROSCOPY KNEE MENISCECTOMY MEDIAL OR LATERAL    CHONDROPLASTY KNEE    ARTHROSCOPY KNEE, SURGICAL, LATERAL RELEASE   19    Chronic pelvic pain in female     Endometrioma     Endometriosis     Femoral hernia of right side     Glaucoma     Hydrosalpinx     Hyperlipidemia     Obesity     Type 2 diabetes mellitus     24 7.7%    Vitamin D deficiency      Past Surgical History:   Procedure Laterality Date    KNEE Left     knee scope    MENISCECTOMY Right 2019    KNEE SCOPE,MENISECTOMY,MED OR LAT    KNEE SCOPE,SHAVE ARTICULAR CART    ARTHRS KNEE W LAT RELEASE    ARTHROSCOPY KNEE  "MENISCECTOMY MEDIAL OR LATERAL    CHONDROPLASTY KNEE    ARTHROSCOPY KNEE, SURGICAL, LATERAL RELEASE     Current Outpatient Medications   Medication Instructions    alcohol swabs pads, medicated 1 Swab, topical (top), 4 times daily, Use 4 wipes/swabs daily.    blood sugar diagnostic (OneTouch Verio test strips) strip      blood sugar diagnostic (OneTouch Verio test strips) strip USE EVERY 12 HOURS AS DIRECTED    blood-glucose meter (OneTouch Verio Flex meter) misc Use as directed to check blood sugar    brimonidine (AlphaGAN) 0.2 % ophthalmic solution INSTILL 1 DROP IN BOTH EYES TWICE DAILY.    dulaglutide (Trulicity) 4.5 mg/0.5 mL pen injector Inject 4.5 mg subcutaneously one time a week.    empagliflozin (Jardiance) 10 mg Take 1 tablet by mouth daily with breakfast.    flash glucose sensor kit (FreeStyle Ricky 14 Day Sensor) kit USE AS DIRECTED    ibuprofen 600 mg, oral, Every 6 hours PRN    insulin lispro protamin-lispro (HumaLOG Mix 50-50 KwikPen) 100 unit/mL (50-50) pen subcutaneous, 2 times daily (morning and late afternoon), Take as directed per insulin instructions.    insulin lispro 70 Units, subcutaneous, Daily, Take as directed per insulin instructions.    insulin lispro 85 Units, subcutaneous, Daily, Take as directed per insulin instructions. On a pump    latanoprost (Xalatan) 0.005 % ophthalmic solution INSTILL ONE DROP INTO BOTH EYES ONE TIME DAILY AT BEDTIME    metFORMIN XR (GLUCOPHAGE-XR) 1,000 mg, oral, 2 times daily (morning and late afternoon)    pen needle, diabetic 32 gauge x 5/32\" needle Use every day with Levemir    polyethylene glycol (GLYCOLAX, MIRALAX) 17 g, oral, Daily PRN    timolol (Timoptic) 0.5 % ophthalmic solution USE 1 DROP IN BOTH EYES TWICE DAILY.     Allergies   Allergen Reactions    Morphine GI Upset and Nausea/vomiting       Assessment  Sarah Gallagher is a 34 y.o. female,   with advanced endometriosis relapse and left sided hydrosalpinx s/p tubolysis with spill achieved " at end of case (now HSG with possible bilateral occlusion), AUB / dysmenorrhea    Plan  - Planned procedure: HSG under anesthesia, may be tubal spasm  - Will monitor cycles and let us know if she has   - Will check progesterone levels to ensure she is ovulating  - Check CD 21 prog to confirm ovulation as she has concerns about this. Orders placed- she will stop by the lab as she is in an ideal window. She is open to clomid/letrozole. She is open to doing SO. Could be INVOCELL vineet candidate.    Elsie Hathaway 04/03/25 3:38 PM

## 2025-04-07 ENCOUNTER — TELEPHONE (OUTPATIENT)
Dept: PRIMARY CARE | Facility: CLINIC | Age: 35
End: 2025-04-07
Payer: COMMERCIAL

## 2025-04-07 ENCOUNTER — TELEPHONE (OUTPATIENT)
Dept: ENDOCRINOLOGY | Facility: CLINIC | Age: 35
End: 2025-04-07
Payer: COMMERCIAL

## 2025-04-07 DIAGNOSIS — N97.9 FEMALE INFERTILITY: ICD-10-CM

## 2025-04-07 NOTE — TELEPHONE ENCOUNTER
States her BS are over 330, has been for about a month. Says she's eating less, smaller portions, hasn't been sick, no changes, does change the location/site of pump, please advise.

## 2025-04-07 NOTE — TELEPHONE ENCOUNTER
Telephone call returned to patient. Patient LMP 3/15/25 and is expecting her next menses in the next week or so. Plan per Dr. Hathaway to obtain CD 21 P4 to assess ovulation. As patient is CD 24 today, she will call with next menses to make a plan for when to go for progesterone level.   Patient aware if menses occurs on a weekend to call us on Monday.    04/07/25 at 3:36 PM - Kaykay Dorado RN

## 2025-04-07 NOTE — TELEPHONE ENCOUNTER
Reason for call: Patient will start her cycle in a week calling to check with the nurse when she has to do her blood work please call her.  Notes:

## 2025-04-07 NOTE — TELEPHONE ENCOUNTER
It is likely because she is off of the Jardiance and Trulicity.  She needs to increase her insulin.  I need to know what her basal and bolus rates are.

## 2025-04-08 PROBLEM — N70.11 HYDROSALPINX: Status: ACTIVE | Noted: 2025-04-03

## 2025-04-14 ENCOUNTER — TELEPHONE (OUTPATIENT)
Dept: ENDOCRINOLOGY | Facility: CLINIC | Age: 35
End: 2025-04-14
Payer: COMMERCIAL

## 2025-04-14 DIAGNOSIS — N97.9 FEMALE INFERTILITY: ICD-10-CM

## 2025-04-14 NOTE — TELEPHONE ENCOUNTER
Reason for call: reporting start of cycle  LMP: 4/14  Treatment type: IVF  Note: Pt stated she needed to confirm when to have her P4 checked.

## 2025-04-14 NOTE — TELEPHONE ENCOUNTER
LVM with patient to get CD 22 p4 level on 5/5. Placed patient in noon huddle for this date. Plan per Dr. Hathaway's consult note was to check if patient is ovulating on her own (not taking any OI meds this cycle).    Jerilyn Alford 04/14/25 10:45 AM

## 2025-04-14 NOTE — TELEPHONE ENCOUNTER
Returned patient's call. Patient aware to go to a lab on 5/5 to check progesterone level to confirm ovulation.   No further questions.     Jerilyn Alford 04/14/25 10:57 AM

## 2025-04-22 ENCOUNTER — APPOINTMENT (OUTPATIENT)
Dept: ENDOCRINOLOGY | Facility: CLINIC | Age: 35
End: 2025-04-22
Payer: COMMERCIAL

## 2025-04-22 ENCOUNTER — TELEPHONE (OUTPATIENT)
Dept: ENDOCRINOLOGY | Facility: CLINIC | Age: 35
End: 2025-04-22

## 2025-04-22 ENCOUNTER — LAB REQUISITION (OUTPATIENT)
Dept: LAB | Facility: HOSPITAL | Age: 35
End: 2025-04-22

## 2025-04-22 VITALS — WEIGHT: 151 LBS | SYSTOLIC BLOOD PRESSURE: 122 MMHG | DIASTOLIC BLOOD PRESSURE: 74 MMHG | BODY MASS INDEX: 31.56 KG/M2

## 2025-04-22 DIAGNOSIS — N97.9 FEMALE INFERTILITY: Primary | ICD-10-CM

## 2025-04-22 DIAGNOSIS — E11.65 TYPE 2 DIABETES MELLITUS WITH HYPERGLYCEMIA, WITHOUT LONG-TERM CURRENT USE OF INSULIN: Primary | ICD-10-CM

## 2025-04-22 DIAGNOSIS — E78.5 DYSLIPIDEMIA: ICD-10-CM

## 2025-04-22 DIAGNOSIS — N80.9 ENDOMETRIOSIS: ICD-10-CM

## 2025-04-22 DIAGNOSIS — N97.9 FEMALE INFERTILITY, UNSPECIFIED: ICD-10-CM

## 2025-04-22 LAB
ERYTHROCYTE [DISTWIDTH] IN BLOOD BY AUTOMATED COUNT: 16 % (ref 11.5–14.5)
HCT VFR BLD AUTO: 37.1 % (ref 36–46)
HGB BLD-MCNC: 12.2 G/DL (ref 12–16)
MCH RBC QN AUTO: 26.3 PG (ref 26–34)
MCHC RBC AUTO-ENTMCNC: 32.9 G/DL (ref 32–36)
MCV RBC AUTO: 80 FL (ref 80–100)
NRBC BLD-RTO: 0 /100 WBCS (ref 0–0)
PLATELET # BLD AUTO: 291 X10*3/UL (ref 150–450)
POC HEMOGLOBIN A1C: 6 % (ref 4.2–6.5)
RBC # BLD AUTO: 4.64 X10*6/UL (ref 4–5.2)
WBC # BLD AUTO: 5.3 X10*3/UL (ref 4.4–11.3)

## 2025-04-22 PROCEDURE — 83036 HEMOGLOBIN GLYCOSYLATED A1C: CPT | Performed by: INTERNAL MEDICINE

## 2025-04-22 PROCEDURE — 3044F HG A1C LEVEL LT 7.0%: CPT | Performed by: INTERNAL MEDICINE

## 2025-04-22 PROCEDURE — 85027 COMPLETE CBC AUTOMATED: CPT

## 2025-04-22 PROCEDURE — 3074F SYST BP LT 130 MM HG: CPT | Performed by: INTERNAL MEDICINE

## 2025-04-22 PROCEDURE — RXMED WILLOW AMBULATORY MEDICATION CHARGE

## 2025-04-22 PROCEDURE — 99214 OFFICE O/P EST MOD 30 MIN: CPT | Performed by: INTERNAL MEDICINE

## 2025-04-22 PROCEDURE — 3078F DIAST BP <80 MM HG: CPT | Performed by: INTERNAL MEDICINE

## 2025-04-22 RX ORDER — METFORMIN HYDROCHLORIDE 500 MG/1
500 TABLET, EXTENDED RELEASE ORAL
Qty: 180 TABLET | Refills: 3 | Status: SHIPPED | OUTPATIENT
Start: 2025-04-22 | End: 2026-04-22

## 2025-04-22 NOTE — TELEPHONE ENCOUNTER
Reason for call:   Notes: Patient went to the lab this morning because she is having HSG under anesthesia  on 4/25. When she got to the lab she was informed there hasn't been any orders placed. Can some one reach out to patient  and confirm if she needs lab

## 2025-04-22 NOTE — PROGRESS NOTES
Patient ID: Sarah Gallagher is a 34 y.o. female who presents for Follow-up.  HPI  The patient comes in for follow up.    She has type 2 diabetes on insulin.    She is not looking to get pregnant.    At the last appointment we stopped Jardiance 10 mg Trulicity 4.5 mg.    She was supposed to stay on metformin  mg twice daily but stopped it.    She is now using the tandem control IQ pump with Dexcom 7.    Her mornings tend to be around 100 post meals she is up to 300.    She has had no low blood sugars.    Physically she has no other complaints.    ROS  Comprehensive review of systems is negative.    Objective   Physical Exam  Visit Vitals  /74      Vitals:    04/22/25 0834   Weight: 68.5 kg (151 lb)      Body mass index is 31.56 kg/m².      Weight 151 up 17 pounds    ENT normal. No adenopathy  Fundi normal  Thyroid palpable and normal. No nodules  Chest clear to auscultation  Heart sounds are normal  Abdomen nontender. Bowel sounds normal. No organomegaly  Feet are okay  Normal vibration and monofilament sensation normal pulses, no lesions    Current Medications[1]    Assessment/Plan     1.  Type 2 diabetes on insulin  2.  Hyperlipidemia  3.  Endometriosis    Will check hemoglobin A1c by fingerstick today.    She will resume the metformin.    She will touch base with the tandem people about the reservoir and adjusting her bolus.    We also discussed that if her sugars at bedtime are lower she may need to cut back on the basal rate.    We also discussed scott phenomenon.    She will follow-up with me in 3 months sooner as needed.             [1]   Current Outpatient Medications   Medication Sig Dispense Refill    alcohol swabs pads, medicated Apply 1 Swab topically 4 times a day. Use 4 wipes/swabs daily. 400 each 3    blood sugar diagnostic (OneTouch Verio test strips) strip        blood sugar diagnostic (OneTouch Verio test strips) strip USE EVERY 12 HOURS AS DIRECTED 200 each 2    blood-glucose meter  "(OneTouch Verio Flex meter) misc Use as directed to check blood sugar 1 each 0    brimonidine (AlphaGAN) 0.2 % ophthalmic solution INSTILL 1 DROP IN BOTH EYES TWICE DAILY. 10 mL 5    dulaglutide (Trulicity) 4.5 mg/0.5 mL pen injector Inject 4.5 mg subcutaneously one time a week. 2 mL 11    empagliflozin (Jardiance) 10 mg Take 1 tablet by mouth daily with breakfast. 30 tablet 11    flash glucose sensor kit (FreeStyle Ricky 14 Day Sensor) kit USE AS DIRECTED 2 each 11    ibuprofen 600 mg tablet Take 1 tablet (600 mg) by mouth every 6 hours if needed for moderate pain (4 - 6) for up to 20 doses. 20 tablet 0    insulin lispro 100 unit/mL injection Inject 70 Units under the skin once daily. Take as directed per insulin instructions. 63 mL 3    insulin lispro 100 unit/mL injection Inject 85 Units under the skin once daily. Take as directed per insulin instructions. On a pump 76.5 mL 3    insulin lispro protamin-lispro (HumaLOG Mix 50-50 KwikPen) 100 unit/mL (50-50) pen Inject under the skin 2 times daily (morning and late afternoon). Take as directed per insulin instructions.      latanoprost (Xalatan) 0.005 % ophthalmic solution INSTILL ONE DROP INTO BOTH EYES ONE TIME DAILY AT BEDTIME 7.5 mL 5    metFORMIN  mg 24 hr tablet Take 2 tablets (1,000 mg) by mouth 2 times daily (morning and late afternoon). 360 tablet 3    pen needle, diabetic 32 gauge x 5/32\" needle Use every day with Levemir 100 each 3    polyethylene glycol (Glycolax, Miralax) 17 gram packet Take 17 g by mouth once daily as needed (for constipation) for up to 10 doses. 10 packet 0    timolol (Timoptic) 0.5 % ophthalmic solution USE 1 DROP IN BOTH EYES TWICE DAILY. 10 mL 5     No current facility-administered medications for this visit.     "

## 2025-04-22 NOTE — TELEPHONE ENCOUNTER
See my chart    Sincerely,  Mackenzie Harding RN    PLEASE NOTE: My Chart messages are not always checked frequently- there are times that these might not be reviewed or responded to for several days! If you have a time sensitive question, please call either the [place] office ([phone]) or the [place] office ([phone]) during daytime hours.

## 2025-04-24 ENCOUNTER — PHARMACY VISIT (OUTPATIENT)
Dept: PHARMACY | Facility: CLINIC | Age: 35
End: 2025-04-24
Payer: MEDICAID

## 2025-04-24 NOTE — H&P
History Of Present Illness  Sarah Gallagher is a 34 y.o. female presenting for HSG under anesthesia     Past Medical History  Medical History[1]    Surgical History  Surgical History[2]     Social History  She reports that she has never smoked. She has never been exposed to tobacco smoke. She has never used smokeless tobacco. She reports that she does not drink alcohol and does not use drugs.    Family History  Family History[3]     Allergies  Morphine    Review of Systems     Physical Exam     Last Recorded Vitals  Last menstrual period 03/05/2025.    Relevant Results         Assessment & Plan  Hydrosalpinx      Planned procedure: HSG under anesthesia      Rivka Bradshaw MD         [1]   Past Medical History:  Diagnosis Date    Abnormal uterine bleeding (AUB)     Plan: Laparoscopic Excision of Endometriosis; Bilateral Chromopertubation;   Possible Bilateral Ovarian Cystectomy;   Possible Left Salpingectomy;   Hysteroscopy 11/22/24    Bell's palsy     Chondromalacia, patella, right     s/p KNEE SCOPE,MENISECTOMY,MED OR LAT    KNEE SCOPE,SHAVE ARTICULAR CART    ARTHRS KNEE W LAT RELEASE    ARTHROSCOPY KNEE MENISCECTOMY MEDIAL OR LATERAL    CHONDROPLASTY KNEE    ARTHROSCOPY KNEE, SURGICAL, LATERAL RELEASE   5/24/19    Chronic pelvic pain in female     Endometrioma     Endometriosis     Femoral hernia of right side     Glaucoma     Hydrosalpinx     Hyperlipidemia     Obesity     Type 2 diabetes mellitus     4/12/24 7.7%    Vitamin D deficiency    [2]   Past Surgical History:  Procedure Laterality Date    KNEE Left     knee scope    MENISCECTOMY Right 05/24/2019    KNEE SCOPE,MENISECTOMY,MED OR LAT    KNEE SCOPE,SHAVE ARTICULAR CART    ARTHRS KNEE W LAT RELEASE    ARTHROSCOPY KNEE MENISCECTOMY MEDIAL OR LATERAL    CHONDROPLASTY KNEE    ARTHROSCOPY KNEE, SURGICAL, LATERAL RELEASE   [3]   Family History  Problem Relation Name Age of Onset    No Known Problems Mother      No Known Problems Father      Lung cancer  Other grand mother

## 2025-04-25 ENCOUNTER — APPOINTMENT (OUTPATIENT)
Dept: RADIOLOGY | Facility: HOSPITAL | Age: 35
End: 2025-04-25
Payer: COMMERCIAL

## 2025-04-25 ENCOUNTER — PHARMACY VISIT (OUTPATIENT)
Dept: PHARMACY | Facility: CLINIC | Age: 35
End: 2025-04-25
Payer: MEDICAID

## 2025-04-25 ENCOUNTER — HOSPITAL ENCOUNTER (OUTPATIENT)
Facility: HOSPITAL | Age: 35
Setting detail: OUTPATIENT SURGERY
Discharge: HOME | End: 2025-04-25
Attending: OBSTETRICS & GYNECOLOGY | Admitting: OBSTETRICS & GYNECOLOGY
Payer: COMMERCIAL

## 2025-04-25 ENCOUNTER — ANESTHESIA (OUTPATIENT)
Dept: OPERATING ROOM | Facility: HOSPITAL | Age: 35
End: 2025-04-25
Payer: COMMERCIAL

## 2025-04-25 ENCOUNTER — ANESTHESIA EVENT (OUTPATIENT)
Dept: OPERATING ROOM | Facility: HOSPITAL | Age: 35
End: 2025-04-25
Payer: COMMERCIAL

## 2025-04-25 VITALS
BODY MASS INDEX: 32.16 KG/M2 | OXYGEN SATURATION: 98 % | HEART RATE: 85 BPM | DIASTOLIC BLOOD PRESSURE: 66 MMHG | HEIGHT: 58 IN | TEMPERATURE: 97.7 F | RESPIRATION RATE: 18 BRPM | SYSTOLIC BLOOD PRESSURE: 114 MMHG | WEIGHT: 153.22 LBS

## 2025-04-25 DIAGNOSIS — E11.9 TYPE 2 DIABETES MELLITUS WITHOUT COMPLICATION, WITHOUT LONG-TERM CURRENT USE OF INSULIN: ICD-10-CM

## 2025-04-25 DIAGNOSIS — N70.11 HYDROSALPINX: Primary | ICD-10-CM

## 2025-04-25 DIAGNOSIS — Z98.890 POST-OPERATIVE STATE: ICD-10-CM

## 2025-04-25 DIAGNOSIS — N80.9 ENDOMETRIOSIS: ICD-10-CM

## 2025-04-25 LAB
GLUCOSE BLD MANUAL STRIP-MCNC: 184 MG/DL (ref 74–99)
GLUCOSE BLD MANUAL STRIP-MCNC: 199 MG/DL (ref 74–99)

## 2025-04-25 PROCEDURE — 74740 X-RAY FEMALE GENITAL TRACT: CPT | Performed by: OBSTETRICS & GYNECOLOGY

## 2025-04-25 PROCEDURE — 82947 ASSAY GLUCOSE BLOOD QUANT: CPT | Mod: 91

## 2025-04-25 PROCEDURE — 3700000001 HC GENERAL ANESTHESIA TIME - INITIAL BASE CHARGE: Performed by: OBSTETRICS & GYNECOLOGY

## 2025-04-25 PROCEDURE — 58340 CATHETER FOR HYSTEROGRAPHY: CPT | Performed by: OBSTETRICS & GYNECOLOGY

## 2025-04-25 PROCEDURE — 3700000002 HC GENERAL ANESTHESIA TIME - EACH INCREMENTAL 1 MINUTE: Performed by: OBSTETRICS & GYNECOLOGY

## 2025-04-25 PROCEDURE — 2500000005 HC RX 250 GENERAL PHARMACY W/O HCPCS: Performed by: ANESTHESIOLOGY

## 2025-04-25 PROCEDURE — 2720000007 HC OR 272 NO HCPCS: Performed by: OBSTETRICS & GYNECOLOGY

## 2025-04-25 PROCEDURE — 2550000001 HC RX 255 CONTRASTS: Performed by: OBSTETRICS & GYNECOLOGY

## 2025-04-25 PROCEDURE — 2500000005 HC RX 250 GENERAL PHARMACY W/O HCPCS: Mod: JZ | Performed by: OBSTETRICS & GYNECOLOGY

## 2025-04-25 PROCEDURE — RXMED WILLOW AMBULATORY MEDICATION CHARGE

## 2025-04-25 PROCEDURE — 7100000010 HC PHASE TWO TIME - EACH INCREMENTAL 1 MINUTE: Performed by: OBSTETRICS & GYNECOLOGY

## 2025-04-25 PROCEDURE — 7100000009 HC PHASE TWO TIME - INITIAL BASE CHARGE: Performed by: OBSTETRICS & GYNECOLOGY

## 2025-04-25 PROCEDURE — 2500000004 HC RX 250 GENERAL PHARMACY W/ HCPCS (ALT 636 FOR OP/ED)

## 2025-04-25 PROCEDURE — 76000 FLUOROSCOPY <1 HR PHYS/QHP: CPT

## 2025-04-25 PROCEDURE — 7100000002 HC RECOVERY ROOM TIME - EACH INCREMENTAL 1 MINUTE: Performed by: OBSTETRICS & GYNECOLOGY

## 2025-04-25 PROCEDURE — 3600000008 HC OR TIME - EACH INCREMENTAL 1 MINUTE - PROCEDURE LEVEL THREE: Performed by: OBSTETRICS & GYNECOLOGY

## 2025-04-25 PROCEDURE — 3600000003 HC OR TIME - INITIAL BASE CHARGE - PROCEDURE LEVEL THREE: Performed by: OBSTETRICS & GYNECOLOGY

## 2025-04-25 PROCEDURE — 2500000001 HC RX 250 WO HCPCS SELF ADMINISTERED DRUGS (ALT 637 FOR MEDICARE OP): Performed by: OBSTETRICS & GYNECOLOGY

## 2025-04-25 PROCEDURE — 7100000001 HC RECOVERY ROOM TIME - INITIAL BASE CHARGE: Performed by: OBSTETRICS & GYNECOLOGY

## 2025-04-25 RX ORDER — DOXYCYCLINE 100 MG/10ML
INJECTION, POWDER, LYOPHILIZED, FOR SOLUTION INTRAVENOUS AS NEEDED
Status: DISCONTINUED | OUTPATIENT
Start: 2025-04-25 | End: 2025-04-25

## 2025-04-25 RX ORDER — METOCLOPRAMIDE HYDROCHLORIDE 5 MG/ML
10 INJECTION INTRAMUSCULAR; INTRAVENOUS ONCE AS NEEDED
Status: DISCONTINUED | OUTPATIENT
Start: 2025-04-25 | End: 2025-04-25 | Stop reason: HOSPADM

## 2025-04-25 RX ORDER — KETOROLAC TROMETHAMINE 30 MG/ML
INJECTION, SOLUTION INTRAMUSCULAR; INTRAVENOUS AS NEEDED
Status: DISCONTINUED | OUTPATIENT
Start: 2025-04-25 | End: 2025-04-25

## 2025-04-25 RX ORDER — LIDOCAINE HYDROCHLORIDE 10 MG/ML
0.1 INJECTION, SOLUTION EPIDURAL; INFILTRATION; INTRACAUDAL; PERINEURAL ONCE
Status: DISCONTINUED | OUTPATIENT
Start: 2025-04-25 | End: 2025-04-25 | Stop reason: HOSPADM

## 2025-04-25 RX ORDER — CELECOXIB 200 MG/1
400 CAPSULE ORAL ONCE
Status: COMPLETED | OUTPATIENT
Start: 2025-04-25 | End: 2025-04-25

## 2025-04-25 RX ORDER — ONDANSETRON HYDROCHLORIDE 2 MG/ML
INJECTION, SOLUTION INTRAVENOUS AS NEEDED
Status: DISCONTINUED | OUTPATIENT
Start: 2025-04-25 | End: 2025-04-25

## 2025-04-25 RX ORDER — FENTANYL CITRATE 50 UG/ML
50 INJECTION, SOLUTION INTRAMUSCULAR; INTRAVENOUS EVERY 5 MIN PRN
Status: DISCONTINUED | OUTPATIENT
Start: 2025-04-25 | End: 2025-04-25 | Stop reason: HOSPADM

## 2025-04-25 RX ORDER — ACETAMINOPHEN 325 MG/1
650 TABLET ORAL EVERY 6 HOURS PRN
Qty: 20 TABLET | Refills: 0 | Status: SHIPPED | OUTPATIENT
Start: 2025-04-25

## 2025-04-25 RX ORDER — FENTANYL CITRATE 50 UG/ML
INJECTION, SOLUTION INTRAMUSCULAR; INTRAVENOUS AS NEEDED
Status: DISCONTINUED | OUTPATIENT
Start: 2025-04-25 | End: 2025-04-25

## 2025-04-25 RX ORDER — ONDANSETRON 4 MG/1
4 TABLET, FILM COATED ORAL EVERY 6 HOURS PRN
Qty: 20 TABLET | Refills: 0 | Status: SHIPPED | OUTPATIENT
Start: 2025-04-25

## 2025-04-25 RX ORDER — MIDAZOLAM HYDROCHLORIDE 1 MG/ML
INJECTION INTRAMUSCULAR; INTRAVENOUS AS NEEDED
Status: DISCONTINUED | OUTPATIENT
Start: 2025-04-25 | End: 2025-04-25

## 2025-04-25 RX ORDER — SODIUM CHLORIDE 0.9 G/100ML
INJECTION, SOLUTION IRRIGATION AS NEEDED
Status: DISCONTINUED | OUTPATIENT
Start: 2025-04-25 | End: 2025-04-25 | Stop reason: HOSPADM

## 2025-04-25 RX ORDER — PROPOFOL 10 MG/ML
INJECTION, EMULSION INTRAVENOUS AS NEEDED
Status: DISCONTINUED | OUTPATIENT
Start: 2025-04-25 | End: 2025-04-25

## 2025-04-25 RX ORDER — DOXYCYCLINE 100 MG/1
100 TABLET ORAL 2 TIMES DAILY
Qty: 14 TABLET | Refills: 0 | Status: SHIPPED | OUTPATIENT
Start: 2025-04-25 | End: 2025-05-02

## 2025-04-25 RX ORDER — GABAPENTIN 300 MG/1
600 CAPSULE ORAL ONCE
Status: COMPLETED | OUTPATIENT
Start: 2025-04-25 | End: 2025-04-25

## 2025-04-25 RX ORDER — IBUPROFEN 600 MG/1
600 TABLET ORAL EVERY 6 HOURS PRN
Qty: 20 TABLET | Refills: 0 | Status: SHIPPED | OUTPATIENT
Start: 2025-04-25

## 2025-04-25 RX ORDER — SODIUM CHLORIDE, SODIUM LACTATE, POTASSIUM CHLORIDE, CALCIUM CHLORIDE 600; 310; 30; 20 MG/100ML; MG/100ML; MG/100ML; MG/100ML
100 INJECTION, SOLUTION INTRAVENOUS CONTINUOUS
Status: DISCONTINUED | OUTPATIENT
Start: 2025-04-25 | End: 2025-04-25 | Stop reason: HOSPADM

## 2025-04-25 RX ORDER — LIDOCAINE HYDROCHLORIDE 20 MG/ML
INJECTION, SOLUTION EPIDURAL; INFILTRATION; INTRACAUDAL; PERINEURAL AS NEEDED
Status: DISCONTINUED | OUTPATIENT
Start: 2025-04-25 | End: 2025-04-25

## 2025-04-25 RX ORDER — ONDANSETRON HYDROCHLORIDE 2 MG/ML
4 INJECTION, SOLUTION INTRAVENOUS ONCE AS NEEDED
Status: DISCONTINUED | OUTPATIENT
Start: 2025-04-25 | End: 2025-04-25 | Stop reason: HOSPADM

## 2025-04-25 RX ORDER — POLYETHYLENE GLYCOL 3350 17 G/17G
17 POWDER, FOR SOLUTION ORAL DAILY PRN
Qty: 238 G | Refills: 0 | Status: SHIPPED | OUTPATIENT
Start: 2025-04-25

## 2025-04-25 RX ORDER — OXYCODONE HYDROCHLORIDE 5 MG/1
5 TABLET ORAL EVERY 4 HOURS PRN
Status: DISCONTINUED | OUTPATIENT
Start: 2025-04-25 | End: 2025-04-25 | Stop reason: HOSPADM

## 2025-04-25 RX ORDER — INSULIN LISPRO 100 [IU]/ML
INJECTION, SOLUTION INTRAVENOUS; SUBCUTANEOUS
Qty: 135 ML | Refills: 3 | Status: SHIPPED | OUTPATIENT
Start: 2025-04-25

## 2025-04-25 RX ORDER — ACETAMINOPHEN 325 MG/1
975 TABLET ORAL ONCE
Status: COMPLETED | OUTPATIENT
Start: 2025-04-25 | End: 2025-04-25

## 2025-04-25 RX ADMIN — Medication 6 L/MIN: at 11:11

## 2025-04-25 RX ADMIN — DOXYCYCLINE 100 MG: 100 INJECTION, POWDER, LYOPHILIZED, FOR SOLUTION INTRAVENOUS at 10:45

## 2025-04-25 RX ADMIN — FENTANYL CITRATE 50 MCG: 50 INJECTION, SOLUTION INTRAMUSCULAR; INTRAVENOUS at 10:49

## 2025-04-25 RX ADMIN — SODIUM CHLORIDE, POTASSIUM CHLORIDE, SODIUM LACTATE AND CALCIUM CHLORIDE: 600; 310; 30; 20 INJECTION, SOLUTION INTRAVENOUS at 10:37

## 2025-04-25 RX ADMIN — ACETAMINOPHEN 975 MG: 325 TABLET, FILM COATED ORAL at 09:22

## 2025-04-25 RX ADMIN — FENTANYL CITRATE 50 MCG: 50 INJECTION, SOLUTION INTRAMUSCULAR; INTRAVENOUS at 10:53

## 2025-04-25 RX ADMIN — KETOROLAC TROMETHAMINE 30 MG: 30 INJECTION, SOLUTION INTRAMUSCULAR at 11:05

## 2025-04-25 RX ADMIN — PROPOFOL 130 MG: 10 INJECTION, EMULSION INTRAVENOUS at 10:44

## 2025-04-25 RX ADMIN — LIDOCAINE HYDROCHLORIDE 50 MG: 20 INJECTION, SOLUTION EPIDURAL; INFILTRATION; INTRACAUDAL; PERINEURAL at 10:44

## 2025-04-25 RX ADMIN — DEXAMETHASONE SODIUM PHOSPHATE 4 MG: 4 INJECTION, SOLUTION INTRAMUSCULAR; INTRAVENOUS at 10:49

## 2025-04-25 RX ADMIN — ONDANSETRON 4 MG: 2 INJECTION, SOLUTION INTRAMUSCULAR; INTRAVENOUS at 10:49

## 2025-04-25 RX ADMIN — MIDAZOLAM HYDROCHLORIDE 2 MG: 1 INJECTION, SOLUTION INTRAMUSCULAR; INTRAVENOUS at 10:37

## 2025-04-25 RX ADMIN — CELECOXIB 400 MG: 200 CAPSULE ORAL at 09:22

## 2025-04-25 RX ADMIN — PROPOFOL 30 MG: 10 INJECTION, EMULSION INTRAVENOUS at 10:49

## 2025-04-25 RX ADMIN — GABAPENTIN 600 MG: 300 CAPSULE ORAL at 09:22

## 2025-04-25 SDOH — HEALTH STABILITY: MENTAL HEALTH: CURRENT SMOKER: 0

## 2025-04-25 ASSESSMENT — PAIN - FUNCTIONAL ASSESSMENT
PAIN_FUNCTIONAL_ASSESSMENT: 0-10
PAIN_FUNCTIONAL_ASSESSMENT: UNABLE TO SELF-REPORT
PAIN_FUNCTIONAL_ASSESSMENT: 0-10
PAIN_FUNCTIONAL_ASSESSMENT: 0-10

## 2025-04-25 ASSESSMENT — PAIN SCALES - GENERAL
PAINLEVEL_OUTOF10: 0 - NO PAIN

## 2025-04-25 ASSESSMENT — COLUMBIA-SUICIDE SEVERITY RATING SCALE - C-SSRS
6. HAVE YOU EVER DONE ANYTHING, STARTED TO DO ANYTHING, OR PREPARED TO DO ANYTHING TO END YOUR LIFE?: NO
2. HAVE YOU ACTUALLY HAD ANY THOUGHTS OF KILLING YOURSELF?: NO
1. IN THE PAST MONTH, HAVE YOU WISHED YOU WERE DEAD OR WISHED YOU COULD GO TO SLEEP AND NOT WAKE UP?: NO

## 2025-04-25 NOTE — ANESTHESIA POSTPROCEDURE EVALUATION
Patient: Sarah Dunnsall    Procedure Summary       Date: 04/25/25 Room / Location: [facility] A OR 09 / Virtual [facility] A OR    Anesthesia Start: 1037 Anesthesia Stop: 1116    Procedure: Hysterosalingogram (Pelvis) Diagnosis:       Hydrosalpinx      (Hydrosalpinx [N70.11])    Surgeons: Elsie Hathaway MD Responsible Provider: Frank Patricia MD    Anesthesia Type: general ASA Status: 3            Anesthesia Type: general    Vitals Value Taken Time   /66 04/25/25 12:05   Temp 36.5 °C (97.7 °F) 04/25/25 12:05   Pulse 85 04/25/25 12:05   Resp 18 04/25/25 12:05   SpO2 98 % 04/25/25 12:05       Anesthesia Post Evaluation    Patient location during evaluation: bedside  Patient participation: complete - patient participated  Level of consciousness: awake and alert  Pain management: adequate  Airway patency: patent  Cardiovascular status: acceptable  Respiratory status: acceptable  Hydration status: acceptable  Postoperative Nausea and Vomiting: none        No notable events documented.

## 2025-04-25 NOTE — ANESTHESIA PREPROCEDURE EVALUATION
Patient: Sarah Gallagher    Procedure Information       Date/Time: 04/25/25 1015    Procedure: Hysterosalingogram (Pelvis)    Location: [facility] A OR 09 / Virtual [facility] A OR    Surgeons: Elsie Hathaway MD            Relevant Problems   Anesthesia (within normal limits)      Cardiac   (+) Chest pain   (+) Elevated cholesterol   (+) Mixed hyperlipidemia      Pulmonary   (+) Asthma      Neuro   (+) Anxiety attack   (+) Bell's palsy   (+) Ruelas's neuroma of left foot   (+) Tarsal tunnel syndrome      GI (within normal limits)      /Renal (within normal limits)      Liver (within normal limits)      Endocrine   (+) Pre-existing type 2 diabetes mellitus during pregnancy in second trimester (HHS-HCC)   (+) Type 2 diabetes mellitus with hyperglycemia, without long-term current use of insulin   (+) Type 2 diabetes mellitus without complications      Hematology (within normal limits)      Musculoskeletal (within normal limits)      HEENT   (+) Glaucoma      ID (within normal limits)      Skin   (+) Rash      GYN   (+) Endometriosis       Clinical information reviewed:    Allergies                NPO Detail:  No data recorded     Physical Exam    Airway  Mallampati: I  TM distance: >3 FB  Neck ROM: full  Mouth opening: 3 or more finger widths     Cardiovascular - normal exam   Dental - normal exam     Pulmonary - normal exam   Abdominal - normal exam           Anesthesia Plan    History of general anesthesia?: yes  History of complications of general anesthesia?: no    ASA 3     general     The patient is not a current smoker.    intravenous induction   Anesthetic plan and risks discussed with patient.  Use of blood products discussed with patient who consented to blood products.

## 2025-04-25 NOTE — ANESTHESIA PROCEDURE NOTES
Airway  Date/Time: 4/25/2025 10:48 AM  Reason: elective    Airway not difficult    Staffing  Performed: ARVIND   Authorized by: Frank Patricia MD    Performed by: ARVIND Luu  Patient location during procedure: OR    Patient Condition  Indications for airway management: anesthesia  Patient position: sniffing  Sedation level: deep     Final Airway Details   Preoxygenated: yes  Final airway type: supraglottic airway  Successful airway:   Size: 3  Number of attempts at approach: 1

## 2025-04-25 NOTE — OP NOTE
Patient Name: Sarah Gallagher    MRN: [id number]  Log ID: [id number]    Surgery Date: 4/25/2025    Surgeon:      * Elsie Hathaway - Primary    Pre-op Diagnosis: Hydrosalpinx, endometriosis    Post-op Diagnosis: same    Procedures and Anesthesia:  Procedure(s) and Anesthesia Type:     * Hysterosalingogram - General    Findings:  Bilateral spilling hydrosalpinges, left > right    Estimated Blood Loss: 5cc    IVF: 200cc    UOP: NR    Specimens: None    Complications: None      Diagnosis Code(s): Pre-Op Diagnosis Codes:      * Hydrosalpinx [N70.11]      Hysterosalpingogram (HSG) risks, benefits, alternatives, and personnel discussed with patient who agreed to proceed.    Procedural time out        Done in room where procedure done: Yes        Done just before starting procedure: Yes                                                 All members of procedural team involved in time-out: Yes                  Active communication used: Yes                                                         All team members agreed on procedure: Yes                                        Patient correctly identified by two identifiers: Yes                                  Correct side and site identified: Yes                                                     All needed special equipment/instruments available: Yes               Prior to the start of the procedure a time out was taken and the following were verified: The identity of the patient using two patient identifiers.   Urine pregnancy test was performed and was negative.   Risks, benefits, and alternatives of the procedure were explained to the patient.  Informed consent was obtained.     The patient was placed in the dorsal lithotomy position and a sterile speculum was placed in the vagina. The cervix was sterilized with Betadine x 3. The anterior lip of the cervix was grasped with a single-tooth tenaculum. The (balloon/acorn) cannula was then placed in the cervix and secured to the  tenaculum. The patient was positioned and images were taken with fluoroscopy as dye was inserted through the cannula. All instruments were then removed. The patient tolerated the procedure well and was discharged home the same day without complications. Doxycycline was prescribed.     Uterus:  normal contour without filling defects  Tubes:  Bilateral spilling hydrosalpinges, left > right  Based on these findings, my recommendation is: Follow up required, chart forwarded to primary MD.    The patient was counseled regarding the above findings and images were reviewed with patient at the time of the exam.     Counts: Sponge, Lap, and Needle counts correct x 2 at the conclusion of the case. Dr. Hathaway performed the procedure with the assistance of the resident/fellow.     Signature: Elsie Hathaway MD  Date: April 25, 2025  Time: 11:13 AM

## 2025-04-25 NOTE — PROGRESS NOTES
Patient ID: Sarah Gallagher is a 34 y.o. female who presents for No chief complaint on file..  HPI  ***    ROS  Comprehensive review of systems is negative.    Objective   Physical Exam  There were no vitals taken for this visit.   There were no vitals filed for this visit.   There is no height or weight on file to calculate BMI.      ***    Current Medications[1]    Assessment/Plan   ***             [1]   Current Outpatient Medications   Medication Sig Dispense Refill    alcohol swabs pads, medicated Apply 1 Swab topically 4 times a day. Use 4 wipes/swabs daily. 400 each 3    blood sugar diagnostic (OneTouch Verio test strips) strip        blood sugar diagnostic (OneTouch Verio test strips) strip USE EVERY 12 HOURS AS DIRECTED 200 each 2    blood-glucose meter (OneTouch Verio Flex meter) misc Use as directed to check blood sugar 1 each 0    brimonidine (AlphaGAN) 0.2 % ophthalmic solution INSTILL 1 DROP IN BOTH EYES TWICE DAILY. 10 mL 5    dulaglutide (Trulicity) 4.5 mg/0.5 mL pen injector Inject 4.5 mg subcutaneously one time a week. 2 mL 11    empagliflozin (Jardiance) 10 mg Take 1 tablet by mouth daily with breakfast. 30 tablet 11    flash glucose sensor kit (FreeStyle Ricky 14 Day Sensor) kit USE AS DIRECTED 2 each 11    ibuprofen 600 mg tablet Take 1 tablet (600 mg) by mouth every 6 hours if needed for moderate pain (4 - 6) for up to 20 doses. 20 tablet 0    insulin lispro 100 unit/mL injection Inject 70 Units under the skin once daily. Take as directed per insulin instructions. 63 mL 3    insulin lispro 100 unit/mL injection Inject 85 Units under the skin once daily. Take as directed per insulin instructions. On a pump 76.5 mL 3    insulin lispro protamin-lispro (HumaLOG Mix 50-50 KwikPen) 100 unit/mL (50-50) pen Inject under the skin 2 times daily (morning and late afternoon). Take as directed per insulin instructions.      latanoprost (Xalatan) 0.005 % ophthalmic solution INSTILL ONE DROP INTO BOTH EYES  "ONE TIME DAILY AT BEDTIME 7.5 mL 5    metFORMIN  mg 24 hr tablet Take 1 tablet (500 mg) by mouth 2 times daily (morning and late afternoon). 180 tablet 3    pen needle, diabetic 32 gauge x 5/32\" needle Use every day with Levemir 100 each 3    polyethylene glycol (Glycolax, Miralax) 17 gram packet Take 17 g by mouth once daily as needed (for constipation) for up to 10 doses. 10 packet 0    timolol (Timoptic) 0.5 % ophthalmic solution USE 1 DROP IN BOTH EYES TWICE DAILY. 10 mL 5     No current facility-administered medications for this visit.     "

## 2025-04-28 ASSESSMENT — PAIN SCALES - GENERAL: PAINLEVEL_OUTOF10: 2

## 2025-05-05 ENCOUNTER — LAB REQUISITION (OUTPATIENT)
Dept: LAB | Facility: HOSPITAL | Age: 35
End: 2025-05-05

## 2025-05-05 DIAGNOSIS — N97.9 FEMALE INFERTILITY, UNSPECIFIED: ICD-10-CM

## 2025-05-05 PROCEDURE — 84144 ASSAY OF PROGESTERONE: CPT

## 2025-05-05 NOTE — PROGRESS NOTES
Patient going to  lab for CD 21 P4 level to assess if patient is ovulating on her own (not taking any OI this cycle).        Patient going to lab today after 1500. Patient added to noon huddle tomorrow 5/6 for review.        05/05/25 at 11:07 AM - Shannon Tam LPN

## 2025-05-05 NOTE — PROGRESS NOTES
Patient going to  lab for CD 21 P4 level to assess if patient is ovulating on her own (not taking any OI this cycle).    Latest Reference Range & Units 05/05/25 15:40   Progesterone ng/mL 8.4     tasneem bull 05/05/25 4:29 PM    HUDDLE PROVIDER NOTE- PROGESTERONE CHECK  Ultrasound and/or labs reviewed at [facility].   Results for orders placed or performed in visit on 05/05/25 (from the past 96 hours)   Progesterone   Result Value Ref Range    Progesterone 8.4 ng/mL     *Note: Due to a large number of results and/or encounters for the requested time period, some results have not been displayed. A complete set of results can be found in Results Review.       Ovulatory progesterone  yes    Next steps: Call with menses or if no menses within two weeks, check pregnancy test and call with result. Track total cycle length.   Mackenzie Ruiz  05/06/2025  11:35 AM    Contacted patient with plan stated above. Patient verbalized understanding.  tasneem bull 05/06/25 11:41 AM

## 2025-05-06 LAB — PROGEST SERPL-MCNC: 8.4 NG/ML

## 2025-05-14 NOTE — PROGRESS NOTES
[place] Oral TIC/IUI    Age: 34 y.o.    Provider: { RENE Providers:47106}  Primary RN: ***  Reasons for Treatment: {RENE [id number]:09661}  Last BMI  25 : 32.02 kg/m²       Medical History[1]    Date Done Consultation Results/Comments   *** Medication Protocol   Fertility Plan Update: ***  Adjuncts: {RENE ADJUNCTS:89984}  Notes: ***   *** Authorization to Share []      *** Procedure Order Placed []     Date Done Female Labs Results/Comments   2024 T&S (Q 1 Year) ABO: A  Rh: POS  Antibody: NEG     No results found for requested labs within last 365 days. Hep B sAg No results found for requested labs within last 365 days.   No results found for requested labs within last 365 days. Hep C AB No results found for requested labs within last 365 days.   No results found for requested labs within last 365 days. HIV No results found for requested labs within last 365 days.   No results found for requested labs within last 365 days. Syphilis No results found for requested labs within last 365 days.   No results found for requested labs within last 365 days. GC/CT GC: No results found for requested labs within last 365 days.  CT: No results found for requested labs within last 365 days.   2020 Rubella (Q 5 Years) 116.80   No results found for requested labs within last 1825 days. Varicella (Q 5 Years) No results found for requested labs within last 1825 days.   *** Carrier Screening Myriad 2bP: ***  {RENE Carrier Screenin}  {RENE Carrier Screening Neg/Pos:35220}   ***  GYN Waiver []      Date Done Male Labs (required if IUI)   Results/Comments  ***   *** Hep B sAg ***   *** Hep C AB  ***   *** HIV ***   *** Syphilis ***   *** GC/CT GC: ***  CT: ***   *** Carrier Screening ***  {RENE Carrier Screenin}  {RENE Carrier Screening Neg/Pos:79086}   *** Semen Analysis  Volume(mL): ***  Count(million): ***  Motility(%): ***  Motile Count(million): ***     MD Completion:  Ectopic Risk:  {YES/NO/NA:40513}  Medically Complex: {YES/NO/NA:97216}       [1]   Past Medical History:  Diagnosis Date    Abnormal uterine bleeding (AUB)     Plan: Laparoscopic Excision of Endometriosis; Bilateral Chromopertubation;   Possible Bilateral Ovarian Cystectomy;   Possible Left Salpingectomy;   Hysteroscopy 11/22/24    Bell's palsy     Chondromalacia, patella, right     s/p KNEE SCOPE,MENISECTOMY,MED OR LAT    KNEE SCOPE,SHAVE ARTICULAR CART    ARTHRS KNEE W LAT RELEASE    ARTHROSCOPY KNEE MENISCECTOMY MEDIAL OR LATERAL    CHONDROPLASTY KNEE    ARTHROSCOPY KNEE, SURGICAL, LATERAL RELEASE   5/24/19    Chronic pelvic pain in female     Endometrioma     Endometriosis     Femoral hernia of right side     Glaucoma     Hydrosalpinx     Hyperlipidemia     Obesity     Type 2 diabetes mellitus     4/12/24 7.7%    Vitamin D deficiency

## 2025-05-21 ENCOUNTER — TELEPHONE (OUTPATIENT)
Dept: PRIMARY CARE | Facility: CLINIC | Age: 35
End: 2025-05-21
Payer: COMMERCIAL

## 2025-05-22 DIAGNOSIS — E11.65 TYPE 2 DIABETES MELLITUS WITH HYPERGLYCEMIA, WITHOUT LONG-TERM CURRENT USE OF INSULIN: Primary | ICD-10-CM

## 2025-05-22 NOTE — PROGRESS NOTES
Patient ID: Sarah Gallagher is a 34 y.o. female who presents for No chief complaint on file..  HPI  ***    ROS  Comprehensive review of systems is negative.    Objective   Physical Exam  There were no vitals taken for this visit.   There were no vitals filed for this visit.   There is no height or weight on file to calculate BMI.      ***    Current Medications[1]    Assessment/Plan   ***             [1]   Current Outpatient Medications   Medication Sig Dispense Refill    acetaminophen (Tylenol) 325 mg tablet Take 2 tablets (650 mg) by mouth every 6 hours if needed for mild pain (1 - 3) for up to 20 doses. 20 tablet 0    alcohol swabs pads, medicated Apply 1 Swab topically 4 times a day. Use 4 wipes/swabs daily. 400 each 3    blood sugar diagnostic (OneTouch Verio test strips) strip        blood sugar diagnostic (OneTouch Verio test strips) strip USE EVERY 12 HOURS AS DIRECTED 200 each 2    blood-glucose meter (OneTouch Verio Flex meter) misc Use as directed to check blood sugar 1 each 0    brimonidine (AlphaGAN) 0.2 % ophthalmic solution INSTILL 1 DROP IN BOTH EYES TWICE DAILY. 10 mL 5    dulaglutide (Trulicity) 4.5 mg/0.5 mL pen injector Inject 4.5 mg subcutaneously one time a week. (Patient not taking: Reported on 4/25/2025) 2 mL 11    empagliflozin (Jardiance) 10 mg Take 1 tablet by mouth daily with breakfast. (Patient not taking: Reported on 4/25/2025) 30 tablet 11    flash glucose sensor kit (FreeStyle Ricky 14 Day Sensor) kit USE AS DIRECTED 2 each 11    ibuprofen 600 mg tablet Take 1 tablet (600 mg) by mouth every 6 hours if needed for moderate pain (4 - 6) for up to 20 doses. 20 tablet 0    ibuprofen 600 mg tablet Take 1 tablet (600 mg) by mouth every 6 hours if needed for moderate pain (4 - 6) for up to 20 doses. 20 tablet 0    insulin lispro 100 unit/mL injection Inject 70 Units under the skin once daily. Take as directed per insulin instructions. 63 mL 3    insulin lispro 100 unit/mL injection Inject  "150 Units under the skin once daily. Take as directed per insulin instructions. On a pump (Patient not taking: Reported on 4/25/2025) 135 mL 3    insulin lispro protamin-lispro (HumaLOG Mix 50-50 KwikPen) 100 unit/mL (50-50) pen Inject under the skin 2 times daily (morning and late afternoon). Take as directed per insulin instructions.      latanoprost (Xalatan) 0.005 % ophthalmic solution INSTILL ONE DROP INTO BOTH EYES ONE TIME DAILY AT BEDTIME 7.5 mL 5    metFORMIN  mg 24 hr tablet Take 1 tablet (500 mg) by mouth 2 times daily (morning and late afternoon). 180 tablet 3    ondansetron (Zofran) 4 mg tablet Take 1 tablet (4 mg) by mouth every 6 hours if needed for nausea for up to 20 doses. 20 tablet 0    pen needle, diabetic 32 gauge x 5/32\" needle Use every day with Levemir 100 each 3    polyethylene glycol (Glycolax, Miralax) 17 gram packet Take 17 g by mouth once daily as needed (for constipation) for up to 10 doses. 10 packet 0    polyethylene glycol (Glycolax, Miralax) 17 gram/dose powder Place 1 capful (17 g) into 8 ounces of water and drink by mouth once daily as needed (for constipation) for up to 10 doses. 238 g 0    timolol (Timoptic) 0.5 % ophthalmic solution USE 1 DROP IN BOTH EYES TWICE DAILY. 10 mL 5     No current facility-administered medications for this visit.     "

## 2025-05-29 PROCEDURE — RXMED WILLOW AMBULATORY MEDICATION CHARGE

## 2025-05-29 RX ORDER — INSULIN PMP CART,AUT,G6/7,CNTR
1 EACH SUBCUTANEOUS
Qty: 30 EACH | Refills: 3 | Status: SHIPPED | OUTPATIENT
Start: 2025-05-29

## 2025-05-29 RX ORDER — INSULIN PMP CART,AUT,G6/7,CNTR
1 EACH SUBCUTANEOUS EVERY OTHER DAY
Qty: 1 EACH | Refills: 0 | Status: SHIPPED | OUTPATIENT
Start: 2025-05-29

## 2025-05-30 PROCEDURE — RXMED WILLOW AMBULATORY MEDICATION CHARGE

## 2025-06-04 ENCOUNTER — PHARMACY VISIT (OUTPATIENT)
Dept: PHARMACY | Facility: CLINIC | Age: 35
End: 2025-06-04
Payer: MEDICAID

## 2025-06-18 NOTE — PROGRESS NOTES
Subjective      Chief Complaint   Patient presents with    Right Shoulder - Pain    Left Shoulder - Pain    Neck - Pain        Surgical History[1]     HPI  This 34 year old established patient presents today with complaints of bilateral shoulder pain, right worse than left and neck pain which they rate at 8/10. The patient states that the bilateral shoulder and neck pain has been present for several weeks. The patient denies trauma or injury and states that she woke up with pain in her right shoulder that radiates down her right arm to her hand. She then developed left shoulder pain and left radiculopathy shortly afterwards. She has complaints of numbness primarily in her right hand.  The patient states that the bilateral shoulder pain is worse with and aggravated by reaching and lifting. The patient states that this shoulder and neck pain is disabling and presents today to discuss further options. The patient states that they have tried tylenol and ibuprofen with no relief.  Past history is significant in that this patient has insulin-dependent diabetes and has an insulin pump.    CARDIOLOGY:   Negative for chest pain, shortness of breath.   RESPIRATORY:   Negative for chest pain, shortness of breath.   MUSCULOSKELETAL:   See HPI for details.   NEUROLOGY:   Right and left hand numbness and tingling, right worse than left.    Objective      There were no vitals taken for this visit.     SHOULDER EXAM  Constitutional: Appears stated age. No apparent distress  Labored Breathing: No  Psychiatric: Normal mood and effect.   Neurological: alert and oriented x3  Skin: intact  HEENT: No bruising, otorrhea, rhinorrhea.  MUSCULOSKELETAL: Neck: No tenderness. No pain or limitation with range of motion.  No increase in numbness or tingling of the right or left hands with motion of the neck.  Back: No tenderness. Straight leg test negative bilaterally. bilateral shoulder: There is tenderness anteriorly and laterally. Active  abduction and active flexion are 0-90 degrees on the right and 0 to 125 degrees on the left, each with pain and guarding. There is pain with and limitation of active and passive internal and external rotation. Comparments are soft. Neurovascular is intact.  Right and left hands: No pain with range of motion.  Phalen's appears positive on the right and also mildly positive on the left.  X-rays of the cervical spine including lateral flexion and extension x-rays show mild osteoarthritis with spurring but no subluxation or dislocation with range of motion and no definite acute fracture.  X-rays of the right and left shoulders done and read in the office today show calcification adjacent to the right greater tuberosity consistent with calcific tendinitis on the right.  No acute fracture is evident.  I reviewed the x-rays listed above with the patient in the office today.      No images are attached to the encounter  L Inj/Asp: R glenohumeral on 6/19/2025 10:47 AM  Indications: pain  Details: 22 G needle  Medications: 1 mg triamcinolone acetonide 40 mg/mL; 1 mL lidocaine 10 mg/mL (1 %)  Outcome: tolerated well, no immediate complications  Procedure, treatment alternatives, risks and benefits explained, specific risks discussed. Consent was given by the patient. Immediately prior to procedure a time out was called to verify the correct patient, procedure, equipment, support staff and site/side marked as required. Patient was prepped and draped in the usual sterile fashion.         Sarah was seen today for pain, pain and pain.  Diagnoses and all orders for this visit:  Calcific tendinitis of right shoulder (Primary)  -     Referral to Physical Therapy; Future  Bilateral shoulder pain, unspecified chronicity  -     XR shoulder 2+ views bilateral; Future  -     Referral to Physical Therapy; Future  Bilateral shoulder bursitis  -     Referral to Physical Therapy; Future  Cervicalgia  -     XR cervical spine complete 4-5  views; Future  -     Referral to Physical Therapy; Future  Cervical radiculopathy  -     Referral to Physical Therapy; Future  Carpal tunnel syndrome of right wrist  Bursitis of right shoulder  -     L Inj/Asp: R glenohumeral  Other orders  -     Cancel: L Inj/Asp     Options are discussed with the patient in detail. The patient is given a prescription for physical therapy to evaluate and treat with gentle strengthening and ROM exercises with modalities as needed. The patient is instructed regarding activity modification and risk for further injury with falling or trauma, ice, provider directed at home gentle strengthening and ROM exercises, and the appropriate use of Tylenol as needed for pain with its potential adverse reactions and side effects. The patient understands.  She states that this right shoulder pain and loss of motion are particularly disabling and she requests a discussion of further options.  Cortisone injection to the right shoulder is discussed and the patient wishes a cortisone injection to the right shoulder.  This is done in the office today.  See procedures.  Follow up in 6 weeks or sooner as needed. Please note that this report has been produced using speech recognition software.  It may contain errors related to grammar, punctuation or spelling.  Electronically signed, but not reviewed.   Porter Sweet MD           [1]   Past Surgical History:  Procedure Laterality Date    KNEE Left     knee scope    MENISCECTOMY Right 05/24/2019    KNEE SCOPE,MENISECTOMY,MED OR LAT    KNEE SCOPE,SHAVE ARTICULAR CART    ARTHRS KNEE W LAT RELEASE    ARTHROSCOPY KNEE MENISCECTOMY MEDIAL OR LATERAL    CHONDROPLASTY KNEE    ARTHROSCOPY KNEE, SURGICAL, LATERAL RELEASE

## 2025-06-19 ENCOUNTER — HOSPITAL ENCOUNTER (OUTPATIENT)
Dept: RADIOLOGY | Facility: CLINIC | Age: 35
Discharge: HOME | End: 2025-06-19
Payer: COMMERCIAL

## 2025-06-19 ENCOUNTER — OFFICE VISIT (OUTPATIENT)
Dept: ORTHOPEDIC SURGERY | Facility: CLINIC | Age: 35
End: 2025-06-19
Payer: COMMERCIAL

## 2025-06-19 VITALS — BODY MASS INDEX: 32.25 KG/M2 | HEIGHT: 59 IN | WEIGHT: 160 LBS

## 2025-06-19 DIAGNOSIS — M25.512 BILATERAL SHOULDER PAIN, UNSPECIFIED CHRONICITY: ICD-10-CM

## 2025-06-19 DIAGNOSIS — M54.12 CERVICAL RADICULOPATHY: ICD-10-CM

## 2025-06-19 DIAGNOSIS — G56.01 CARPAL TUNNEL SYNDROME OF RIGHT WRIST: ICD-10-CM

## 2025-06-19 DIAGNOSIS — M54.2 CERVICALGIA: ICD-10-CM

## 2025-06-19 DIAGNOSIS — M25.511 BILATERAL SHOULDER PAIN, UNSPECIFIED CHRONICITY: ICD-10-CM

## 2025-06-19 DIAGNOSIS — M75.51 BURSITIS OF RIGHT SHOULDER: ICD-10-CM

## 2025-06-19 DIAGNOSIS — M75.31 CALCIFIC TENDINITIS OF RIGHT SHOULDER: Primary | ICD-10-CM

## 2025-06-19 DIAGNOSIS — M75.52 BILATERAL SHOULDER BURSITIS: ICD-10-CM

## 2025-06-19 DIAGNOSIS — M75.51 BILATERAL SHOULDER BURSITIS: ICD-10-CM

## 2025-06-19 PROCEDURE — 99213 OFFICE O/P EST LOW 20 MIN: CPT | Performed by: ORTHOPAEDIC SURGERY

## 2025-06-19 PROCEDURE — 73030 X-RAY EXAM OF SHOULDER: CPT | Mod: BILATERAL PROCEDURE | Performed by: ORTHOPAEDIC SURGERY

## 2025-06-19 PROCEDURE — 20610 DRAIN/INJ JOINT/BURSA W/O US: CPT | Mod: RT | Performed by: ORTHOPAEDIC SURGERY

## 2025-06-19 PROCEDURE — 72050 X-RAY EXAM NECK SPINE 4/5VWS: CPT

## 2025-06-19 PROCEDURE — 3044F HG A1C LEVEL LT 7.0%: CPT | Performed by: ORTHOPAEDIC SURGERY

## 2025-06-19 PROCEDURE — 72050 X-RAY EXAM NECK SPINE 4/5VWS: CPT | Performed by: ORTHOPAEDIC SURGERY

## 2025-06-19 PROCEDURE — 73030 X-RAY EXAM OF SHOULDER: CPT | Mod: 50

## 2025-06-19 PROCEDURE — 99214 OFFICE O/P EST MOD 30 MIN: CPT | Mod: 25 | Performed by: ORTHOPAEDIC SURGERY

## 2025-06-19 PROCEDURE — 2500000004 HC RX 250 GENERAL PHARMACY W/ HCPCS (ALT 636 FOR OP/ED): Performed by: ORTHOPAEDIC SURGERY

## 2025-06-19 PROCEDURE — 1036F TOBACCO NON-USER: CPT | Performed by: ORTHOPAEDIC SURGERY

## 2025-06-19 PROCEDURE — 3008F BODY MASS INDEX DOCD: CPT | Performed by: ORTHOPAEDIC SURGERY

## 2025-06-19 RX ORDER — LIDOCAINE HYDROCHLORIDE 10 MG/ML
1 INJECTION, SOLUTION INFILTRATION; PERINEURAL
Status: COMPLETED | OUTPATIENT
Start: 2025-06-19 | End: 2025-06-19

## 2025-06-19 RX ORDER — TRIAMCINOLONE ACETONIDE 40 MG/ML
1 INJECTION, SUSPENSION INTRA-ARTICULAR; INTRAMUSCULAR
Status: COMPLETED | OUTPATIENT
Start: 2025-06-19 | End: 2025-06-19

## 2025-06-19 RX ADMIN — TRIAMCINOLONE ACETONIDE 1 MG: 40 INJECTION, SUSPENSION INTRA-ARTICULAR; INTRAMUSCULAR at 10:47

## 2025-06-19 RX ADMIN — LIDOCAINE HYDROCHLORIDE 1 ML: 10 INJECTION, SOLUTION INFILTRATION; PERINEURAL at 10:47

## 2025-06-19 ASSESSMENT — LIFESTYLE VARIABLES
HOW OFTEN DURING THE LAST YEAR HAVE YOU NEEDED AN ALCOHOLIC DRINK FIRST THING IN THE MORNING TO GET YOURSELF GOING AFTER A NIGHT OF HEAVY DRINKING: NEVER
HOW OFTEN DURING THE LAST YEAR HAVE YOU BEEN UNABLE TO REMEMBER WHAT HAPPENED THE NIGHT BEFORE BECAUSE YOU HAD BEEN DRINKING: NEVER
HOW OFTEN DURING THE LAST YEAR HAVE YOU FOUND THAT YOU WERE NOT ABLE TO STOP DRINKING ONCE YOU HAD STARTED: NEVER
AUDIT-C TOTAL SCORE: 0
HOW MANY STANDARD DRINKS CONTAINING ALCOHOL DO YOU HAVE ON A TYPICAL DAY: PATIENT DOES NOT DRINK
HOW OFTEN DO YOU HAVE A DRINK CONTAINING ALCOHOL: NEVER
HOW OFTEN DO YOU HAVE SIX OR MORE DRINKS ON ONE OCCASION: NEVER
AUDIT TOTAL SCORE: 0
HOW OFTEN DURING THE LAST YEAR HAVE YOU HAD A FEELING OF GUILT OR REMORSE AFTER DRINKING: NEVER
SKIP TO QUESTIONS 9-10: 1
HAS A RELATIVE, FRIEND, DOCTOR, OR ANOTHER HEALTH PROFESSIONAL EXPRESSED CONCERN ABOUT YOUR DRINKING OR SUGGESTED YOU CUT DOWN: NO
HAVE YOU OR SOMEONE ELSE BEEN INJURED AS A RESULT OF YOUR DRINKING: NO
HOW OFTEN DURING THE LAST YEAR HAVE YOU FAILED TO DO WHAT WAS NORMALLY EXPECTED FROM YOU BECAUSE OF DRINKING: NEVER

## 2025-06-19 ASSESSMENT — ENCOUNTER SYMPTOMS
OCCASIONAL FEELINGS OF UNSTEADINESS: 0
LOSS OF SENSATION IN FEET: 0
DEPRESSION: 0

## 2025-06-19 ASSESSMENT — PAIN SCALES - GENERAL
PAINLEVEL_OUTOF10: 8
PAINLEVEL_OUTOF10: 8

## 2025-06-19 ASSESSMENT — PAIN DESCRIPTION - DESCRIPTORS: DESCRIPTORS: RADIATING;SHARP

## 2025-06-19 ASSESSMENT — PAIN - FUNCTIONAL ASSESSMENT: PAIN_FUNCTIONAL_ASSESSMENT: 0-10

## 2025-06-19 ASSESSMENT — COLUMBIA-SUICIDE SEVERITY RATING SCALE - C-SSRS
1. IN THE PAST MONTH, HAVE YOU WISHED YOU WERE DEAD OR WISHED YOU COULD GO TO SLEEP AND NOT WAKE UP?: NO
2. HAVE YOU ACTUALLY HAD ANY THOUGHTS OF KILLING YOURSELF?: NO
6. HAVE YOU EVER DONE ANYTHING, STARTED TO DO ANYTHING, OR PREPARED TO DO ANYTHING TO END YOUR LIFE?: NO

## 2025-06-19 NOTE — PATIENT INSTRUCTIONS
Thank you for coming to see us today!     We are going to give you a referral for physical therapy   Please call central scheduling to make this appointment.     Recommend that you purchase over the counter wrist splints to wear at night.     Please follow up with us in 6 weeks, sooner if needed.

## 2025-06-21 PROCEDURE — RXMED WILLOW AMBULATORY MEDICATION CHARGE

## 2025-06-23 PROCEDURE — RXMED WILLOW AMBULATORY MEDICATION CHARGE

## 2025-06-26 DIAGNOSIS — E11.65 TYPE 2 DIABETES MELLITUS WITH HYPERGLYCEMIA, WITHOUT LONG-TERM CURRENT USE OF INSULIN: ICD-10-CM

## 2025-06-26 RX ORDER — BLOOD-GLUCOSE SENSOR
EACH MISCELLANEOUS
Qty: 9 EACH | Refills: 3 | Status: SHIPPED | OUTPATIENT
Start: 2025-06-26

## 2025-06-26 RX ORDER — BLOOD-GLUCOSE,RECEIVER,CONT
EACH MISCELLANEOUS
Qty: 1 EACH | Refills: 0 | Status: SHIPPED | OUTPATIENT
Start: 2025-06-26

## 2025-06-27 ENCOUNTER — PATIENT MESSAGE (OUTPATIENT)
Dept: ORTHOPEDIC SURGERY | Facility: CLINIC | Age: 35
End: 2025-06-27
Payer: COMMERCIAL

## 2025-06-27 DIAGNOSIS — M25.511 BILATERAL SHOULDER PAIN, UNSPECIFIED CHRONICITY: ICD-10-CM

## 2025-06-27 DIAGNOSIS — M75.31 CALCIFIC TENDINITIS OF RIGHT SHOULDER: ICD-10-CM

## 2025-06-27 DIAGNOSIS — M25.512 BILATERAL SHOULDER PAIN, UNSPECIFIED CHRONICITY: ICD-10-CM

## 2025-06-27 DIAGNOSIS — M75.52 BILATERAL SHOULDER BURSITIS: ICD-10-CM

## 2025-06-27 DIAGNOSIS — M75.51 BILATERAL SHOULDER BURSITIS: ICD-10-CM

## 2025-06-27 PROCEDURE — RXMED WILLOW AMBULATORY MEDICATION CHARGE

## 2025-06-27 RX ORDER — DICLOFENAC SODIUM 10 MG/G
4 GEL TOPICAL 4 TIMES DAILY PRN
Qty: 400 G | Refills: 1 | Status: SHIPPED | OUTPATIENT
Start: 2025-06-27 | End: 2025-08-26

## 2025-06-27 RX ORDER — LIDOCAINE 50 MG/G
1 PATCH TOPICAL DAILY
Qty: 30 PATCH | Refills: 1 | Status: SHIPPED | OUTPATIENT
Start: 2025-06-27 | End: 2025-07-27

## 2025-07-02 DIAGNOSIS — E11.9 TYPE 2 DIABETES MELLITUS WITHOUT COMPLICATION, WITHOUT LONG-TERM CURRENT USE OF INSULIN: ICD-10-CM

## 2025-07-02 DIAGNOSIS — E11.65 TYPE 2 DIABETES MELLITUS WITH HYPERGLYCEMIA, WITHOUT LONG-TERM CURRENT USE OF INSULIN: Primary | ICD-10-CM

## 2025-07-02 PROCEDURE — RXMED WILLOW AMBULATORY MEDICATION CHARGE

## 2025-07-02 RX ORDER — BLOOD SUGAR DIAGNOSTIC
1 STRIP MISCELLANEOUS DAILY
Qty: 50 EACH | Refills: 11 | Status: SHIPPED | OUTPATIENT
Start: 2025-07-02 | End: 2026-07-02

## 2025-07-02 RX ORDER — DEXTROSE 4 G
TABLET,CHEWABLE ORAL
Qty: 1 EACH | Refills: 0 | Status: SHIPPED | OUTPATIENT
Start: 2025-07-02

## 2025-07-02 RX ORDER — LANCETS 33 GAUGE
EACH MISCELLANEOUS
Qty: 200 EACH | Refills: 2 | Status: CANCELLED | OUTPATIENT
Start: 2025-07-02

## 2025-07-02 RX ORDER — LANCETS 33 GAUGE
EACH MISCELLANEOUS
Qty: 200 EACH | Refills: 2 | Status: SHIPPED | OUTPATIENT
Start: 2025-07-02

## 2025-07-02 NOTE — PROGRESS NOTES
Patient ID: Sarah Gallagher is a 34 y.o. female who presents for No chief complaint on file..  HPI  ***    ROS  Comprehensive review of systems is negative.    Objective   Physical Exam  There were no vitals taken for this visit.   There were no vitals filed for this visit.   There is no height or weight on file to calculate BMI.      ***    Current Medications[1]    Assessment/Plan   ***             [1]   Current Outpatient Medications   Medication Sig Dispense Refill    acetaminophen (Tylenol) 325 mg tablet Take 2 tablets (650 mg) by mouth every 6 hours if needed for mild pain (1 - 3) for up to 20 doses. 20 tablet 0    alcohol swabs pads, medicated Apply 1 Swab topically 4 times a day. Use 4 wipes/swabs daily. 400 each 3    blood sugar diagnostic (OneTouch Verio test strips) strip        blood sugar diagnostic (OneTouch Verio test strips) USE EVERY 12 HOURS AS DIRECTED 200 each 2    Dexcom G7  misc Use as instructed 1 each 0    Dexcom G7 Sensor device Apply one sensor every 10 days 9 each 3    diclofenac sodium (Voltaren) 1 % gel Apply 4.5 inches (4 g) topically 4 times a day as needed (pain). 400 g 1    dulaglutide (Trulicity) 4.5 mg/0.5 mL pen injector Inject 4.5 mg subcutaneously one time a week. (Patient not taking: Reported on 6/19/2025) 2 mL 11    empagliflozin (Jardiance) 10 mg Take 1 tablet by mouth daily with breakfast. (Patient not taking: Reported on 6/19/2025) 30 tablet 11    flash glucose sensor kit (FreeStyle Ricky 14 Day Sensor) kit USE AS DIRECTED 2 each 11    ibuprofen 600 mg tablet Take 1 tablet (600 mg) by mouth every 6 hours if needed for moderate pain (4 - 6) for up to 20 doses. 20 tablet 0    ibuprofen 600 mg tablet Take 1 tablet (600 mg) by mouth every 6 hours if needed for moderate pain (4 - 6) for up to 20 doses. 20 tablet 0    insulin lispro 100 unit/mL injection Inject 70 Units under the skin once daily. Take as directed per insulin instructions. 63 mL 3    insulin lispro 100  "unit/mL injection Inject 150 Units under the skin once daily. Take as directed per insulin instructions. On a pump 135 mL 3    insulin lispro protamin-lispro (HumaLOG Mix 50-50 KwikPen) 100 unit/mL (50-50) pen Inject under the skin 2 times daily (morning and late afternoon). Take as directed per insulin instructions.      insulin  cart,aut,G6/7,cntr (Omnipod 5 G6-G7 Intro Kt,Gen5,) cartridge Inject 1 each under the skin every other day. 1 each 0    insulin pump cart,auto,BT,G6/7 (Omnipod 5 G6-G7 Pods, Gen 5,) cartridge Inject 1 each under the skin every 3 days. 30 each 3    lidocaine (Lidoderm) 5 % patch Place 1 patch over 12 hours on the skin once daily. Remove & discard patch within 12 hours or as directed by MD. 30 patch 1    metFORMIN  mg 24 hr tablet Take 1 tablet (500 mg) by mouth 2 times daily (morning and late afternoon). 180 tablet 3    ondansetron (Zofran) 4 mg tablet Take 1 tablet (4 mg) by mouth every 6 hours if needed for nausea for up to 20 doses. (Patient not taking: Reported on 6/19/2025) 20 tablet 0    pen needle, diabetic 32 gauge x 5/32\" needle Use every day with Levemir 100 each 3    polyethylene glycol (Glycolax, Miralax) 17 gram packet Take 17 g by mouth once daily as needed (for constipation) for up to 10 doses. (Patient not taking: Reported on 6/19/2025) 10 packet 0    polyethylene glycol (Glycolax, Miralax) 17 gram/dose powder Place 1 capful (17 g) into 8 ounces of water and drink by mouth once daily as needed (for constipation) for up to 10 doses. (Patient not taking: Mix of powder and drink. Reported on 6/19/2025) 238 g 0    timolol (Timoptic) 0.5 % ophthalmic solution USE 1 DROP IN BOTH EYES TWICE DAILY. 10 mL 5     No current facility-administered medications for this visit.     "

## 2025-07-07 ENCOUNTER — APPOINTMENT (OUTPATIENT)
Dept: PHYSICAL THERAPY | Facility: CLINIC | Age: 35
End: 2025-07-07
Payer: COMMERCIAL

## 2025-07-08 ENCOUNTER — PHARMACY VISIT (OUTPATIENT)
Dept: PHARMACY | Facility: CLINIC | Age: 35
End: 2025-07-08
Payer: MEDICAID

## 2025-07-10 DIAGNOSIS — E11.65 TYPE 2 DIABETES MELLITUS WITH HYPERGLYCEMIA, WITHOUT LONG-TERM CURRENT USE OF INSULIN: ICD-10-CM

## 2025-07-10 RX ORDER — BLOOD-GLUCOSE,RECEIVER,CONT
EACH MISCELLANEOUS
Qty: 1 EACH | Refills: 0 | Status: SHIPPED | OUTPATIENT
Start: 2025-07-10

## 2025-07-10 RX ORDER — BLOOD-GLUCOSE SENSOR
EACH MISCELLANEOUS
Qty: 9 EACH | Refills: 3 | Status: SHIPPED | OUTPATIENT
Start: 2025-07-10

## 2025-07-22 PROCEDURE — RXMED WILLOW AMBULATORY MEDICATION CHARGE

## 2025-07-23 ENCOUNTER — EVALUATION (OUTPATIENT)
Dept: PHYSICAL THERAPY | Facility: CLINIC | Age: 35
End: 2025-07-23
Payer: COMMERCIAL

## 2025-07-23 ENCOUNTER — PHARMACY VISIT (OUTPATIENT)
Dept: PHARMACY | Facility: CLINIC | Age: 35
End: 2025-07-23
Payer: MEDICAID

## 2025-07-23 DIAGNOSIS — E11.65 TYPE 2 DIABETES MELLITUS WITH HYPERGLYCEMIA, WITHOUT LONG-TERM CURRENT USE OF INSULIN: ICD-10-CM

## 2025-07-23 DIAGNOSIS — M25.512 SHOULDER PAIN, BILATERAL: ICD-10-CM

## 2025-07-23 DIAGNOSIS — M75.31 CALCIFIC TENDINITIS OF RIGHT SHOULDER: ICD-10-CM

## 2025-07-23 DIAGNOSIS — M54.2 CERVICALGIA: Primary | ICD-10-CM

## 2025-07-23 DIAGNOSIS — M25.511 SHOULDER PAIN, BILATERAL: ICD-10-CM

## 2025-07-23 PROCEDURE — 97161 PT EVAL LOW COMPLEX 20 MIN: CPT | Mod: GP

## 2025-07-23 RX ORDER — INSULIN PMP CART,AUT,G6/7,CNTR
1 EACH SUBCUTANEOUS EVERY OTHER DAY
Qty: 60 EACH | Refills: 11 | Status: SHIPPED | OUTPATIENT
Start: 2025-07-23 | End: 2025-10-21

## 2025-07-23 ASSESSMENT — ENCOUNTER SYMPTOMS
LOSS OF SENSATION IN FEET: 0
PAIN SCALE: 7
DEPRESSION: 0
OCCASIONAL FEELINGS OF UNSTEADINESS: 0

## 2025-07-23 NOTE — PROGRESS NOTES
Patient ID: Sarah Gallagher is a 34 y.o. female who presents for No chief complaint on file..  HPI  ***    ROS  Comprehensive review of systems is negative.    Objective   Physical Exam  There were no vitals taken for this visit.   There were no vitals filed for this visit.   There is no height or weight on file to calculate BMI.      ***    Current Medications[1]    Assessment/Plan   ***             [1]   Current Outpatient Medications   Medication Sig Dispense Refill    acetaminophen (Tylenol) 325 mg tablet Take 2 tablets (650 mg) by mouth every 6 hours if needed for mild pain (1 - 3) for up to 20 doses. 20 tablet 0    alcohol swabs pads, medicated Apply 1 Swab topically 4 times a day. Use 4 wipes/swabs daily. 400 each 3    blood sugar diagnostic (Accu-Chek Guide test strips) Use daily as directed 50 each 11    blood sugar diagnostic (OneTouch Verio test strips) strip        blood sugar diagnostic (OneTouch Verio test strips) USE EVERY 12 HOURS AS DIRECTED 200 each 2    blood-glucose meter (Accu-Chek Guide Glucose Meter) misc Use to check blood sugar as directed 1 each 0    Dexcom G7  misc Use as instructed 1 each 0    Dexcom G7 Sensor device Apply one sensor every 10 days 9 each 3    diclofenac sodium (Voltaren) 1 % gel Apply 4.5 inches (4 g) topically 4 times a day as needed (pain). 400 g 1    dulaglutide (Trulicity) 4.5 mg/0.5 mL pen injector Inject 4.5 mg subcutaneously one time a week. (Patient not taking: Reported on 6/19/2025) 2 mL 11    empagliflozin (Jardiance) 10 mg Take 1 tablet by mouth daily with breakfast. (Patient not taking: Reported on 6/19/2025) 30 tablet 11    flash glucose sensor kit (FreeStyle Ricky 14 Day Sensor) kit USE AS DIRECTED 2 each 11    ibuprofen 600 mg tablet Take 1 tablet (600 mg) by mouth every 6 hours if needed for moderate pain (4 - 6) for up to 20 doses. 20 tablet 0    ibuprofen 600 mg tablet Take 1 tablet (600 mg) by mouth every 6 hours if needed for moderate pain (4  "- 6) for up to 20 doses. 20 tablet 0    insulin lispro 100 unit/mL injection Inject 70 Units under the skin once daily. Take as directed per insulin instructions. 63 mL 3    insulin lispro 100 unit/mL injection Inject 150 Units under the skin once daily. Take as directed per insulin instructions. On a pump 135 mL 3    insulin lispro protamin-lispro (HumaLOG Mix 50-50 KwikPen) 100 unit/mL (50-50) pen Inject under the skin 2 times daily (morning and late afternoon). Take as directed per insulin instructions.      insulin  cart,aut,G6/7,cntr (Omnipod 5 G6-G7 Intro Kt,Gen5,) cartridge Inject 1 each under the skin every other day. 1 each 0    insulin pump cart,auto,BT,G6/7 (Omnipod 5 G6-G7 Pods, Gen 5,) cartridge Inject 1 each under the skin every 3 days. 30 each 3    lancets 33 gauge misc Use as directed 200 each 2    lidocaine (Lidoderm) 5 % patch Place 1 patch over 12 hours on the skin once daily. Remove & discard patch within 12 hours or as directed by MD. 30 patch 1    metFORMIN  mg 24 hr tablet Take 1 tablet (500 mg) by mouth 2 times daily (morning and late afternoon). 180 tablet 3    ondansetron (Zofran) 4 mg tablet Take 1 tablet (4 mg) by mouth every 6 hours if needed for nausea for up to 20 doses. (Patient not taking: Reported on 6/19/2025) 20 tablet 0    pen needle, diabetic 32 gauge x 5/32\" needle Use every day with Levemir 100 each 3    polyethylene glycol (Glycolax, Miralax) 17 gram packet Take 17 g by mouth once daily as needed (for constipation) for up to 10 doses. (Patient not taking: Reported on 6/19/2025) 10 packet 0    polyethylene glycol (Glycolax, Miralax) 17 gram/dose powder Place 1 capful (17 g) into 8 ounces of water and drink by mouth once daily as needed (for constipation) for up to 10 doses. (Patient not taking: Mix of powder and drink. Reported on 6/19/2025) 238 g 0    timolol (Timoptic) 0.5 % ophthalmic solution USE 1 DROP IN BOTH EYES TWICE DAILY. 10 mL 5     No current " facility-administered medications for this visit.
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
## 2025-07-24 PROCEDURE — RXMED WILLOW AMBULATORY MEDICATION CHARGE

## 2025-08-01 ENCOUNTER — TELEPHONE (OUTPATIENT)
Dept: ENDOCRINOLOGY | Facility: CLINIC | Age: 35
End: 2025-08-01
Payer: COMMERCIAL

## 2025-08-01 ENCOUNTER — PHARMACY VISIT (OUTPATIENT)
Dept: PHARMACY | Facility: CLINIC | Age: 35
End: 2025-08-01
Payer: MEDICAID

## 2025-08-01 ENCOUNTER — OFFICE VISIT (OUTPATIENT)
Dept: ORTHOPEDIC SURGERY | Facility: CLINIC | Age: 35
End: 2025-08-01
Payer: COMMERCIAL

## 2025-08-01 VITALS — WEIGHT: 160 LBS | HEIGHT: 59 IN | BODY MASS INDEX: 32.25 KG/M2

## 2025-08-01 DIAGNOSIS — M75.51 BURSITIS OF RIGHT SHOULDER: ICD-10-CM

## 2025-08-01 DIAGNOSIS — M25.511 BILATERAL SHOULDER PAIN, UNSPECIFIED CHRONICITY: ICD-10-CM

## 2025-08-01 DIAGNOSIS — M75.51 BILATERAL SHOULDER BURSITIS: ICD-10-CM

## 2025-08-01 DIAGNOSIS — M75.31 CALCIFIC TENDINITIS OF RIGHT SHOULDER: ICD-10-CM

## 2025-08-01 DIAGNOSIS — G56.01 CARPAL TUNNEL SYNDROME OF RIGHT WRIST: ICD-10-CM

## 2025-08-01 DIAGNOSIS — M75.52 BILATERAL SHOULDER BURSITIS: ICD-10-CM

## 2025-08-01 DIAGNOSIS — M54.2 CERVICALGIA: ICD-10-CM

## 2025-08-01 DIAGNOSIS — M25.512 BILATERAL SHOULDER PAIN, UNSPECIFIED CHRONICITY: ICD-10-CM

## 2025-08-01 DIAGNOSIS — M54.12 CERVICAL RADICULOPATHY: ICD-10-CM

## 2025-08-01 PROCEDURE — 3044F HG A1C LEVEL LT 7.0%: CPT | Performed by: ORTHOPAEDIC SURGERY

## 2025-08-01 PROCEDURE — 1036F TOBACCO NON-USER: CPT | Performed by: ORTHOPAEDIC SURGERY

## 2025-08-01 PROCEDURE — 3008F BODY MASS INDEX DOCD: CPT | Performed by: ORTHOPAEDIC SURGERY

## 2025-08-01 PROCEDURE — 99212 OFFICE O/P EST SF 10 MIN: CPT | Performed by: ORTHOPAEDIC SURGERY

## 2025-08-01 ASSESSMENT — PAIN SCALES - GENERAL
PAINLEVEL_OUTOF10: 9
PAINLEVEL_OUTOF10: 9

## 2025-08-01 ASSESSMENT — LIFESTYLE VARIABLES
HOW OFTEN DURING THE LAST YEAR HAVE YOU BEEN UNABLE TO REMEMBER WHAT HAPPENED THE NIGHT BEFORE BECAUSE YOU HAD BEEN DRINKING: NEVER
AUDIT-C TOTAL SCORE: 0
HOW OFTEN DURING THE LAST YEAR HAVE YOU FOUND THAT YOU WERE NOT ABLE TO STOP DRINKING ONCE YOU HAD STARTED: NEVER
HAS A RELATIVE, FRIEND, DOCTOR, OR ANOTHER HEALTH PROFESSIONAL EXPRESSED CONCERN ABOUT YOUR DRINKING OR SUGGESTED YOU CUT DOWN: NO
HOW OFTEN DURING THE LAST YEAR HAVE YOU HAD A FEELING OF GUILT OR REMORSE AFTER DRINKING: NEVER
HOW MANY STANDARD DRINKS CONTAINING ALCOHOL DO YOU HAVE ON A TYPICAL DAY: PATIENT DOES NOT DRINK
HOW OFTEN DURING THE LAST YEAR HAVE YOU NEEDED AN ALCOHOLIC DRINK FIRST THING IN THE MORNING TO GET YOURSELF GOING AFTER A NIGHT OF HEAVY DRINKING: NEVER
HOW OFTEN DO YOU HAVE SIX OR MORE DRINKS ON ONE OCCASION: NEVER
HOW OFTEN DO YOU HAVE A DRINK CONTAINING ALCOHOL: NEVER
HOW OFTEN DURING THE LAST YEAR HAVE YOU FAILED TO DO WHAT WAS NORMALLY EXPECTED FROM YOU BECAUSE OF DRINKING: NEVER
SKIP TO QUESTIONS 9-10: 1
HAVE YOU OR SOMEONE ELSE BEEN INJURED AS A RESULT OF YOUR DRINKING: NO
AUDIT TOTAL SCORE: 0

## 2025-08-01 ASSESSMENT — PAIN - FUNCTIONAL ASSESSMENT: PAIN_FUNCTIONAL_ASSESSMENT: 0-10

## 2025-08-01 ASSESSMENT — PAIN DESCRIPTION - DESCRIPTORS: DESCRIPTORS: SORE

## 2025-08-01 ASSESSMENT — ENCOUNTER SYMPTOMS
OCCASIONAL FEELINGS OF UNSTEADINESS: 0
LOSS OF SENSATION IN FEET: 0
DEPRESSION: 0

## 2025-08-01 NOTE — TELEPHONE ENCOUNTER
Reason for call: Patient is calling to talk to a nurse she received a mychart message from Dr Hathaway about a procedure she wants to schedule it.   Notes:

## 2025-08-01 NOTE — TELEPHONE ENCOUNTER
Returned call to patient. Patient advised that  is currently out of office and message sent to  that patient would like to proceed with HSG with IR. Patient has no further questions or concerns.       08/01/25 at 10:21 AM - Shannon Tam LPN

## 2025-08-11 DIAGNOSIS — R93.89 ABNORMAL RADIOGRAPHIC EXAMINATION: Primary | ICD-10-CM

## 2025-08-13 DIAGNOSIS — Z01.812 ENCOUNTER FOR PREPROCEDURAL LABORATORY EXAMINATION: ICD-10-CM

## 2025-08-13 DIAGNOSIS — N97.9 FEMALE INFERTILITY: ICD-10-CM

## 2025-08-14 ENCOUNTER — LAB REQUISITION (OUTPATIENT)
Dept: LAB | Facility: HOSPITAL | Age: 35
End: 2025-08-14
Payer: COMMERCIAL

## 2025-08-14 DIAGNOSIS — N97.9 FEMALE INFERTILITY, UNSPECIFIED: ICD-10-CM

## 2025-08-14 DIAGNOSIS — Z01.812 ENCOUNTER FOR PREPROCEDURAL LABORATORY EXAMINATION: ICD-10-CM

## 2025-08-14 DIAGNOSIS — N92.6 IRREGULAR MENSES: Primary | ICD-10-CM

## 2025-08-14 LAB
B-HCG SERPL-ACNC: <2 MIU/ML
PROGEST SERPL-MCNC: 1.3 NG/ML

## 2025-08-14 PROCEDURE — 84702 CHORIONIC GONADOTROPIN TEST: CPT

## 2025-08-14 PROCEDURE — 84144 ASSAY OF PROGESTERONE: CPT

## 2025-08-15 ENCOUNTER — HOSPITAL ENCOUNTER (OUTPATIENT)
Dept: RADIOLOGY | Facility: HOSPITAL | Age: 35
Discharge: HOME | End: 2025-08-15
Payer: COMMERCIAL

## 2025-08-15 VITALS — HEART RATE: 93 BPM | DIASTOLIC BLOOD PRESSURE: 83 MMHG | SYSTOLIC BLOOD PRESSURE: 138 MMHG | OXYGEN SATURATION: 98 %

## 2025-08-15 DIAGNOSIS — R93.89 ABNORMAL RADIOGRAPHIC EXAMINATION: ICD-10-CM

## 2025-08-15 DIAGNOSIS — N70.11 HYDROSALPINX: Primary | ICD-10-CM

## 2025-08-15 DIAGNOSIS — N80.9 ENDOMETRIOSIS: ICD-10-CM

## 2025-08-15 PROCEDURE — 99243 OFF/OP CNSLTJ NEW/EST LOW 30: CPT | Performed by: NURSE PRACTITIONER

## 2025-08-15 ASSESSMENT — ENCOUNTER SYMPTOMS
PSYCHIATRIC NEGATIVE: 1
CARDIOVASCULAR NEGATIVE: 1
RESPIRATORY NEGATIVE: 1
ABDOMINAL DISTENTION: 1
CONSTITUTIONAL NEGATIVE: 1
NEUROLOGICAL NEGATIVE: 1
BACK PAIN: 1

## 2025-08-18 ENCOUNTER — LAB REQUISITION (OUTPATIENT)
Dept: LAB | Facility: HOSPITAL | Age: 35
End: 2025-08-18
Payer: COMMERCIAL

## 2025-08-18 DIAGNOSIS — N97.9 FEMALE INFERTILITY: ICD-10-CM

## 2025-08-18 DIAGNOSIS — N92.6 IRREGULAR MENSTRUATION, UNSPECIFIED: ICD-10-CM

## 2025-08-18 LAB — PROGEST SERPL-MCNC: 0.8 NG/ML

## 2025-08-18 PROCEDURE — 84144 ASSAY OF PROGESTERONE: CPT

## 2025-08-18 PROCEDURE — RXMED WILLOW AMBULATORY MEDICATION CHARGE

## 2025-08-18 RX ORDER — MEDROXYPROGESTERONE ACETATE 10 MG/1
10 TABLET ORAL DAILY
Qty: 10 TABLET | Refills: 0 | Status: SHIPPED | OUTPATIENT
Start: 2025-08-18 | End: 2026-08-18

## 2025-08-19 PROCEDURE — RXMED WILLOW AMBULATORY MEDICATION CHARGE

## 2025-08-21 DIAGNOSIS — N80.9 ENDOMETRIOSIS: Primary | ICD-10-CM

## 2025-08-22 ENCOUNTER — PHARMACY VISIT (OUTPATIENT)
Dept: PHARMACY | Facility: CLINIC | Age: 35
End: 2025-08-22
Payer: MEDICAID

## 2025-08-22 ENCOUNTER — TELEPHONE (OUTPATIENT)
Dept: ENDOCRINOLOGY | Facility: CLINIC | Age: 35
End: 2025-08-22
Payer: COMMERCIAL

## 2025-08-28 LAB
ERYTHROCYTE [DISTWIDTH] IN BLOOD BY AUTOMATED COUNT: 15.7 % (ref 11.5–14.5)
HCT VFR BLD AUTO: 39.4 % (ref 36–46)
HGB BLD-MCNC: 13.4 G/DL (ref 12–16)
INR PPP: 1 (ref 0.9–1.2)
MCH RBC QN AUTO: 26.8 PG (ref 26–34)
MCHC RBC AUTO-ENTMCNC: 34 G/DL (ref 32–36)
MCV RBC AUTO: 79 FL (ref 80–100)
NRBC BLD-RTO: 0 /100 WBCS (ref 0–0)
PLATELET # BLD AUTO: 319 X10*3/UL (ref 150–450)
PROTHROMBIN TIME: 10.8 SECONDS (ref 9.3–12.7)
RBC # BLD AUTO: 5 X10*6/UL (ref 4–5.2)
WBC # BLD AUTO: 6.8 X10*3/UL (ref 4.4–11.3)

## 2025-08-28 PROCEDURE — 85610 PROTHROMBIN TIME: CPT

## 2025-08-28 PROCEDURE — 85027 COMPLETE CBC AUTOMATED: CPT

## 2025-08-29 ASSESSMENT — LIFESTYLE VARIABLES: CURRENT_SMOKER: NO

## 2025-09-02 ENCOUNTER — HOSPITAL ENCOUNTER (OUTPATIENT)
Dept: RADIOLOGY | Facility: HOSPITAL | Age: 35
Discharge: HOME | End: 2025-09-02
Payer: COMMERCIAL

## 2025-09-02 VITALS
OXYGEN SATURATION: 99 % | TEMPERATURE: 98.1 F | DIASTOLIC BLOOD PRESSURE: 69 MMHG | SYSTOLIC BLOOD PRESSURE: 125 MMHG | HEART RATE: 88 BPM | RESPIRATION RATE: 20 BRPM

## 2025-09-02 DIAGNOSIS — N70.11 HYDROSALPINX: ICD-10-CM

## 2025-09-02 DIAGNOSIS — N80.9 ENDOMETRIOSIS: ICD-10-CM

## 2025-09-02 PROCEDURE — C1729 CATH, DRAINAGE: HCPCS

## 2025-09-02 PROCEDURE — 7100000010 HC PHASE TWO TIME - EACH INCREMENTAL 1 MINUTE

## 2025-09-02 PROCEDURE — 74742 X-RAY FALLOPIAN TUBE: CPT

## 2025-09-02 PROCEDURE — 2550000001 HC RX 255 CONTRASTS: Mod: JZ,SE | Performed by: RADIOLOGY

## 2025-09-02 PROCEDURE — C1769 GUIDE WIRE: HCPCS

## 2025-09-02 PROCEDURE — C1887 CATHETER, GUIDING: HCPCS

## 2025-09-02 PROCEDURE — 99153 MOD SED SAME PHYS/QHP EA: CPT

## 2025-09-02 PROCEDURE — 7100000009 HC PHASE TWO TIME - INITIAL BASE CHARGE

## 2025-09-02 PROCEDURE — 99152 MOD SED SAME PHYS/QHP 5/>YRS: CPT

## 2025-09-02 PROCEDURE — 2500000004 HC RX 250 GENERAL PHARMACY W/ HCPCS (ALT 636 FOR OP/ED): Mod: JZ,SE | Performed by: STUDENT IN AN ORGANIZED HEALTH CARE EDUCATION/TRAINING PROGRAM

## 2025-09-02 PROCEDURE — 2500000004 HC RX 250 GENERAL PHARMACY W/ HCPCS (ALT 636 FOR OP/ED): Mod: JZ,SE | Performed by: RADIOLOGY

## 2025-09-02 PROCEDURE — 2720000007 HC OR 272 NO HCPCS

## 2025-09-02 RX ORDER — FENTANYL CITRATE 50 UG/ML
INJECTION, SOLUTION INTRAMUSCULAR; INTRAVENOUS
Status: COMPLETED | OUTPATIENT
Start: 2025-09-02 | End: 2025-09-02

## 2025-09-02 RX ORDER — MIDAZOLAM HYDROCHLORIDE 2 MG/2ML
INJECTION, SOLUTION INTRAMUSCULAR; INTRAVENOUS
Status: COMPLETED | OUTPATIENT
Start: 2025-09-02 | End: 2025-09-02

## 2025-09-02 RX ORDER — ONDANSETRON 4 MG/1
4 TABLET, ORALLY DISINTEGRATING ORAL EVERY 8 HOURS PRN
Status: DISCONTINUED | OUTPATIENT
Start: 2025-09-02 | End: 2025-09-03 | Stop reason: HOSPADM

## 2025-09-02 RX ORDER — ONDANSETRON HYDROCHLORIDE 2 MG/ML
4 INJECTION, SOLUTION INTRAVENOUS EVERY 8 HOURS PRN
Status: DISCONTINUED | OUTPATIENT
Start: 2025-09-02 | End: 2025-09-03 | Stop reason: HOSPADM

## 2025-09-02 RX ORDER — ONDANSETRON HYDROCHLORIDE 2 MG/ML
INJECTION, SOLUTION INTRAVENOUS
Status: COMPLETED | OUTPATIENT
Start: 2025-09-02 | End: 2025-09-02

## 2025-09-02 RX ORDER — CEFAZOLIN SODIUM 2 G/50ML
2 SOLUTION INTRAVENOUS ONCE
Status: COMPLETED | OUTPATIENT
Start: 2025-09-02 | End: 2025-09-02

## 2025-09-02 RX ADMIN — ONDANSETRON 4 MG: 2 INJECTION INTRAMUSCULAR; INTRAVENOUS at 09:19

## 2025-09-02 RX ADMIN — MIDAZOLAM HYDROCHLORIDE 1 MG: 2 INJECTION, SOLUTION INTRAMUSCULAR; INTRAVENOUS at 09:00

## 2025-09-02 RX ADMIN — ONDANSETRON 4 MG: 2 INJECTION INTRAMUSCULAR; INTRAVENOUS at 12:20

## 2025-09-02 RX ADMIN — FENTANYL CITRATE 50 MCG: 50 INJECTION INTRAMUSCULAR; INTRAVENOUS at 09:00

## 2025-09-02 RX ADMIN — FENTANYL CITRATE 50 MCG: 50 INJECTION INTRAMUSCULAR; INTRAVENOUS at 10:03

## 2025-09-02 RX ADMIN — ONDANSETRON 4 MG: 2 INJECTION INTRAMUSCULAR; INTRAVENOUS at 10:02

## 2025-09-02 RX ADMIN — MIDAZOLAM HYDROCHLORIDE 1 MG: 2 INJECTION, SOLUTION INTRAMUSCULAR; INTRAVENOUS at 09:45

## 2025-09-02 RX ADMIN — CEFAZOLIN SODIUM 2 G: 2 SOLUTION INTRAVENOUS at 09:02

## 2025-09-02 RX ADMIN — FENTANYL CITRATE 50 MCG: 50 INJECTION INTRAMUSCULAR; INTRAVENOUS at 09:45

## 2025-09-02 RX ADMIN — IOHEXOL 100 ML: 350 INJECTION, SOLUTION INTRAVENOUS at 10:48

## 2025-09-02 RX ADMIN — MIDAZOLAM HYDROCHLORIDE 1 MG: 2 INJECTION, SOLUTION INTRAMUSCULAR; INTRAVENOUS at 10:03

## 2025-09-02 ASSESSMENT — PAIN - FUNCTIONAL ASSESSMENT

## 2025-09-02 ASSESSMENT — PAIN SCALES - GENERAL
PAINLEVEL_OUTOF10: 0 - NO PAIN
PAINLEVEL_OUTOF10: 6
PAINLEVEL_OUTOF10: 0 - NO PAIN
PAINLEVEL_OUTOF10: 3
PAINLEVEL_OUTOF10: 8
PAINLEVEL_OUTOF10: 0 - NO PAIN
PAINLEVEL_OUTOF10: 7
PAINLEVEL_OUTOF10: 0 - NO PAIN
PAINLEVEL_OUTOF10: 6
PAINLEVEL_OUTOF10: 0 - NO PAIN
PAINLEVEL_OUTOF10: 8
PAINLEVEL_OUTOF10: 0 - NO PAIN
PAINLEVEL_OUTOF10: 4
PAINLEVEL_OUTOF10: 0 - NO PAIN
PAINLEVEL_OUTOF10: 3
PAINLEVEL_OUTOF10: 0 - NO PAIN
PAINLEVEL_OUTOF10: 6
PAINLEVEL_OUTOF10: 0 - NO PAIN
PAINLEVEL_OUTOF10: 0 - NO PAIN

## 2025-09-04 ENCOUNTER — CONSULT (OUTPATIENT)
Dept: ENDOCRINOLOGY | Facility: CLINIC | Age: 35
End: 2025-09-04
Payer: COMMERCIAL

## 2025-09-04 ENCOUNTER — LAB REQUISITION (OUTPATIENT)
Dept: LAB | Facility: HOSPITAL | Age: 35
End: 2025-09-04

## 2025-09-04 ENCOUNTER — ANCILLARY PROCEDURE (OUTPATIENT)
Dept: ENDOCRINOLOGY | Facility: CLINIC | Age: 35
End: 2025-09-04
Payer: COMMERCIAL

## 2025-09-04 VITALS
HEART RATE: 84 BPM | DIASTOLIC BLOOD PRESSURE: 70 MMHG | OXYGEN SATURATION: 100 % | BODY MASS INDEX: 32.4 KG/M2 | HEIGHT: 59 IN | SYSTOLIC BLOOD PRESSURE: 118 MMHG | RESPIRATION RATE: 18 BRPM | WEIGHT: 160.7 LBS

## 2025-09-04 DIAGNOSIS — Z11.59 ENCOUNTER FOR SCREENING FOR OTHER VIRAL DISEASES: ICD-10-CM

## 2025-09-04 DIAGNOSIS — N70.11 HYDROSALPINX: ICD-10-CM

## 2025-09-04 DIAGNOSIS — N91.4 SECONDARY OLIGOMENORRHEA: ICD-10-CM

## 2025-09-04 DIAGNOSIS — Z13.71 SCREENING FOR GENETIC DISEASE CARRIER STATUS: Primary | ICD-10-CM

## 2025-09-04 DIAGNOSIS — N97.9 FEMALE INFERTILITY, UNSPECIFIED: ICD-10-CM

## 2025-09-04 DIAGNOSIS — Z13.29 SCREENING FOR THYROID DISORDER: ICD-10-CM

## 2025-09-04 DIAGNOSIS — Z01.83 ENCOUNTER FOR RH BLOOD TYPING: ICD-10-CM

## 2025-09-04 DIAGNOSIS — N80.9 ENDOMETRIOSIS: ICD-10-CM

## 2025-09-04 DIAGNOSIS — Z11.3 SCREENING FOR STDS (SEXUALLY TRANSMITTED DISEASES): ICD-10-CM

## 2025-09-04 LAB
ABO GROUP (TYPE) IN BLOOD: NORMAL
ANTIBODY SCREEN: NORMAL
PROGEST SERPL-MCNC: 1.9 NG/ML
RH FACTOR (ANTIGEN D): NORMAL

## 2025-09-04 PROCEDURE — 99215 OFFICE O/P EST HI 40 MIN: CPT | Performed by: OBSTETRICS & GYNECOLOGY

## 2025-09-04 ASSESSMENT — PAIN SCALES - GENERAL: PAINLEVEL_OUTOF10: 0-NO PAIN

## 2025-09-26 ENCOUNTER — APPOINTMENT (OUTPATIENT)
Dept: ENDOCRINOLOGY | Facility: CLINIC | Age: 35
End: 2025-09-26
Payer: COMMERCIAL

## (undated) DEVICE — SUTURE, VICRYL, 0, 27 IN, UR-6, VIOLET

## (undated) DEVICE — TRAY, SURESTEP, URINE METER, 16FR, COMPLETE, W/STATLOCK

## (undated) DEVICE — Device

## (undated) DEVICE — PAD, SANITARY, OBSTETRICAL, W/ADHSV STRIP,11 IN,LF

## (undated) DEVICE — ACCESSORY, FLUID MANAGEMENT, AVETA

## (undated) DEVICE — SOLUTION, SCRUB EXIDINE, 4% CHG, 4 OZ

## (undated) DEVICE — BRIEF, CURITY, XLARGE, MESH

## (undated) DEVICE — DEVICE, RESECTING, SMOL,  WITH HANDSET AVETA, 2.9MM

## (undated) DEVICE — SUTURE, CTD, VICRYL, 2-0, UND, BR, CT-2

## (undated) DEVICE — CARE KIT, LAPAROSCOPIC, ADVANCED

## (undated) DEVICE — TROCAR, OPTICAL, BLADELESS, KII FIOS, 5 X 100MM, THREADED

## (undated) DEVICE — ACCESSORY, AVETA, WASTE MANAGEMENT WITH CAP

## (undated) DEVICE — TROCAR, OPTICAL BLADELESS 5MM X 100 W/ADVANCED FIXATION

## (undated) DEVICE — SOLIDIFIER, KWIK-SORB, 1200CC

## (undated) DEVICE — GLOVE, SURGICAL, PROTEXIS PI ORTHO, 7.0, PF, LF

## (undated) DEVICE — SHEARS, CURVED 5MM, ENDOPATH

## (undated) DEVICE — SOLUTION, INJECTION, USP, SODIUM CHLORIDE 0.9%, .9, NACL, 1000 ML, BAG

## (undated) DEVICE — SYRINGE, 35 CC, LUER LOCK, MONOJECT, W/O CAP, LF

## (undated) DEVICE — TUBE SET, PNEUMOLAR HEATED, SMOKE EVACU, HIGH-FLOW

## (undated) DEVICE — ADHESIVE, SKIN, DERMABOND ADVANCED, 15CM, PEN-STYLE

## (undated) DEVICE — PUMP, STRYKERFLOW 2 & HANDPIECE W/10FT. IRRIGATION TUBING

## (undated) DEVICE — HYSTEROSCOPE, AVETA CORAL, 4.6MM

## (undated) DEVICE — GLOVE, SURGICAL, PROTEXIS PI BLUE W/NEUTHERA, 7.0, PF, LF

## (undated) DEVICE — SCOPE WARMER, LAPAROSCOPE, BAG ONLY, LF

## (undated) DEVICE — TOWEL, SURGICAL, NEURO, O/R, 16 X 26, BLUE, STERILE

## (undated) DEVICE — TIP, RUMI LAVENDER 5.1MM X 6CM

## (undated) DEVICE — DRAPE, C-ARM IMAGE

## (undated) DEVICE — GLOVE, SURGICAL, PROTEXIS PI MICRO, 6.5, PF, LF

## (undated) DEVICE — LIGASURE, V SEALER/DIVIDER  5MM BLUNT TIP

## (undated) DEVICE — SUTURE, VICRYL PLUS, 4-0, 27 IN, PS-2

## (undated) DEVICE — CANNULA, KII ADVANCED FIXATION, 5X100MM W/SEAL

## (undated) DEVICE — SOLUTION, IRRIGATION, SODIUM CHLORIDE 0.9%, 1000 ML, POUR BOTTLE

## (undated) DEVICE — SUTURE, MONOCRYL, 4-0, 18 IN, PS2, UNDYED